# Patient Record
Sex: FEMALE | Race: WHITE | NOT HISPANIC OR LATINO | Employment: FULL TIME | URBAN - METROPOLITAN AREA
[De-identification: names, ages, dates, MRNs, and addresses within clinical notes are randomized per-mention and may not be internally consistent; named-entity substitution may affect disease eponyms.]

---

## 2017-07-11 ENCOUNTER — HOSPITAL ENCOUNTER (EMERGENCY)
Facility: HOSPITAL | Age: 27
Discharge: HOME | End: 2017-07-11
Attending: EMERGENCY MEDICINE | Admitting: EMERGENCY MEDICINE

## 2017-07-11 VITALS
DIASTOLIC BLOOD PRESSURE: 92 MMHG | HEIGHT: 69 IN | SYSTOLIC BLOOD PRESSURE: 128 MMHG | RESPIRATION RATE: 20 BRPM | TEMPERATURE: 99 F | OXYGEN SATURATION: 98 % | WEIGHT: 180 LBS | BODY MASS INDEX: 26.66 KG/M2 | HEART RATE: 73 BPM

## 2017-07-11 DIAGNOSIS — B34.9 ACUTE VIRAL SYNDROME: Primary | ICD-10-CM

## 2017-07-11 LAB
ARTERIAL PATENCY WRIST A: POSITIVE
B-HCG UR QL: NEGATIVE
BACTERIA UR QL AUTO: ABNORMAL /HPF
BASE EXCESS BLDA CALC-SCNC: -3.9 MMOL/L (ref -2.3–2.3)
BDY SITE: ABNORMAL
BILIRUB UR QL STRIP: ABNORMAL
CLARITY UR: CLEAR
COHGB MFR BLD: 1.92 % (ref 0–1.9)
COLOR UR: YELLOW
GLUCOSE UR STRIP-MCNC: NEGATIVE MG/DL
HCO3 BLDA-SCNC: 18.4 MMOL/L (ref 22–26)
HGB BLDA-MCNC: 7.5 G/DL (ref 12–18)
HGB UR QL STRIP.AUTO: ABNORMAL
HYALINE CASTS UR QL AUTO: ABNORMAL /LPF
KETONES UR QL STRIP: ABNORMAL
LEUKOCYTE ESTERASE UR QL STRIP.AUTO: NEGATIVE
METHGB BLD QL: 0.6 % (ref 0–0.4)
MODALITY: ABNORMAL
MUCOUS THREADS URNS QL MICRO: ABNORMAL /HPF
NITRITE UR QL STRIP: NEGATIVE
OXYHGB MFR BLDV: 83.3 % (ref 79–100)
PCO2 BLDA: 23.4 MM HG (ref 35–45)
PH BLDA: 7.41 PH UNITS (ref 7.35–7.45)
PH UR STRIP.AUTO: 6 [PH] (ref 4.5–8)
PO2 BLDA: 83.6 MM HG (ref 80–100)
PROT UR QL STRIP: ABNORMAL
RBC # UR: ABNORMAL /HPF
REF LAB TEST METHOD: ABNORMAL
SAO2 % BLDCOA: 98.2 % (ref 91–100)
SP GR UR STRIP: 1.02 (ref 1–1.03)
SQUAMOUS #/AREA URNS HPF: ABNORMAL /HPF
UROBILINOGEN UR QL STRIP: ABNORMAL
WBC UR QL AUTO: ABNORMAL /HPF

## 2017-07-11 PROCEDURE — 82375 ASSAY CARBOXYHB QUANT: CPT | Performed by: EMERGENCY MEDICINE

## 2017-07-11 PROCEDURE — 87086 URINE CULTURE/COLONY COUNT: CPT | Performed by: EMERGENCY MEDICINE

## 2017-07-11 PROCEDURE — 83050 HGB METHEMOGLOBIN QUAN: CPT | Performed by: EMERGENCY MEDICINE

## 2017-07-11 PROCEDURE — 81001 URINALYSIS AUTO W/SCOPE: CPT | Performed by: EMERGENCY MEDICINE

## 2017-07-11 PROCEDURE — 83060 HGB SULFHEMOGLOBIN QUAN: CPT | Performed by: EMERGENCY MEDICINE

## 2017-07-11 PROCEDURE — 99283 EMERGENCY DEPT VISIT LOW MDM: CPT

## 2017-07-11 PROCEDURE — 82376 ASSAY CARBOXYHB QUAL: CPT | Performed by: EMERGENCY MEDICINE

## 2017-07-11 PROCEDURE — 81025 URINE PREGNANCY TEST: CPT | Performed by: EMERGENCY MEDICINE

## 2017-07-11 PROCEDURE — 36600 WITHDRAWAL OF ARTERIAL BLOOD: CPT | Performed by: EMERGENCY MEDICINE

## 2017-07-11 PROCEDURE — 82805 BLOOD GASES W/O2 SATURATION: CPT | Performed by: EMERGENCY MEDICINE

## 2017-07-11 PROCEDURE — 99284 EMERGENCY DEPT VISIT MOD MDM: CPT | Performed by: EMERGENCY MEDICINE

## 2017-07-11 RX ORDER — ONDANSETRON 4 MG/1
4 TABLET, FILM COATED ORAL EVERY 6 HOURS PRN
Qty: 30 TABLET | Refills: 0 | Status: SHIPPED | OUTPATIENT
Start: 2017-07-11 | End: 2019-03-30

## 2017-07-11 RX ORDER — TOPIRAMATE 100 MG/1
200 CAPSULE, EXTENDED RELEASE ORAL
COMMUNITY
End: 2019-03-30

## 2017-07-11 RX ORDER — ONDANSETRON 4 MG/1
4 TABLET, ORALLY DISINTEGRATING ORAL ONCE
Status: COMPLETED | OUTPATIENT
Start: 2017-07-11 | End: 2017-07-11

## 2017-07-11 RX ORDER — IBUPROFEN 800 MG/1
TABLET ORAL
Qty: 30 TABLET | Refills: 0 | Status: SHIPPED | OUTPATIENT
Start: 2017-07-11 | End: 2019-03-30

## 2017-07-11 RX ORDER — IBUPROFEN 400 MG/1
800 TABLET ORAL ONCE
Status: COMPLETED | OUTPATIENT
Start: 2017-07-11 | End: 2017-07-11

## 2017-07-11 RX ADMIN — ONDANSETRON 4 MG: 4 TABLET, ORALLY DISINTEGRATING ORAL at 21:10

## 2017-07-11 RX ADMIN — IBUPROFEN 800 MG: 400 TABLET ORAL at 21:40

## 2017-07-11 NOTE — ED TRIAGE NOTES
"Body aches, dizzy, extremities going numb, \" spitting up blood\", headache, started last night  "

## 2017-07-12 NOTE — ED PROVIDER NOTES
Subjective   History of Present Illness  History of Present Illness    Chief complaint: Flulike symptoms    Location: Diffuse    Quality/Severity:  Moderate    Timing/Duration: 2-3 days    Modifying Factors: Transiently improved with Phenergan    Associated Symptoms: Nausea without vomiting.  Headache without neck pain.  No abdominal pain, chest pain, shortness of breath or cough.  Chills without reported fever.    Narrative: 27-year-old female presents with her  with similar symptoms.  Both had flulike symptoms for 2-3 days.  No foreign travel.  No known carbon monoxide exposure.    Review of Systems  All other systems reviewed and are otherwise negative.    Past Medical History:   Diagnosis Date   • Asthma    • Headache    • Hypertension    • PCOS (polycystic ovarian syndrome) 07/2013       No Known Allergies    Past Surgical History:   Procedure Laterality Date   • ADENOIDECTOMY     • TONSILLECTOMY  02/2008    adnoids also       Family History   Problem Relation Age of Onset   • Hypertension Mother    • Hypertension Maternal Grandmother    • Diabetes Maternal Grandfather    • Hypertension Paternal Grandmother    • Diabetes Paternal Grandfather        Social History     Social History   • Marital status:      Spouse name: N/A   • Number of children: N/A   • Years of education: N/A     Social History Main Topics   • Smoking status: Current Every Day Smoker     Packs/day: 0.25   • Smokeless tobacco: Never Used   • Alcohol use Yes   • Drug use: No   • Sexual activity: Yes     Partners: Male     Other Topics Concern   • None     Social History Narrative     ED Triage Vitals   Temp Heart Rate Resp BP SpO2   07/11/17 1827 07/11/17 1827 07/11/17 1827 07/11/17 1827 07/11/17 1827   98.4 °F (36.9 °C) 95 15 122/79 98 %      Temp src Heart Rate Source Patient Position BP Location FiO2 (%)   07/11/17 1827 07/11/17 1827 07/11/17 2041 07/11/17 2041 --   Oral Monitor Sitting Right arm            Objective   Physical  Exam   Constitutional: She is oriented to person, place, and time. She appears well-developed. No distress.   Pleasant, appears as though she does not feel well though she is not overtly toxic.   HENT:   Head: Normocephalic.   Mouth/Throat: Oropharynx is clear and moist.   TMs clear bilaterally   Eyes: Conjunctivae are normal. No scleral icterus.   Neck: Neck supple.   Painless movement; no meningismus or rigidity   Cardiovascular: Normal rate and regular rhythm.    Pulmonary/Chest: Effort normal and breath sounds normal. No respiratory distress.   Abdominal: Soft. There is no tenderness.   Musculoskeletal:   MAEE, normal strength   Neurological: She is alert and oriented to person, place, and time.   Skin: Skin is warm and dry.   Psychiatric: She has a normal mood and affect. Thought content normal.   Nursing note and vitals reviewed.      Procedures         ED Course  ED Course   Value Comment By Time   Bacteria, UA: (!) 1+ Likely poor quality clean catch given the number of squamous epithelial cells.  No white cells, negative leukocyte esterase and negative nitrites.  Await carbon monoxide level Julio C Sidhu MD 07/11 2148   Carboxyhemoglobin: (!) 1.92 The reference range of COHb differs among smokers and nonsmokers, as follows:      • Nonsmokers: Up to 3%   • Smokers: Up to 10%-15% Julio C Sidhu MD 07/11 2223    Rest and oral fluids encouraged. Julio C Sidhu MD 07/11 2236      Results for orders placed or performed during the hospital encounter of 07/11/17   Pregnancy, Urine   Result Value Ref Range    HCG, Urine QL Negative Negative   Urinalysis With / Culture If Indicated   Result Value Ref Range    Color, UA Yellow Yellow, Straw    Appearance, UA Clear Clear    pH, UA 6.0 4.5 - 8.0    Specific Gravity, UA 1.020 1.003 - 1.030    Glucose, UA Negative Negative    Ketones, UA 40 mg/dL (2+) (A) Negative, 80 mg/dL (3+), >=160 mg/dL (4+)    Bilirubin, UA Small (1+) (A) Negative    Blood, UA Trace (A) Negative     Protein, UA Trace (A) Negative    Leuk Esterase, UA Negative Negative    Nitrite, UA Negative Negative    Urobilinogen, UA 1.0 E.U./dL 0.2 - 1.0 E.U./dL   Urinalysis, Microscopic Only   Result Value Ref Range    RBC, UA 3-5 (A) None Seen /HPF    WBC, UA None Seen None Seen /HPF    Bacteria, UA 1+ (A) None Seen /HPF    Squamous Epithelial Cells, UA 7-12 (A) None Seen, 0-2 /HPF    Hyaline Casts, UA None Seen None Seen /LPF    Mucus, UA Small/1+ (A) None Seen, Trace /HPF    Methodology Manual Light Microscopy    Blood Gas, Arterial With Co-Ox   Result Value Ref Range    Site Arterial: left radial     Bryan's Test Positive     pH, Arterial 7.413 7.350 - 7.450 pH units    pCO2, Arterial 23.4 (L) 35.0 - 45.0 mm Hg    pO2, Arterial 83.6 80.0 - 100.0 mm Hg    HCO3, Arterial 18.4 (L) 22.0 - 26.0 mmol/L    Base Excess, Arterial -3.9 (L) -2.3 - 2.3 mmol/L    O2 Saturation Calculated 98.2 91.0 - 100.0 %    Hemoglobin, Blood Gas 7.5 (L) 12 - 18 g/dL    Oxyhemoglobin 83.3 79 - 100 %    Methemoglobin 0.6 (H) 0 - 0.4 %    Carboxyhemoglobin 1.92 (H) 0 - 1.9 %    Modality RA                  MDM    Final diagnoses:   Acute viral syndrome              Medication List      New Prescriptions          ibuprofen 800 MG tablet   Commonly known as:  ADVIL,MOTRIN   Take one tablet by mouth every 8 hours as needed for pain       ondansetron 4 MG tablet   Commonly known as:  ZOFRAN   Take 1 tablet by mouth Every 6 (Six) Hours As Needed for Nausea or   Vomiting.         Stop          cephalexin 500 MG capsule   Commonly known as:  KEFLEX           Follow-up Information     Schedule an appointment as soon as possible for a visit with [facility].    Why:  As needed    Contact information:    [facility]  [place] [address] [phone]               Julio C Sidhu MD  07/11/17 8493

## 2017-07-12 NOTE — ED NOTES
Pt sts it started with headache then went to body aches, nausea, blood in spit (not vomit), feel like she could pass out while sitting, hands and feet goes numb.       Concepción Padron RN  07/11/17 2052

## 2017-07-13 LAB — BACTERIA SPEC AEROBE CULT: NORMAL

## 2017-07-17 LAB
COHGB MFR BLD: NORMAL %SATURATION
METHGB MFR BLD: NORMAL %SATURATION
SULFHEMOGLOBIN, B: NORMAL %SATURATION

## 2017-12-06 ENCOUNTER — HOSPITAL ENCOUNTER (EMERGENCY)
Facility: HOSPITAL | Age: 27
Discharge: HOME | End: 2017-12-06
Attending: EMERGENCY MEDICINE | Admitting: EMERGENCY MEDICINE

## 2017-12-06 ENCOUNTER — APPOINTMENT (OUTPATIENT)
Dept: GENERAL RADIOLOGY | Facility: HOSPITAL | Age: 27
End: 2017-12-06

## 2017-12-06 VITALS
HEART RATE: 87 BPM | WEIGHT: 175 LBS | RESPIRATION RATE: 20 BRPM | TEMPERATURE: 99.7 F | SYSTOLIC BLOOD PRESSURE: 134 MMHG | HEIGHT: 68 IN | OXYGEN SATURATION: 98 % | DIASTOLIC BLOOD PRESSURE: 89 MMHG | BODY MASS INDEX: 26.52 KG/M2

## 2017-12-06 DIAGNOSIS — J18.9 PNEUMONIA DUE TO INFECTIOUS ORGANISM, UNSPECIFIED LATERALITY, UNSPECIFIED PART OF LUNG: ICD-10-CM

## 2017-12-06 DIAGNOSIS — J20.9 ACUTE BRONCHITIS WITH BRONCHOSPASM: Primary | ICD-10-CM

## 2017-12-06 LAB — B-HCG UR QL: NEGATIVE

## 2017-12-06 PROCEDURE — 99284 EMERGENCY DEPT VISIT MOD MDM: CPT

## 2017-12-06 PROCEDURE — 71020 HC CHEST PA AND LATERAL: CPT

## 2017-12-06 PROCEDURE — 94640 AIRWAY INHALATION TREATMENT: CPT

## 2017-12-06 PROCEDURE — 81025 URINE PREGNANCY TEST: CPT | Performed by: PHYSICIAN ASSISTANT

## 2017-12-06 PROCEDURE — 99284 EMERGENCY DEPT VISIT MOD MDM: CPT | Performed by: EMERGENCY MEDICINE

## 2017-12-06 RX ORDER — AZITHROMYCIN 250 MG/1
500 TABLET, FILM COATED ORAL ONCE
Status: COMPLETED | OUTPATIENT
Start: 2017-12-06 | End: 2017-12-06

## 2017-12-06 RX ORDER — ALBUTEROL SULFATE 2.5 MG/3ML
2.5 SOLUTION RESPIRATORY (INHALATION) ONCE
Status: COMPLETED | OUTPATIENT
Start: 2017-12-06 | End: 2017-12-06

## 2017-12-06 RX ORDER — AZITHROMYCIN 250 MG/1
TABLET, FILM COATED ORAL
Qty: 4 TABLET | Refills: 0 | Status: SHIPPED | OUTPATIENT
Start: 2017-12-06 | End: 2019-03-30

## 2017-12-06 RX ADMIN — AZITHROMYCIN 500 MG: 250 TABLET, FILM COATED ORAL at 23:08

## 2017-12-06 RX ADMIN — ALBUTEROL SULFATE 2.5 MG: 2.5 SOLUTION RESPIRATORY (INHALATION) at 21:49

## 2017-12-06 RX ADMIN — HYDROCODONE BITARTRATE AND HOMATROPINE METHYLBROMIDE 5 ML: 5; 1.5 SOLUTION ORAL at 21:43

## 2017-12-07 ENCOUNTER — HOSPITAL ENCOUNTER (OUTPATIENT)
Dept: GENERAL RADIOLOGY | Facility: HOSPITAL | Age: 27
Discharge: HOME | End: 2017-12-06

## 2017-12-07 DIAGNOSIS — R05.9 COUGH: ICD-10-CM

## 2017-12-07 NOTE — ED NOTES
Records requested from [facility]     Heladio Wiley  12/06/17 8543

## 2017-12-07 NOTE — DISCHARGE INSTRUCTIONS
Continue your steroids that were recently prescribed to use until you have completed them.  Use the Hycodan as needed for acute bronchospasm.  Follow-up as an outpatient with her primary care provider.  Excellent work thus far and good luck in the future to larger goal of smoking cessation.    Return to the emergency department with worsening symptoms, uncontrolled pain, inability to tolerate oral liquids, fever greater than 105°F not controlled by Tylenol and ibuprofen or as needed with emergent concerns.

## 2017-12-07 NOTE — ED PROVIDER NOTES
Subjective   History of Present Illness     History of Present Illness    Chief complaint: Cough and sensation of inability to catch her breath due to cough    Location: Mild peristernal discomfort    Quality/Severity:  Moderate    Timing/Duration: URI symptoms with cough ×4 days, persistent coughing for 30 minutes    Modifying Factors: Transient relief with albuterol inhaler    Associated Symptoms: No fever or hemoptysis    Narrative: 27-year-old female with history of asthma who stopped smoking 4 days ago but is also had URI symptoms for that period of time and was seen previously at another emergency department and started on steroids presents to this emergency department tonight with an episode of coughing paroxysms that she is having difficulty controlling.    Review of Systems  All other systems reviewed and are otherwise negative.    Past Medical History:   Diagnosis Date   • Asthma    • Headache    • Hypertension    • PCOS (polycystic ovarian syndrome) 07/2013       No Known Allergies    Past Surgical History:   Procedure Laterality Date   • ADENOIDECTOMY     • TONSILLECTOMY  02/2008    adnoids also       Family History   Problem Relation Age of Onset   • Hypertension Mother    • Hypertension Maternal Grandmother    • Diabetes Maternal Grandfather    • Hypertension Paternal Grandmother    • Diabetes Paternal Grandfather        Social History     Social History   • Marital status:      Spouse name: N/A   • Number of children: N/A   • Years of education: N/A     Social History Main Topics   • Smoking status: Current Every Day Smoker     Packs/day: 0.25   • Smokeless tobacco: Never Used   • Alcohol use Yes   • Drug use: No   • Sexual activity: Yes     Partners: Male     Other Topics Concern   • None     Social History Narrative     ED Triage Vitals   Temp Heart Rate Resp BP SpO2   12/06/17 2121 12/06/17 2121 12/06/17 2121 12/06/17 2121 12/06/17 2121   98 °F (36.7 °C) 99 20 166/94 99 %      Temp src Heart  Rate Source Patient Position BP Location FiO2 (%)   12/06/17 2121 12/06/17 2121 12/06/17 2121 12/06/17 2121 --   Oral Monitor Sitting Right arm            Objective   Physical Exam   Constitutional: She is oriented to person, place, and time. She appears well-developed. No distress.   Frequent dry hacking cough   HENT:   Head: Normocephalic.   Mouth/Throat: Oropharynx is clear and moist.   Eyes: Conjunctivae are normal. No scleral icterus.   Neck: Neck supple.   Painless movement   Cardiovascular: Normal rate and regular rhythm.    Pulmonary/Chest: Effort normal and breath sounds normal. No respiratory distress.   Abdominal: Soft. There is no tenderness.   Musculoskeletal: She exhibits no edema or tenderness.   MAEE, normal strength   Neurological: She is alert and oriented to person, place, and time.   Skin: Skin is warm and dry.   Psychiatric: She has a normal mood and affect. Thought content normal.   Nursing note and vitals reviewed.      Procedures         ED Course  ED Course   Comment By Time   Albuterol treatment and Hycodan for bronchospasm. Julio C Sidhu MD 12/06 2145   Continue to await for UPT before Julio C Sidhu MD 12/06 2235      RADIOLOGY        Study: Chest x-ray-2 views    Findings: Hazy area on lateral view concerning for early infiltrate versus mass    Interpreted contemporaneously with treatment by me, independently viewed by me    Results for orders placed or performed during the hospital encounter of 12/06/17   Pregnancy, Urine - Urine, Clean Catch   Result Value Ref Range    HCG, Urine QL Negative Negative             MDM  Number of Diagnoses or Management Options  Acute bronchitis with bronchospasm: new and requires workup  Pneumonia due to infectious organism, unspecified laterality, unspecified part of lung:      Amount and/or Complexity of Data Reviewed  Clinical lab tests: reviewed and ordered  Tests in the radiology section of CPT®: ordered and reviewed  Independent visualization of  images, tracings, or specimens: yes    Gennaro query complete. Treatment plan to include limited course of prescribed controlled substance.  Risks including addiction, tolerance, sedation, benefits and alternatives presented to patient.    Final diagnoses:   Acute bronchitis with bronchospasm   Pneumonia due to infectious organism, unspecified laterality, unspecified part of lung              Medication List      New Prescriptions          azithromycin 250 MG tablet   Commonly known as:  ZITHROMAX   Take 1 tablet by mouth daily for 4 days.       HYDROcodone-homatropine 5-1.5 MG/5ML syrup   Commonly known as:  HYCODAN   Take 510 mL by mouth every 6 hours when necessary cough         Stop          cephalexin 500 MG capsule   Commonly known as:  KEFLEX           Follow-up Information     Follow up with Your primary care provider. Schedule an appointment as soon as possible for a visit in 1 week.    Why:  Reassessment               Julio C Sidhu MD  12/06/17 3279

## 2018-07-29 ENCOUNTER — APPOINTMENT (OUTPATIENT)
Dept: GENERAL RADIOLOGY | Facility: HOSPITAL | Age: 28
End: 2018-07-29

## 2018-07-29 ENCOUNTER — HOSPITAL ENCOUNTER (EMERGENCY)
Facility: HOSPITAL | Age: 28
Discharge: HOME | End: 2018-07-29
Attending: EMERGENCY MEDICINE | Admitting: EMERGENCY MEDICINE

## 2018-07-29 VITALS
WEIGHT: 182 LBS | SYSTOLIC BLOOD PRESSURE: 179 MMHG | DIASTOLIC BLOOD PRESSURE: 116 MMHG | OXYGEN SATURATION: 99 % | HEART RATE: 69 BPM | RESPIRATION RATE: 18 BRPM | TEMPERATURE: 98.5 F | HEIGHT: 69 IN | BODY MASS INDEX: 26.96 KG/M2
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
## 2018-07-29 DIAGNOSIS — S90.211A SUBUNGUAL HEMATOMA OF GREAT TOE OF RIGHT FOOT, INITIAL ENCOUNTER: ICD-10-CM

## 2018-07-29 DIAGNOSIS — R03.0 ELEVATED BLOOD PRESSURE READING: ICD-10-CM

## 2018-07-29 DIAGNOSIS — S92.254A CLOSED NONDISPLACED FRACTURE OF NAVICULAR BONE OF RIGHT FOOT, INITIAL ENCOUNTER: Primary | ICD-10-CM

## 2018-07-29 DIAGNOSIS — T07.XXXA MULTIPLE ABRASIONS: ICD-10-CM

## 2018-07-29 PROCEDURE — 73630 X-RAY EXAM OF FOOT: CPT

## 2018-07-29 PROCEDURE — 99283 EMERGENCY DEPT VISIT LOW MDM: CPT

## 2018-07-29 PROCEDURE — 64450 NJX AA&/STRD OTHER PN/BRANCH: CPT | Performed by: EMERGENCY MEDICINE

## 2018-07-29 PROCEDURE — 99284 EMERGENCY DEPT VISIT MOD MDM: CPT | Performed by: EMERGENCY MEDICINE

## 2018-07-29 RX ORDER — BUPIVACAINE HYDROCHLORIDE 5 MG/ML
INJECTION, SOLUTION EPIDURAL; INTRACAUDAL
Status: COMPLETED
Start: 2018-07-29 | End: 2018-07-29

## 2018-07-29 RX ORDER — LIDOCAINE HYDROCHLORIDE 20 MG/ML
INJECTION, SOLUTION INFILTRATION; PERINEURAL
Status: COMPLETED
Start: 2018-07-29 | End: 2018-07-29

## 2018-07-29 RX ORDER — CEPHALEXIN 500 MG/1
500 CAPSULE ORAL 3 TIMES DAILY
Qty: 15 CAPSULE | Refills: 0 | Status: SHIPPED | OUTPATIENT
Start: 2018-07-29 | End: 2019-03-30

## 2018-07-29 RX ORDER — HYDROCODONE BITARTRATE AND ACETAMINOPHEN 5; 325 MG/1; MG/1
TABLET ORAL
Qty: 20 TABLET | Refills: 0 | Status: SHIPPED | OUTPATIENT
Start: 2018-07-29 | End: 2019-03-30

## 2018-07-29 RX ORDER — CEPHALEXIN 500 MG/1
500 CAPSULE ORAL ONCE
Status: COMPLETED | OUTPATIENT
Start: 2018-07-29 | End: 2018-07-29

## 2018-07-29 RX ADMIN — LIDOCAINE HYDROCHLORIDE: 20 INJECTION, SOLUTION INFILTRATION; PERINEURAL at 22:31

## 2018-07-29 RX ADMIN — BUPIVACAINE HYDROCHLORIDE: 5 INJECTION, SOLUTION EPIDURAL; INTRACAUDAL; PERINEURAL at 22:32

## 2018-07-29 RX ADMIN — CEPHALEXIN 500 MG: 500 CAPSULE ORAL at 22:35

## 2019-03-30 ENCOUNTER — APPOINTMENT (OUTPATIENT)
Dept: ULTRASOUND IMAGING | Facility: HOSPITAL | Age: 29
End: 2019-03-30

## 2019-03-30 ENCOUNTER — HOSPITAL ENCOUNTER (EMERGENCY)
Facility: HOSPITAL | Age: 29
Discharge: HOME | End: 2019-03-30
Attending: EMERGENCY MEDICINE | Admitting: EMERGENCY MEDICINE

## 2019-03-30 VITALS
BODY MASS INDEX: 25.77 KG/M2 | WEIGHT: 174 LBS | TEMPERATURE: 98.2 F | SYSTOLIC BLOOD PRESSURE: 132 MMHG | RESPIRATION RATE: 18 BRPM | HEART RATE: 65 BPM | DIASTOLIC BLOOD PRESSURE: 90 MMHG | OXYGEN SATURATION: 97 % | HEIGHT: 69 IN

## 2019-03-30 DIAGNOSIS — N92.1 MENOMETRORRHAGIA: Primary | ICD-10-CM

## 2019-03-30 LAB
B-HCG UR QL: NEGATIVE
CLUE CELLS SPEC QL WET PREP: NORMAL
HYDATID CYST SPEC WET PREP: NORMAL
T VAGINALIS SPEC QL WET PREP: NORMAL
WBC SPEC QL WET PREP: NORMAL
YEAST GENITAL QL WET PREP: NORMAL

## 2019-03-30 PROCEDURE — 99284 EMERGENCY DEPT VISIT MOD MDM: CPT | Performed by: PHYSICIAN ASSISTANT

## 2019-03-30 PROCEDURE — 87491 CHLMYD TRACH DNA AMP PROBE: CPT | Performed by: PHYSICIAN ASSISTANT

## 2019-03-30 PROCEDURE — 76830 TRANSVAGINAL US NON-OB: CPT

## 2019-03-30 PROCEDURE — 87210 SMEAR WET MOUNT SALINE/INK: CPT | Performed by: PHYSICIAN ASSISTANT

## 2019-03-30 PROCEDURE — 87081 CULTURE SCREEN ONLY: CPT | Performed by: PHYSICIAN ASSISTANT

## 2019-03-30 PROCEDURE — 76856 US EXAM PELVIC COMPLETE: CPT

## 2019-03-30 PROCEDURE — 99284 EMERGENCY DEPT VISIT MOD MDM: CPT

## 2019-03-30 PROCEDURE — 81025 URINE PREGNANCY TEST: CPT | Performed by: PHYSICIAN ASSISTANT

## 2019-03-30 PROCEDURE — 87591 N.GONORRHOEAE DNA AMP PROB: CPT | Performed by: PHYSICIAN ASSISTANT

## 2019-03-30 RX ORDER — IBUPROFEN 800 MG/1
800 TABLET ORAL EVERY 8 HOURS PRN
Qty: 20 TABLET | Refills: 0 | Status: SHIPPED | OUTPATIENT
Start: 2019-03-30 | End: 2019-09-16

## 2019-03-30 RX ORDER — IBUPROFEN 400 MG/1
800 TABLET ORAL ONCE
Status: COMPLETED | OUTPATIENT
Start: 2019-03-30 | End: 2019-03-30

## 2019-03-30 RX ORDER — METOPROLOL TARTRATE 50 MG/1
50 TABLET, FILM COATED ORAL 2 TIMES DAILY
COMMUNITY
End: 2019-09-16

## 2019-03-30 RX ADMIN — IBUPROFEN 800 MG: 400 TABLET ORAL at 15:46

## 2019-04-02 LAB
C TRACH RRNA SPEC DONR QL NAA+PROBE: NEGATIVE
N GONORRHOEA DNA SPEC QL NAA+PROBE: NEGATIVE

## 2019-04-03 LAB — BACTERIA SPEC AEROBE CULT: NORMAL

## 2019-05-17 ENCOUNTER — HOSPITAL ENCOUNTER (EMERGENCY)
Facility: HOSPITAL | Age: 29
Discharge: HOME | End: 2019-05-18
Attending: EMERGENCY MEDICINE | Admitting: EMERGENCY MEDICINE

## 2019-05-17 DIAGNOSIS — R53.83 OTHER FATIGUE: ICD-10-CM

## 2019-05-17 DIAGNOSIS — R55 SYNCOPE AND COLLAPSE: Primary | ICD-10-CM

## 2019-05-17 PROCEDURE — 99284 EMERGENCY DEPT VISIT MOD MDM: CPT | Performed by: EMERGENCY MEDICINE

## 2019-05-17 PROCEDURE — 99284 EMERGENCY DEPT VISIT MOD MDM: CPT

## 2019-05-17 PROCEDURE — 93005 ELECTROCARDIOGRAM TRACING: CPT | Performed by: EMERGENCY MEDICINE

## 2019-05-17 RX ORDER — SODIUM CHLORIDE 0.9 % (FLUSH) 0.9 %
10 SYRINGE (ML) INJECTION AS NEEDED
Status: DISCONTINUED | OUTPATIENT
Start: 2019-05-17 | End: 2019-05-18 | Stop reason: HOSPADM

## 2019-05-18 VITALS
HEIGHT: 69 IN | TEMPERATURE: 98.1 F | HEART RATE: 87 BPM | SYSTOLIC BLOOD PRESSURE: 141 MMHG | BODY MASS INDEX: 27.4 KG/M2 | WEIGHT: 185 LBS | OXYGEN SATURATION: 95 % | RESPIRATION RATE: 16 BRPM | DIASTOLIC BLOOD PRESSURE: 93 MMHG

## 2019-05-18 LAB
ALBUMIN SERPL-MCNC: 4.3 G/DL (ref 3.5–5.2)
ALBUMIN/GLOB SERPL: 1.7 G/DL
ALP SERPL-CCNC: 77 U/L (ref 39–117)
ALT SERPL W P-5'-P-CCNC: 17 U/L (ref 1–33)
AMPHET+METHAMPHET UR QL: NEGATIVE
AMPHETAMINES UR QL: NEGATIVE
ANION GAP SERPL CALCULATED.3IONS-SCNC: 12.2 MMOL/L
APTT PPP: 30.2 SECONDS (ref 24.3–38.1)
AST SERPL-CCNC: 16 U/L (ref 1–32)
BACTERIA UR QL AUTO: ABNORMAL /HPF
BARBITURATES UR QL SCN: NEGATIVE
BASOPHILS # BLD AUTO: 0.06 10*3/MM3 (ref 0–0.2)
BASOPHILS NFR BLD AUTO: 0.6 % (ref 0–1.5)
BENZODIAZ UR QL SCN: NEGATIVE
BILIRUB SERPL-MCNC: <0.2 MG/DL (ref 0.2–1.2)
BILIRUB UR QL STRIP: NEGATIVE
BUN BLD-MCNC: 15 MG/DL (ref 6–20)
BUN/CREAT SERPL: 16.1 (ref 7–25)
BUPRENORPHINE SERPL-MCNC: NEGATIVE NG/ML
CALCIUM SPEC-SCNC: 9.8 MG/DL (ref 8.6–10.5)
CANNABINOIDS SERPL QL: NEGATIVE
CHLORIDE SERPL-SCNC: 103 MMOL/L (ref 98–107)
CLARITY UR: CLEAR
CO2 SERPL-SCNC: 22.8 MMOL/L (ref 22–29)
COCAINE UR QL: NEGATIVE
COLOR UR: YELLOW
CREAT BLD-MCNC: 0.93 MG/DL (ref 0.57–1)
DEPRECATED RDW RBC AUTO: 46.5 FL (ref 37–54)
EOSINOPHIL # BLD AUTO: 0.15 10*3/MM3 (ref 0–0.4)
EOSINOPHIL NFR BLD AUTO: 1.5 % (ref 0.3–6.2)
ERYTHROCYTE [DISTWIDTH] IN BLOOD BY AUTOMATED COUNT: 14.4 % (ref 12.3–15.4)
GFR SERPL CREATININE-BSD FRML MDRD: 71 ML/MIN/1.73
GLOBULIN UR ELPH-MCNC: 2.6 GM/DL
GLUCOSE BLD-MCNC: 112 MG/DL (ref 65–99)
GLUCOSE BLDC GLUCOMTR-MCNC: 109 MG/DL (ref 70–130)
GLUCOSE UR STRIP-MCNC: NEGATIVE MG/DL
HCG SERPL QL: NEGATIVE
HCT VFR BLD AUTO: 40.9 % (ref 34–46.6)
HGB BLD-MCNC: 13.4 G/DL (ref 12–15.9)
HGB UR QL STRIP.AUTO: ABNORMAL
HYALINE CASTS UR QL AUTO: ABNORMAL /LPF
IMM GRANULOCYTES # BLD AUTO: 0.04 10*3/MM3 (ref 0–0.05)
IMM GRANULOCYTES NFR BLD AUTO: 0.4 % (ref 0–0.5)
INR PPP: 1.06 (ref 0.9–1.1)
KETONES UR QL STRIP: NEGATIVE
LEUKOCYTE ESTERASE UR QL STRIP.AUTO: NEGATIVE
LYMPHOCYTES # BLD AUTO: 3.89 10*3/MM3 (ref 0.7–3.1)
LYMPHOCYTES NFR BLD AUTO: 38 % (ref 19.6–45.3)
MCH RBC QN AUTO: 29.1 PG (ref 26.6–33)
MCHC RBC AUTO-ENTMCNC: 32.8 G/DL (ref 31.5–35.7)
MCV RBC AUTO: 88.7 FL (ref 79–97)
METHADONE UR QL SCN: NEGATIVE
MONOCYTES # BLD AUTO: 0.89 10*3/MM3 (ref 0.1–0.9)
MONOCYTES NFR BLD AUTO: 8.7 % (ref 5–12)
NEUTROPHILS # BLD AUTO: 5.21 10*3/MM3 (ref 1.7–7)
NEUTROPHILS NFR BLD AUTO: 50.8 % (ref 42.7–76)
NITRITE UR QL STRIP: NEGATIVE
NRBC BLD AUTO-RTO: 0 /100 WBC (ref 0–0.2)
OPIATES UR QL: NEGATIVE
OXYCODONE UR QL SCN: NEGATIVE
PCP UR QL SCN: NEGATIVE
PH UR STRIP.AUTO: 5.5 [PH] (ref 4.5–8)
PLATELET # BLD AUTO: 348 10*3/MM3 (ref 140–450)
PMV BLD AUTO: 10.4 FL (ref 6–12)
POTASSIUM BLD-SCNC: 3.6 MMOL/L (ref 3.5–5.2)
PROPOXYPH UR QL: NEGATIVE
PROT SERPL-MCNC: 6.9 G/DL (ref 6–8.5)
PROT UR QL STRIP: NEGATIVE
PROTHROMBIN TIME: 13.5 SECONDS (ref 12.1–15)
RBC # BLD AUTO: 4.61 10*6/MM3 (ref 3.77–5.28)
RBC # UR: ABNORMAL /HPF
REF LAB TEST METHOD: ABNORMAL
SODIUM BLD-SCNC: 138 MMOL/L (ref 136–145)
SP GR UR STRIP: 1.02 (ref 1–1.03)
SQUAMOUS #/AREA URNS HPF: ABNORMAL /HPF
TRICYCLICS UR QL SCN: NEGATIVE
TROPONIN T SERPL-MCNC: <0.01 NG/ML (ref 0–0.03)
UROBILINOGEN UR QL STRIP: ABNORMAL
WBC NRBC COR # BLD: 10.24 10*3/MM3 (ref 3.4–10.8)
WBC UR QL AUTO: ABNORMAL /HPF

## 2019-05-18 PROCEDURE — 85730 THROMBOPLASTIN TIME PARTIAL: CPT | Performed by: EMERGENCY MEDICINE

## 2019-05-18 PROCEDURE — 81001 URINALYSIS AUTO W/SCOPE: CPT | Performed by: EMERGENCY MEDICINE

## 2019-05-18 PROCEDURE — 80306 DRUG TEST PRSMV INSTRMNT: CPT | Performed by: EMERGENCY MEDICINE

## 2019-05-18 PROCEDURE — 85610 PROTHROMBIN TIME: CPT | Performed by: EMERGENCY MEDICINE

## 2019-05-18 PROCEDURE — 80053 COMPREHEN METABOLIC PANEL: CPT | Performed by: EMERGENCY MEDICINE

## 2019-05-18 PROCEDURE — 85025 COMPLETE CBC W/AUTO DIFF WBC: CPT | Performed by: EMERGENCY MEDICINE

## 2019-05-18 PROCEDURE — 93010 ELECTROCARDIOGRAM REPORT: CPT | Performed by: INTERNAL MEDICINE

## 2019-05-18 PROCEDURE — 84484 ASSAY OF TROPONIN QUANT: CPT | Performed by: EMERGENCY MEDICINE

## 2019-05-18 PROCEDURE — 82962 GLUCOSE BLOOD TEST: CPT

## 2019-05-18 PROCEDURE — 84703 CHORIONIC GONADOTROPIN ASSAY: CPT | Performed by: EMERGENCY MEDICINE

## 2019-05-18 RX ADMIN — SODIUM CHLORIDE 500 ML: 9 INJECTION, SOLUTION INTRAVENOUS at 00:47

## 2019-09-16 ENCOUNTER — HOSPITAL ENCOUNTER (EMERGENCY)
Facility: HOSPITAL | Age: 29
Discharge: HOME | End: 2019-09-16
Attending: EMERGENCY MEDICINE | Admitting: EMERGENCY MEDICINE

## 2019-09-16 VITALS
TEMPERATURE: 100 F | HEART RATE: 100 BPM | WEIGHT: 185 LBS | HEIGHT: 69 IN | BODY MASS INDEX: 27.4 KG/M2 | DIASTOLIC BLOOD PRESSURE: 100 MMHG | RESPIRATION RATE: 18 BRPM | SYSTOLIC BLOOD PRESSURE: 150 MMHG | OXYGEN SATURATION: 99 %

## 2019-09-16 DIAGNOSIS — L03.90 CELLULITIS, UNSPECIFIED CELLULITIS SITE: Primary | ICD-10-CM

## 2019-09-16 PROCEDURE — 99282 EMERGENCY DEPT VISIT SF MDM: CPT | Performed by: EMERGENCY MEDICINE

## 2019-09-16 PROCEDURE — 99282 EMERGENCY DEPT VISIT SF MDM: CPT

## 2019-09-16 RX ORDER — CLINDAMYCIN HYDROCHLORIDE 150 MG/1
450 CAPSULE ORAL 3 TIMES DAILY
Qty: 63 CAPSULE | Refills: 0 | Status: SHIPPED | OUTPATIENT
Start: 2019-09-16 | End: 2019-09-23

## 2019-09-16 NOTE — ED PROVIDER NOTES
Subjective   29-year-old female who denies past medical history presents with pain, swelling, irritation primarily to left ankle.  Patient states she developed a blister on her posterior heel 4 days ago from shoes rubbing.  She attempted local wound care at home but over the past day or 2 she has developed worsening redness to the area.  Patient has minimal pain to remainder of leg but states she believes it is secondary to swelling.  Pain is primarily to posterior and lateral ankle.  No fevers or chills.  No nausea or vomiting.            Review of Systems   Constitutional: Negative.    Respiratory: Negative.    Cardiovascular: Negative.    Gastrointestinal: Negative.    Musculoskeletal:        Per HPI, otherwise negative   Skin:        Per HPI, otherwise negative   Neurological: Negative.        Past Medical History:   Diagnosis Date   • Asthma    • Headache    • Hypertension    • PCOS (polycystic ovarian syndrome) 07/2013       No Known Allergies    Past Surgical History:   Procedure Laterality Date   • ADENOIDECTOMY     • TONSILLECTOMY  02/2008    adnoids also       Family History   Problem Relation Age of Onset   • Hypertension Mother    • Hypertension Maternal Grandmother    • Diabetes Maternal Grandfather    • Hypertension Paternal Grandmother    • Diabetes Paternal Grandfather        Social History     Socioeconomic History   • Marital status:      Spouse name: Not on file   • Number of children: Not on file   • Years of education: Not on file   • Highest education level: Not on file   Tobacco Use   • Smoking status: Current Every Day Smoker     Packs/day: 0.25   • Smokeless tobacco: Never Used   Substance and Sexual Activity   • Alcohol use: Yes   • Drug use: No   • Sexual activity: Yes     Partners: Male           Objective   Physical Exam   Constitutional: She is oriented to person, place, and time. She appears well-developed and well-nourished. No distress.   HENT:   Head: Normocephalic.    Cardiovascular: Normal rate and regular rhythm.   Pulmonary/Chest: Effort normal and breath sounds normal.   Abdominal: Soft. She exhibits no distension. There is no tenderness.   Musculoskeletal: Normal range of motion. She exhibits no tenderness or deformity.   Neurological: She is alert and oriented to person, place, and time.   Skin: She is not diaphoretic.   Small deroofed blister over posterior foot and ankle overlying Achilles tendon.  Minimal surrounding erythema but also tracks to the lateral aspect of ankle.  Trace lower extremity edema to mid calf.  No calf tenderness.  Negative Homans sign.       Procedures           ED Course                  MDM  Number of Diagnoses or Management Options  Diagnosis management comments: Appearance consistent with cellulitis secondary to blister formation.  Will treat for simple cellulitis.  Patient does have some swelling to ankle, but no other findings suggestive of DVT.  Did discuss DVT risk factors with patient.  Discussed expected course and return precautions.      Final diagnoses:   Cellulitis, unspecified cellulitis site              Kentrell Dia MD  09/16/19 0693

## 2019-11-15 ENCOUNTER — APPOINTMENT (OUTPATIENT)
Dept: GENERAL RADIOLOGY | Facility: HOSPITAL | Age: 29
End: 2019-11-15

## 2019-11-15 ENCOUNTER — HOSPITAL ENCOUNTER (EMERGENCY)
Facility: HOSPITAL | Age: 29
Discharge: HOME | End: 2019-11-15
Attending: EMERGENCY MEDICINE | Admitting: EMERGENCY MEDICINE

## 2019-11-15 VITALS
HEART RATE: 88 BPM | TEMPERATURE: 98.9 F | OXYGEN SATURATION: 97 % | SYSTOLIC BLOOD PRESSURE: 134 MMHG | RESPIRATION RATE: 24 BRPM | WEIGHT: 183 LBS | DIASTOLIC BLOOD PRESSURE: 94 MMHG | BODY MASS INDEX: 27.11 KG/M2 | HEIGHT: 69 IN

## 2019-11-15 DIAGNOSIS — R55 NEAR SYNCOPE: Primary | ICD-10-CM

## 2019-11-15 LAB
ALBUMIN SERPL-MCNC: 4.3 G/DL (ref 3.5–5.2)
ALBUMIN/GLOB SERPL: 1.5 G/DL
ALP SERPL-CCNC: 80 U/L (ref 39–117)
ALT SERPL W P-5'-P-CCNC: 16 U/L (ref 1–33)
AMORPH URATE CRY URNS QL MICRO: ABNORMAL /HPF
ANION GAP SERPL CALCULATED.3IONS-SCNC: 14.3 MMOL/L (ref 5–15)
AST SERPL-CCNC: 17 U/L (ref 1–32)
B-HCG UR QL: NEGATIVE
BACTERIA UR QL AUTO: ABNORMAL /HPF
BASOPHILS # BLD AUTO: 0.1 10*3/MM3 (ref 0–0.2)
BASOPHILS NFR BLD AUTO: 0.8 % (ref 0–1.5)
BILIRUB SERPL-MCNC: <0.2 MG/DL (ref 0.2–1.2)
BILIRUB UR QL STRIP: NEGATIVE
BUN BLD-MCNC: 9 MG/DL (ref 6–20)
BUN/CREAT SERPL: 12.3 (ref 7–25)
CALCIUM SPEC-SCNC: 8.9 MG/DL (ref 8.6–10.5)
CHLORIDE SERPL-SCNC: 101 MMOL/L (ref 98–107)
CLARITY UR: CLEAR
CO2 SERPL-SCNC: 23.7 MMOL/L (ref 22–29)
COLOR UR: YELLOW
CREAT BLD-MCNC: 0.73 MG/DL (ref 0.57–1)
D DIMER PPP FEU-MCNC: <0.27 MCGFEU/ML (ref 0–0.46)
DEPRECATED RDW RBC AUTO: 45.9 FL (ref 37–54)
EOSINOPHIL # BLD AUTO: 0.25 10*3/MM3 (ref 0–0.4)
EOSINOPHIL NFR BLD AUTO: 2 % (ref 0.3–6.2)
ERYTHROCYTE [DISTWIDTH] IN BLOOD BY AUTOMATED COUNT: 14.1 % (ref 12.3–15.4)
GFR SERPL CREATININE-BSD FRML MDRD: 94 ML/MIN/1.73
GLOBULIN UR ELPH-MCNC: 2.8 GM/DL
GLUCOSE BLD-MCNC: 111 MG/DL (ref 65–99)
GLUCOSE UR STRIP-MCNC: NEGATIVE MG/DL
HCT VFR BLD AUTO: 41.5 % (ref 34–46.6)
HGB BLD-MCNC: 13.5 G/DL (ref 12–15.9)
HGB UR QL STRIP.AUTO: ABNORMAL
HYALINE CASTS UR QL AUTO: ABNORMAL /LPF
IMM GRANULOCYTES # BLD AUTO: 0.07 10*3/MM3 (ref 0–0.05)
IMM GRANULOCYTES NFR BLD AUTO: 0.6 % (ref 0–0.5)
KETONES UR QL STRIP: ABNORMAL
LEUKOCYTE ESTERASE UR QL STRIP.AUTO: NEGATIVE
LYMPHOCYTES # BLD AUTO: 4.3 10*3/MM3 (ref 0.7–3.1)
LYMPHOCYTES NFR BLD AUTO: 33.9 % (ref 19.6–45.3)
MCH RBC QN AUTO: 29 PG (ref 26.6–33)
MCHC RBC AUTO-ENTMCNC: 32.5 G/DL (ref 31.5–35.7)
MCV RBC AUTO: 89.1 FL (ref 79–97)
MONOCYTES # BLD AUTO: 1.06 10*3/MM3 (ref 0.1–0.9)
MONOCYTES NFR BLD AUTO: 8.3 % (ref 5–12)
MUCOUS THREADS URNS QL MICRO: ABNORMAL /HPF
NEUTROPHILS # BLD AUTO: 6.92 10*3/MM3 (ref 1.7–7)
NEUTROPHILS NFR BLD AUTO: 54.4 % (ref 42.7–76)
NITRITE UR QL STRIP: NEGATIVE
NRBC BLD AUTO-RTO: 0 /100 WBC (ref 0–0.2)
PH UR STRIP.AUTO: 5.5 [PH] (ref 4.5–8)
PLATELET # BLD AUTO: 383 10*3/MM3 (ref 140–450)
PMV BLD AUTO: 9.8 FL (ref 6–12)
POTASSIUM BLD-SCNC: 3.3 MMOL/L (ref 3.5–5.2)
PROT SERPL-MCNC: 7.1 G/DL (ref 6–8.5)
PROT UR QL STRIP: NEGATIVE
RBC # BLD AUTO: 4.66 10*6/MM3 (ref 3.77–5.28)
RBC # UR: ABNORMAL /HPF
REF LAB TEST METHOD: ABNORMAL
SODIUM BLD-SCNC: 139 MMOL/L (ref 136–145)
SP GR UR STRIP: 1.01 (ref 1–1.03)
SQUAMOUS #/AREA URNS HPF: ABNORMAL /HPF
TSH SERPL DL<=0.05 MIU/L-ACNC: 4.99 UIU/ML (ref 0.27–4.2)
UROBILINOGEN UR QL STRIP: ABNORMAL
WBC NRBC COR # BLD: 12.7 10*3/MM3 (ref 3.4–10.8)
WBC UR QL AUTO: ABNORMAL /HPF

## 2019-11-15 PROCEDURE — 81001 URINALYSIS AUTO W/SCOPE: CPT | Performed by: EMERGENCY MEDICINE

## 2019-11-15 PROCEDURE — 85025 COMPLETE CBC W/AUTO DIFF WBC: CPT | Performed by: EMERGENCY MEDICINE

## 2019-11-15 PROCEDURE — 93010 ELECTROCARDIOGRAM REPORT: CPT | Performed by: INTERNAL MEDICINE

## 2019-11-15 PROCEDURE — 93005 ELECTROCARDIOGRAM TRACING: CPT | Performed by: EMERGENCY MEDICINE

## 2019-11-15 PROCEDURE — 80053 COMPREHEN METABOLIC PANEL: CPT | Performed by: EMERGENCY MEDICINE

## 2019-11-15 PROCEDURE — 71046 X-RAY EXAM CHEST 2 VIEWS: CPT

## 2019-11-15 PROCEDURE — 99283 EMERGENCY DEPT VISIT LOW MDM: CPT

## 2019-11-15 PROCEDURE — 99284 EMERGENCY DEPT VISIT MOD MDM: CPT | Performed by: EMERGENCY MEDICINE

## 2019-11-15 PROCEDURE — 84443 ASSAY THYROID STIM HORMONE: CPT | Performed by: EMERGENCY MEDICINE

## 2019-11-15 PROCEDURE — 81025 URINE PREGNANCY TEST: CPT | Performed by: EMERGENCY MEDICINE

## 2019-11-15 PROCEDURE — 85379 FIBRIN DEGRADATION QUANT: CPT | Performed by: EMERGENCY MEDICINE

## 2019-11-15 NOTE — ED NOTES
Pt presents via POV from home with  with c/o dizziness and general malaise for several days. 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 Upon arrival to ED pt states she feels weak and thinks she will faint if staff transfers her to ED stretcher. Pt assisted from WC to bed by 2 ED RNs, 1 tech and her . Pt slowly lowered herself to the floor on her knees and slumped her head to the side. Pt states she passed out. Pt head noded to side for approx 20 seconds. Pt A&Ox4 immediately upon awakening. Pt assisted fully into ED stretcher and placed on telemetry. Pt has no neurological deficits, speech is clear. Denies any spinning or the room spinning, says she just feels light headed. Pt also reports a similar episode last month where she was told she was dehydrated. Pt comfortably laying on ED stretcher, c/o slight HA. Otherwise pt is in NAD. Family at bedside.      Sandi Branch, RN  11/15/19 0027

## 2019-11-15 NOTE — ED PROVIDER NOTES
Subjective   29-year-old female with history of PCOS and asthma presents after near syncopal episode.  Patient reports she had episode where she became lightheaded, felt fatigued, weak, had palpitations, and rapid breathing.  Patient still complaining of some lightheadedness upon standing but symptoms have otherwise resolved.  Patient denies chest pain at any point.  States she has had some mild cough and congestion over the past several days but is otherwise felt well.  Also has had mild headache.  No weakness, numbness, tingling.  No visual changes.  No actual loss of consciousness.  Patient is tearful when approached for evaluation.  Denies any new or recent life stresses.  Patient states she has been eating and drinking normally.  Last menstrual period finished a few days ago.  No recent leg pains or swelling.  No dyspnea on exertion.  Patient states she was not doing anything in particular when episode started this evening.  Patient is current everyday smoker.  Denies drug use.  Rare alcohol use but none recently.            Review of Systems   All other systems reviewed and are negative.      Past Medical History:   Diagnosis Date   • Asthma    • Headache    • Hypertension    • PCOS (polycystic ovarian syndrome) 07/2013       No Known Allergies    Past Surgical History:   Procedure Laterality Date   • ADENOIDECTOMY     • TONSILLECTOMY  02/2008    adnoids also       Family History   Problem Relation Age of Onset   • Hypertension Mother    • Hypertension Maternal Grandmother    • Diabetes Maternal Grandfather    • Hypertension Paternal Grandmother    • Diabetes Paternal Grandfather        Social History     Socioeconomic History   • Marital status:      Spouse name: Not on file   • Number of children: Not on file   • Years of education: Not on file   • Highest education level: Not on file   Tobacco Use   • Smoking status: Current Every Day Smoker     Packs/day: 0.25   • Smokeless tobacco: Never Used    Substance and Sexual Activity   • Alcohol use: Yes   • Drug use: No   • Sexual activity: Yes     Partners: Male           Objective   Physical Exam   Constitutional: She is oriented to person, place, and time. She appears well-developed and well-nourished. No distress.   HENT:   Head: Normocephalic and atraumatic.   Mouth/Throat: Oropharynx is clear and moist. No oropharyngeal exudate.   Eyes: EOM are normal. Pupils are equal, round, and reactive to light.   Mildly injected conjunctiva, tearful   Neck: Normal range of motion. Neck supple.   Cardiovascular: Normal rate, regular rhythm and normal heart sounds.   Pulmonary/Chest: Effort normal and breath sounds normal. No respiratory distress.   Abdominal: Soft. She exhibits no distension. There is no tenderness. There is no guarding.   Musculoskeletal: Normal range of motion. She exhibits no edema, tenderness or deformity.   Lymphadenopathy:     She has no cervical adenopathy.   Neurological: She is alert and oriented to person, place, and time. No cranial nerve deficit or sensory deficit. Coordination normal.   Skin: Skin is warm and dry. Capillary refill takes less than 2 seconds. She is not diaphoretic.   Psychiatric: She has a normal mood and affect. Her behavior is normal. Judgment and thought content normal.       ECG 12 Lead    Date/Time: 11/15/2019 12:12 AM  Performed by: Kentrell Dia MD  Authorized by: Kentrell Dia MD   Interpreted by physician  Rhythm: sinus rhythm  Rate: normal  QRS axis: normal  ST Segments: ST segments normal  T Waves: T waves normal  Clinical impression: normal ECG                 ED Course                  MDM  Number of Diagnoses or Management Options  Near syncope:   Diagnosis management comments: Patient overall well-appearing.  Ambulated here without difficulty.  Patient has been tearful and appears to be having some disagreement with her significant other who is at bedside, but otherwise uneventful stay here in the  emergency department.  TSH was slightly elevated which was discussed with patient.  Advised recheck by PCP.  Return precautions discussed.  Advised PCP follow-up.      Final diagnoses:   Near syncope              Kentrell Dia MD  11/15/19 0206

## 2020-02-19 ENCOUNTER — HOSPITAL ENCOUNTER (EMERGENCY)
Facility: HOSPITAL | Age: 30
Discharge: HOME | End: 2020-02-20
Attending: EMERGENCY MEDICINE | Admitting: EMERGENCY MEDICINE

## 2020-02-19 VITALS
BODY MASS INDEX: 30.21 KG/M2 | HEART RATE: 79 BPM | HEIGHT: 69 IN | OXYGEN SATURATION: 99 % | DIASTOLIC BLOOD PRESSURE: 98 MMHG | RESPIRATION RATE: 16 BRPM | TEMPERATURE: 98.3 F | WEIGHT: 204 LBS | SYSTOLIC BLOOD PRESSURE: 150 MMHG

## 2020-02-19 DIAGNOSIS — R10.2 PELVIC PAIN AFFECTING PREGNANCY IN FIRST TRIMESTER, ANTEPARTUM: Primary | ICD-10-CM

## 2020-02-19 DIAGNOSIS — O26.891 PELVIC PAIN AFFECTING PREGNANCY IN FIRST TRIMESTER, ANTEPARTUM: Primary | ICD-10-CM

## 2020-02-19 LAB
ABO GROUP BLD: NORMAL
BASOPHILS # BLD AUTO: 0.07 10*3/MM3 (ref 0–0.2)
BASOPHILS NFR BLD AUTO: 0.6 % (ref 0–1.5)
BILIRUB UR QL STRIP: NEGATIVE
CLARITY UR: CLEAR
COLOR UR: YELLOW
DEPRECATED RDW RBC AUTO: 48.6 FL (ref 37–54)
EOSINOPHIL # BLD AUTO: 0.23 10*3/MM3 (ref 0–0.4)
EOSINOPHIL NFR BLD AUTO: 1.9 % (ref 0.3–6.2)
ERYTHROCYTE [DISTWIDTH] IN BLOOD BY AUTOMATED COUNT: 15 % (ref 12.3–15.4)
GLUCOSE UR STRIP-MCNC: NEGATIVE MG/DL
HCT VFR BLD AUTO: 38.2 % (ref 34–46.6)
HGB BLD-MCNC: 12.5 G/DL (ref 12–15.9)
HGB UR QL STRIP.AUTO: ABNORMAL
IMM GRANULOCYTES # BLD AUTO: 0.04 10*3/MM3 (ref 0–0.05)
IMM GRANULOCYTES NFR BLD AUTO: 0.3 % (ref 0–0.5)
KETONES UR QL STRIP: NEGATIVE
LEUKOCYTE ESTERASE UR QL STRIP.AUTO: NEGATIVE
LYMPHOCYTES # BLD AUTO: 4.99 10*3/MM3 (ref 0.7–3.1)
LYMPHOCYTES NFR BLD AUTO: 41.4 % (ref 19.6–45.3)
MCH RBC QN AUTO: 29.1 PG (ref 26.6–33)
MCHC RBC AUTO-ENTMCNC: 32.7 G/DL (ref 31.5–35.7)
MCV RBC AUTO: 89 FL (ref 79–97)
MONOCYTES # BLD AUTO: 1.27 10*3/MM3 (ref 0.1–0.9)
MONOCYTES NFR BLD AUTO: 10.5 % (ref 5–12)
NEUTROPHILS # BLD AUTO: 5.44 10*3/MM3 (ref 1.7–7)
NEUTROPHILS NFR BLD AUTO: 45.3 % (ref 42.7–76)
NITRITE UR QL STRIP: NEGATIVE
NRBC BLD AUTO-RTO: 0 /100 WBC (ref 0–0.2)
PH UR STRIP.AUTO: 5.5 [PH] (ref 4.5–8)
PLATELET # BLD AUTO: 339 10*3/MM3 (ref 140–450)
PMV BLD AUTO: 9.7 FL (ref 6–12)
PROT UR QL STRIP: NEGATIVE
RBC # BLD AUTO: 4.29 10*6/MM3 (ref 3.77–5.28)
RH BLD: POSITIVE
SP GR UR STRIP: <=1.005 (ref 1–1.03)
UROBILINOGEN UR QL STRIP: ABNORMAL
WBC NRBC COR # BLD: 12.04 10*3/MM3 (ref 3.4–10.8)

## 2020-02-19 PROCEDURE — 83690 ASSAY OF LIPASE: CPT | Performed by: EMERGENCY MEDICINE

## 2020-02-19 PROCEDURE — 85025 COMPLETE CBC W/AUTO DIFF WBC: CPT | Performed by: EMERGENCY MEDICINE

## 2020-02-19 PROCEDURE — 86900 BLOOD TYPING SEROLOGIC ABO: CPT | Performed by: EMERGENCY MEDICINE

## 2020-02-19 PROCEDURE — 99284 EMERGENCY DEPT VISIT MOD MDM: CPT | Performed by: EMERGENCY MEDICINE

## 2020-02-19 PROCEDURE — 99283 EMERGENCY DEPT VISIT LOW MDM: CPT

## 2020-02-19 PROCEDURE — 86901 BLOOD TYPING SEROLOGIC RH(D): CPT | Performed by: EMERGENCY MEDICINE

## 2020-02-19 PROCEDURE — 80053 COMPREHEN METABOLIC PANEL: CPT | Performed by: EMERGENCY MEDICINE

## 2020-02-19 PROCEDURE — 81001 URINALYSIS AUTO W/SCOPE: CPT | Performed by: EMERGENCY MEDICINE

## 2020-02-19 PROCEDURE — 84702 CHORIONIC GONADOTROPIN TEST: CPT | Performed by: EMERGENCY MEDICINE

## 2020-02-19 RX ORDER — SODIUM CHLORIDE 0.9 % (FLUSH) 0.9 %
10 SYRINGE (ML) INJECTION AS NEEDED
Status: DISCONTINUED | OUTPATIENT
Start: 2020-02-19 | End: 2020-02-20 | Stop reason: HOSPADM

## 2020-02-19 RX ORDER — PRENATAL VIT NO.126/IRON/FOLIC 28MG-0.8MG
TABLET ORAL DAILY
Status: ON HOLD | COMMUNITY
End: 2020-11-03

## 2020-02-19 RX ORDER — METOPROLOL TARTRATE 100 MG/1
100 TABLET ORAL DAILY
COMMUNITY
End: 2020-04-29

## 2020-02-20 ENCOUNTER — ANESTHESIA EVENT (OUTPATIENT)
Dept: PERIOP | Facility: HOSPITAL | Age: 30
End: 2020-02-20

## 2020-02-20 ENCOUNTER — PROCEDURE VISIT (OUTPATIENT)
Dept: OBSTETRICS AND GYNECOLOGY | Facility: CLINIC | Age: 30
End: 2020-02-20

## 2020-02-20 ENCOUNTER — OFFICE VISIT (OUTPATIENT)
Dept: OBSTETRICS AND GYNECOLOGY | Facility: CLINIC | Age: 30
End: 2020-02-20

## 2020-02-20 ENCOUNTER — ANESTHESIA (OUTPATIENT)
Dept: PERIOP | Facility: HOSPITAL | Age: 30
End: 2020-02-20

## 2020-02-20 ENCOUNTER — HOSPITAL ENCOUNTER (OUTPATIENT)
Facility: HOSPITAL | Age: 30
Setting detail: HOSPITAL OUTPATIENT SURGERY
Discharge: HOME | End: 2020-02-20
Attending: OBSTETRICS & GYNECOLOGY | Admitting: OBSTETRICS & GYNECOLOGY

## 2020-02-20 ENCOUNTER — APPOINTMENT (OUTPATIENT)
Dept: ULTRASOUND IMAGING | Facility: HOSPITAL | Age: 30
End: 2020-02-20

## 2020-02-20 VITALS
TEMPERATURE: 97.8 F | SYSTOLIC BLOOD PRESSURE: 98 MMHG | RESPIRATION RATE: 17 BRPM | HEART RATE: 61 BPM | DIASTOLIC BLOOD PRESSURE: 55 MMHG | OXYGEN SATURATION: 97 %

## 2020-02-20 VITALS
OXYGEN SATURATION: 99 % | TEMPERATURE: 97.3 F | WEIGHT: 211 LBS | SYSTOLIC BLOOD PRESSURE: 115 MMHG | RESPIRATION RATE: 16 BRPM | DIASTOLIC BLOOD PRESSURE: 96 MMHG | HEART RATE: 63 BPM | HEIGHT: 69 IN | BODY MASS INDEX: 31.25 KG/M2

## 2020-02-20 DIAGNOSIS — N93.9 VAGINAL BLEEDING: Primary | ICD-10-CM

## 2020-02-20 DIAGNOSIS — Z98.890 S/P LAPAROSCOPY: Primary | ICD-10-CM

## 2020-02-20 DIAGNOSIS — O00.90 ECTOPIC PREGNANCY, UNSPECIFIED LOCATION, UNSPECIFIED WHETHER INTRAUTERINE PREGNANCY PRESENT: ICD-10-CM

## 2020-02-20 DIAGNOSIS — O00.90 ECTOPIC PREGNANCY, UNSPECIFIED LOCATION, UNSPECIFIED WHETHER INTRAUTERINE PREGNANCY PRESENT: Primary | ICD-10-CM

## 2020-02-20 DIAGNOSIS — R10.2 PELVIC PAIN: Primary | ICD-10-CM

## 2020-02-20 DIAGNOSIS — O20.9 BLEEDING IN EARLY PREGNANCY: ICD-10-CM

## 2020-02-20 DIAGNOSIS — O02.81: ICD-10-CM

## 2020-02-20 LAB
ALBUMIN SERPL-MCNC: 4.1 G/DL (ref 3.5–5.2)
ALBUMIN/GLOB SERPL: 1.5 G/DL
ALP SERPL-CCNC: 52 U/L (ref 39–117)
ALT SERPL W P-5'-P-CCNC: 12 U/L (ref 1–33)
ANION GAP SERPL CALCULATED.3IONS-SCNC: 9.5 MMOL/L (ref 5–15)
AST SERPL-CCNC: 14 U/L (ref 1–32)
BACTERIA UR QL AUTO: ABNORMAL /HPF
BILIRUB SERPL-MCNC: <0.2 MG/DL (ref 0.2–1.2)
BUN BLD-MCNC: 8 MG/DL (ref 6–20)
BUN/CREAT SERPL: 11.3 (ref 7–25)
CALCIUM SPEC-SCNC: 9.3 MG/DL (ref 8.6–10.5)
CHLORIDE SERPL-SCNC: 102 MMOL/L (ref 98–107)
CO2 SERPL-SCNC: 26.5 MMOL/L (ref 22–29)
CREAT BLD-MCNC: 0.71 MG/DL (ref 0.57–1)
GFR SERPL CREATININE-BSD FRML MDRD: 97 ML/MIN/1.73
GLOBULIN UR ELPH-MCNC: 2.8 GM/DL
GLUCOSE BLD-MCNC: 88 MG/DL (ref 65–99)
HCG INTACT+B SERPL-ACNC: 2510 MIU/ML
HYALINE CASTS UR QL AUTO: ABNORMAL /LPF
LIPASE SERPL-CCNC: 22 U/L (ref 13–60)
POTASSIUM BLD-SCNC: 3.8 MMOL/L (ref 3.5–5.2)
PROT SERPL-MCNC: 6.9 G/DL (ref 6–8.5)
RBC # UR: ABNORMAL /HPF
REF LAB TEST METHOD: ABNORMAL
SODIUM BLD-SCNC: 138 MMOL/L (ref 136–145)
SQUAMOUS #/AREA URNS HPF: ABNORMAL /HPF
WBC UR QL AUTO: ABNORMAL /HPF

## 2020-02-20 PROCEDURE — 49320 DIAG LAPARO SEPARATE PROC: CPT | Performed by: OBSTETRICS & GYNECOLOGY

## 2020-02-20 PROCEDURE — 25010000002 NEOSTIGMINE 10 MG/10ML SOLUTION 10 ML VIAL: Performed by: ANESTHESIOLOGY

## 2020-02-20 PROCEDURE — 25010000002 HYDROMORPHONE PER 4 MG: Performed by: ANESTHESIOLOGY

## 2020-02-20 PROCEDURE — 25010000002 KETOROLAC TROMETHAMINE PER 15 MG: Performed by: ANESTHESIOLOGY

## 2020-02-20 PROCEDURE — 99282 EMERGENCY DEPT VISIT SF MDM: CPT

## 2020-02-20 PROCEDURE — S0260 H&P FOR SURGERY: HCPCS | Performed by: OBSTETRICS & GYNECOLOGY

## 2020-02-20 PROCEDURE — 76815 OB US LIMITED FETUS(S): CPT

## 2020-02-20 PROCEDURE — 25010000002 ONDANSETRON PER 1 MG: Performed by: NURSE ANESTHETIST, CERTIFIED REGISTERED

## 2020-02-20 PROCEDURE — 25010000002 SUCCINYLCHOLINE PER 20 MG: Performed by: ANESTHESIOLOGY

## 2020-02-20 PROCEDURE — 93976 VASCULAR STUDY: CPT

## 2020-02-20 PROCEDURE — 25010000002 PROPOFOL 10 MG/ML EMULSION: Performed by: ANESTHESIOLOGY

## 2020-02-20 PROCEDURE — 25010000002 DIPHENHYDRAMINE PER 50 MG: Performed by: NURSE ANESTHETIST, CERTIFIED REGISTERED

## 2020-02-20 PROCEDURE — 76830 TRANSVAGINAL US NON-OB: CPT | Performed by: OBSTETRICS & GYNECOLOGY

## 2020-02-20 PROCEDURE — 76817 TRANSVAGINAL US OBSTETRIC: CPT

## 2020-02-20 PROCEDURE — 25010000003 CEFAZOLIN SODIUM-DEXTROSE 2-3 GM-%(50ML) RECONSTITUTED SOLUTION: Performed by: OBSTETRICS & GYNECOLOGY

## 2020-02-20 PROCEDURE — 25010000002 MIDAZOLAM PER 1MG: Performed by: NURSE ANESTHETIST, CERTIFIED REGISTERED

## 2020-02-20 PROCEDURE — 25010000002 DEXAMETHASONE PER 1 MG: Performed by: NURSE ANESTHETIST, CERTIFIED REGISTERED

## 2020-02-20 PROCEDURE — 99024 POSTOP FOLLOW-UP VISIT: CPT | Performed by: OBSTETRICS & GYNECOLOGY

## 2020-02-20 PROCEDURE — 25010000002 FENTANYL CITRATE (PF) 100 MCG/2ML SOLUTION: Performed by: ANESTHESIOLOGY

## 2020-02-20 RX ORDER — IBUPROFEN 600 MG/1
600 TABLET ORAL EVERY 6 HOURS PRN
Qty: 30 TABLET | Refills: 0 | Status: SHIPPED | OUTPATIENT
Start: 2020-02-20 | End: 2020-04-29 | Stop reason: ALTCHOICE

## 2020-02-20 RX ORDER — PROPOFOL 10 MG/ML
VIAL (ML) INTRAVENOUS AS NEEDED
Status: DISCONTINUED | OUTPATIENT
Start: 2020-02-20 | End: 2020-02-20 | Stop reason: SURG

## 2020-02-20 RX ORDER — NEOSTIGMINE METHYLSULFATE 1 MG/ML
INJECTION, SOLUTION INTRAVENOUS AS NEEDED
Status: DISCONTINUED | OUTPATIENT
Start: 2020-02-20 | End: 2020-02-20 | Stop reason: SURG

## 2020-02-20 RX ORDER — FENTANYL CITRATE 50 UG/ML
INJECTION, SOLUTION INTRAMUSCULAR; INTRAVENOUS AS NEEDED
Status: DISCONTINUED | OUTPATIENT
Start: 2020-02-20 | End: 2020-02-20 | Stop reason: SURG

## 2020-02-20 RX ORDER — ONDANSETRON 2 MG/ML
4 INJECTION INTRAMUSCULAR; INTRAVENOUS ONCE AS NEEDED
Status: DISCONTINUED | OUTPATIENT
Start: 2020-02-20 | End: 2020-02-20 | Stop reason: HOSPADM

## 2020-02-20 RX ORDER — SODIUM CHLORIDE, SODIUM LACTATE, POTASSIUM CHLORIDE, CALCIUM CHLORIDE 600; 310; 30; 20 MG/100ML; MG/100ML; MG/100ML; MG/100ML
9 INJECTION, SOLUTION INTRAVENOUS CONTINUOUS
Status: DISCONTINUED | OUTPATIENT
Start: 2020-02-20 | End: 2020-02-20 | Stop reason: HOSPADM

## 2020-02-20 RX ORDER — GLYCOPYRROLATE 0.2 MG/ML
INJECTION INTRAMUSCULAR; INTRAVENOUS AS NEEDED
Status: DISCONTINUED | OUTPATIENT
Start: 2020-02-20 | End: 2020-02-20 | Stop reason: SURG

## 2020-02-20 RX ORDER — FAMOTIDINE 10 MG/ML
20 INJECTION, SOLUTION INTRAVENOUS
Status: COMPLETED | OUTPATIENT
Start: 2020-02-20 | End: 2020-02-20

## 2020-02-20 RX ORDER — LIDOCAINE HYDROCHLORIDE 10 MG/ML
0.5 INJECTION, SOLUTION EPIDURAL; INFILTRATION; INTRACAUDAL; PERINEURAL ONCE AS NEEDED
Status: COMPLETED | OUTPATIENT
Start: 2020-02-20 | End: 2020-02-20

## 2020-02-20 RX ORDER — ONDANSETRON 2 MG/ML
4 INJECTION INTRAMUSCULAR; INTRAVENOUS ONCE AS NEEDED
Status: COMPLETED | OUTPATIENT
Start: 2020-02-20 | End: 2020-02-20

## 2020-02-20 RX ORDER — ROCURONIUM BROMIDE 10 MG/ML
INJECTION, SOLUTION INTRAVENOUS AS NEEDED
Status: DISCONTINUED | OUTPATIENT
Start: 2020-02-20 | End: 2020-02-20 | Stop reason: SURG

## 2020-02-20 RX ORDER — KETAMINE HYDROCHLORIDE 10 MG/ML
INJECTION INTRAMUSCULAR; INTRAVENOUS AS NEEDED
Status: DISCONTINUED | OUTPATIENT
Start: 2020-02-20 | End: 2020-02-20 | Stop reason: SURG

## 2020-02-20 RX ORDER — SODIUM CHLORIDE 9 MG/ML
40 INJECTION, SOLUTION INTRAVENOUS AS NEEDED
Status: DISCONTINUED | OUTPATIENT
Start: 2020-02-20 | End: 2020-02-20 | Stop reason: HOSPADM

## 2020-02-20 RX ORDER — KETOROLAC TROMETHAMINE 30 MG/ML
INJECTION, SOLUTION INTRAMUSCULAR; INTRAVENOUS AS NEEDED
Status: DISCONTINUED | OUTPATIENT
Start: 2020-02-20 | End: 2020-02-20 | Stop reason: SURG

## 2020-02-20 RX ORDER — MAGNESIUM HYDROXIDE 1200 MG/15ML
LIQUID ORAL AS NEEDED
Status: DISCONTINUED | OUTPATIENT
Start: 2020-02-20 | End: 2020-02-20 | Stop reason: HOSPADM

## 2020-02-20 RX ORDER — SODIUM CHLORIDE 0.9 % (FLUSH) 0.9 %
10 SYRINGE (ML) INJECTION AS NEEDED
Status: DISCONTINUED | OUTPATIENT
Start: 2020-02-20 | End: 2020-02-20 | Stop reason: HOSPADM

## 2020-02-20 RX ORDER — MIDAZOLAM HYDROCHLORIDE 2 MG/2ML
1 INJECTION, SOLUTION INTRAMUSCULAR; INTRAVENOUS
Status: DISCONTINUED | OUTPATIENT
Start: 2020-02-20 | End: 2020-02-20 | Stop reason: HOSPADM

## 2020-02-20 RX ORDER — SUCCINYLCHOLINE CHLORIDE 20 MG/ML
INJECTION INTRAMUSCULAR; INTRAVENOUS AS NEEDED
Status: DISCONTINUED | OUTPATIENT
Start: 2020-02-20 | End: 2020-02-20 | Stop reason: SURG

## 2020-02-20 RX ORDER — DEXAMETHASONE SODIUM PHOSPHATE 4 MG/ML
8 INJECTION, SOLUTION INTRA-ARTICULAR; INTRALESIONAL; INTRAMUSCULAR; INTRAVENOUS; SOFT TISSUE ONCE
Status: COMPLETED | OUTPATIENT
Start: 2020-02-20 | End: 2020-02-20

## 2020-02-20 RX ORDER — SODIUM CHLORIDE 0.9 % (FLUSH) 0.9 %
10 SYRINGE (ML) INJECTION EVERY 12 HOURS SCHEDULED
Status: DISCONTINUED | OUTPATIENT
Start: 2020-02-20 | End: 2020-02-20 | Stop reason: HOSPADM

## 2020-02-20 RX ORDER — ACETAMINOPHEN 500 MG
1000 TABLET ORAL ONCE
Status: COMPLETED | OUTPATIENT
Start: 2020-02-20 | End: 2020-02-20

## 2020-02-20 RX ORDER — SODIUM CHLORIDE, SODIUM LACTATE, POTASSIUM CHLORIDE, CALCIUM CHLORIDE 600; 310; 30; 20 MG/100ML; MG/100ML; MG/100ML; MG/100ML
100 INJECTION, SOLUTION INTRAVENOUS CONTINUOUS
Status: DISCONTINUED | OUTPATIENT
Start: 2020-02-20 | End: 2020-02-20 | Stop reason: HOSPADM

## 2020-02-20 RX ORDER — OXYCODONE HYDROCHLORIDE AND ACETAMINOPHEN 5; 325 MG/1; MG/1
1 TABLET ORAL EVERY 4 HOURS PRN
Qty: 30 TABLET | Refills: 0 | Status: SHIPPED | OUTPATIENT
Start: 2020-02-20 | End: 2020-02-24

## 2020-02-20 RX ORDER — SCOLOPAMINE TRANSDERMAL SYSTEM 1 MG/1
1 PATCH, EXTENDED RELEASE TRANSDERMAL CONTINUOUS
Status: DISCONTINUED | OUTPATIENT
Start: 2020-02-20 | End: 2020-02-20 | Stop reason: HOSPADM

## 2020-02-20 RX ORDER — MIDAZOLAM HYDROCHLORIDE 2 MG/2ML
2 INJECTION, SOLUTION INTRAMUSCULAR; INTRAVENOUS
Status: DISCONTINUED | OUTPATIENT
Start: 2020-02-20 | End: 2020-02-20 | Stop reason: HOSPADM

## 2020-02-20 RX ORDER — HYDROMORPHONE HCL 110MG/55ML
PATIENT CONTROLLED ANALGESIA SYRINGE INTRAVENOUS AS NEEDED
Status: DISCONTINUED | OUTPATIENT
Start: 2020-02-20 | End: 2020-02-20 | Stop reason: SURG

## 2020-02-20 RX ORDER — CEFAZOLIN SODIUM 2 G/50ML
2 SOLUTION INTRAVENOUS ONCE
Status: COMPLETED | OUTPATIENT
Start: 2020-02-20 | End: 2020-02-20

## 2020-02-20 RX ORDER — OXYCODONE HYDROCHLORIDE AND ACETAMINOPHEN 5; 325 MG/1; MG/1
1 TABLET ORAL ONCE AS NEEDED
Status: COMPLETED | OUTPATIENT
Start: 2020-02-20 | End: 2020-02-20

## 2020-02-20 RX ORDER — DIPHENHYDRAMINE HYDROCHLORIDE 50 MG/ML
6.25 INJECTION INTRAMUSCULAR; INTRAVENOUS ONCE
Status: COMPLETED | OUTPATIENT
Start: 2020-02-20 | End: 2020-02-20

## 2020-02-20 RX ADMIN — OXYCODONE HYDROCHLORIDE AND ACETAMINOPHEN 1 TABLET: 5; 325 TABLET ORAL at 13:29

## 2020-02-20 RX ADMIN — FENTANYL CITRATE 25 MCG: 50 INJECTION, SOLUTION INTRAMUSCULAR; INTRAVENOUS at 11:48

## 2020-02-20 RX ADMIN — MIDAZOLAM HYDROCHLORIDE 2 MG: 1 INJECTION, SOLUTION INTRAMUSCULAR; INTRAVENOUS at 11:15

## 2020-02-20 RX ADMIN — SUCCINYLCHOLINE CHLORIDE 120 MG: 20 INJECTION, SOLUTION INTRAMUSCULAR; INTRAVENOUS at 11:50

## 2020-02-20 RX ADMIN — GLYCOPYRROLATE 0.4 MG: 0.2 INJECTION INTRAMUSCULAR; INTRAVENOUS at 12:21

## 2020-02-20 RX ADMIN — KETOROLAC TROMETHAMINE 30 MG: 30 INJECTION INTRAMUSCULAR; INTRAVENOUS at 12:32

## 2020-02-20 RX ADMIN — DIPHENHYDRAMINE HYDROCHLORIDE 6.25 MG: 50 INJECTION, SOLUTION INTRAMUSCULAR; INTRAVENOUS at 11:14

## 2020-02-20 RX ADMIN — ACETAMINOPHEN 1000 MG: 500 TABLET, FILM COATED ORAL at 11:15

## 2020-02-20 RX ADMIN — FENTANYL CITRATE 50 MCG: 50 INJECTION, SOLUTION INTRAMUSCULAR; INTRAVENOUS at 12:32

## 2020-02-20 RX ADMIN — ROCURONIUM BROMIDE 10 MG: 10 INJECTION, SOLUTION INTRAVENOUS at 11:59

## 2020-02-20 RX ADMIN — LIDOCAINE HYDROCHLORIDE 0.1 ML: 10 INJECTION, SOLUTION EPIDURAL; INFILTRATION; INTRACAUDAL; PERINEURAL at 11:07

## 2020-02-20 RX ADMIN — SODIUM CHLORIDE, POTASSIUM CHLORIDE, SODIUM LACTATE AND CALCIUM CHLORIDE 9 ML/HR: 600; 310; 30; 20 INJECTION, SOLUTION INTRAVENOUS at 11:07

## 2020-02-20 RX ADMIN — NEOSTIGMINE METHYLSULFATE 3 MG: 1 INJECTION INTRAVENOUS at 12:21

## 2020-02-20 RX ADMIN — CEFAZOLIN SODIUM 2 G: 2 SOLUTION INTRAVENOUS at 11:46

## 2020-02-20 RX ADMIN — GLYCOPYRROLATE 0.2 MG: 0.2 INJECTION INTRAMUSCULAR; INTRAVENOUS at 11:45

## 2020-02-20 RX ADMIN — PROPOFOL 150 MG: 10 INJECTION, EMULSION INTRAVENOUS at 11:50

## 2020-02-20 RX ADMIN — SCOPALAMINE 1 PATCH: 1 PATCH, EXTENDED RELEASE TRANSDERMAL at 11:15

## 2020-02-20 RX ADMIN — FAMOTIDINE 20 MG: 10 INJECTION INTRAVENOUS at 11:15

## 2020-02-20 RX ADMIN — ONDANSETRON 4 MG: 2 INJECTION, SOLUTION INTRAMUSCULAR; INTRAVENOUS at 11:14

## 2020-02-20 RX ADMIN — DEXAMETHASONE SODIUM PHOSPHATE 8 MG: 4 INJECTION, SOLUTION INTRAMUSCULAR; INTRAVENOUS at 11:14

## 2020-02-20 RX ADMIN — FENTANYL CITRATE 25 MCG: 50 INJECTION, SOLUTION INTRAMUSCULAR; INTRAVENOUS at 12:05

## 2020-02-20 RX ADMIN — KETAMINE HYDROCHLORIDE 30 MG: 10 INJECTION, SOLUTION INTRAMUSCULAR; INTRAVENOUS at 12:05

## 2020-02-20 RX ADMIN — HYDROMORPHONE HYDROCHLORIDE 1 MG: 2 INJECTION, SOLUTION INTRAMUSCULAR; INTRAVENOUS; SUBCUTANEOUS at 12:44

## 2020-02-20 NOTE — PROGRESS NOTES
New GYN Exam    CC- Here for possible ectopic pregnancy    Es Branch is a 29 y.o. female new patient who presents for possible ectopic pregnancy.  She is a new patient to me and all of her problems are new.  She was seen in the ER last night complaining of right lower quadrant pain.  Her LMP was 2019.  Her cycles are regular.  She has been trying to get pregnant for 8 years.  Her quantitative hCG was 2510.  She had an ultrasound which did not show an IUP.  She is Rh+.  Her ultrasound here shows an anteverted uterus with an endometrial lining 1.6 cm.  There is no gestational sac or intrauterine pregnancy.  She does have a left ovarian cyst that measures 0.9 x 0.9 cm.  This ultrasound was compared her scan in the ER from last night.  We did discuss that her exam and history are concerning for ectopic pregnancy and we will proceed with diagnostic laparoscopy.       OB History        1    Para        Term                AB        Living           SAB        TAB        Ectopic        Molar        Multiple        Live Births                    Menarche: 13  Current contraception: none  History of abnormal Pap smear: no  History of abnormal mammogram: no  Family history of uterine, colon or ovarian cancer: MGM ovarian and cervical  Family history of breast cancer: no  H/o STDs: none  Last pap:?  Gardasil:missed  ASAEL: m aunt DVT    Health Maintenance   Topic Date Due   • Annual Gynecologic Pelvic and Breast Exam  1990   • ANNUAL PHYSICAL  1993   • PAP SMEAR  2017   • INFLUENZA VACCINE  2019   • TDAP/TD VACCINES (3 - Td) 10/30/2026   • PNEUMOCOCCAL VACCINE (19-64 MEDIUM RISK)  Completed       Past Medical History:   Diagnosis Date   • Asthma    • Headache    • Hypertension    • PCOS (polycystic ovarian syndrome) 2013       Past Surgical History:   Procedure Laterality Date   • ADENOIDECTOMY     • TONSILLECTOMY  2008    adnoids also       No current facility-administered  medications for this visit.   No current outpatient medications on file.    No Known Allergies    Social History     Tobacco Use   • Smoking status: Current Every Day Smoker     Packs/day: 0.25     Types: Cigarettes   • Smokeless tobacco: Never Used   • Tobacco comment: states is in the process of quitting   Substance Use Topics   • Alcohol use: Not Currently   • Drug use: No       Family History   Problem Relation Age of Onset   • Hypertension Mother    • Ovarian cancer Mother    • Hypertension Maternal Grandmother    • Diabetes Maternal Grandfather    • Hypertension Paternal Grandmother    • Diabetes Paternal Grandfather    • Deep vein thrombosis Maternal Aunt    • Breast cancer Neg Hx    • Colon cancer Neg Hx    • Pulmonary embolism Neg Hx    • Anesthesia problems Neg Hx        Review of Systems   Constitutional: Negative for appetite change, fatigue, fever and unexpected weight change.   Eyes: Negative for photophobia and visual disturbance.   Respiratory: Negative for cough and shortness of breath.    Cardiovascular: Negative for chest pain and palpitations.   Gastrointestinal: Positive for abdominal pain. Negative for abdominal distention, constipation, diarrhea and nausea.   Endocrine: Negative for cold intolerance and heat intolerance.   Genitourinary: Positive for pelvic pain and vaginal bleeding. Negative for dyspareunia, dysuria, menstrual problem and vaginal discharge.   Musculoskeletal: Negative for back pain.   Skin: Negative for color change and rash.   Neurological: Negative for headaches.   Hematological: Negative for adenopathy. Does not bruise/bleed easily.   Psychiatric/Behavioral: Positive for dysphoric mood. The patient is nervous/anxious.        LMP 11/07/2019     Physical Exam   Constitutional: She is oriented to person, place, and time. She appears well-developed and well-nourished.   HENT:   Head: Normocephalic and atraumatic.   Eyes: Conjunctivae are normal. No scleral icterus.   Neck:  Neck supple. No thyromegaly present.   Cardiovascular: Normal rate and regular rhythm.   Pulmonary/Chest: Effort normal and breath sounds normal.   Abdominal: Soft. Bowel sounds are normal. She exhibits no distension and no mass. There is tenderness. There is no rebound and no guarding. No hernia.   Neurological: She is alert and oriented to person, place, and time.   Skin: Skin is warm and dry.   Psychiatric: She has a normal mood and affect. Her behavior is normal. Judgment and thought content normal.   Nursing note and vitals reviewed.            Assessment/Plan  1) Probable ectopic pregnancy-plan diagnostic laparoscopy, possible salpingectomy, possible salpingostomy.  We discussed that these might best surgical judgment at the time of her procedure to determine whether or not her tube is salvageable.  If she does have a salpingostomy, she will need follow-up for serial quantitative hCGs.Risks, benefits and alternatives of the procedure were discussed, including , but not limited to: infection, bleeding, transfusion, injury to adjacent structures, laparotomy, possible non diagnostic findings, possible findings of unexpected malignancy, reoperation, recurrent symptoms, thromboembolic events, aneasthesic complications and death. Pre/intra/postop course was reviewed and all questions answered. Patient was encouraged to call for any additional questions she might have in the future.   2) Infertility- enc pt to f/u postop for workup.     Diagnoses and all orders for this visit:    Ectopic pregnancy, unspecified location, unspecified whether intrauterine pregnancy present  -     Case Request; Standing  -     Case Request    Other orders  -     Follow Anesthesia Guidelines / Protocol; Future  -     Chlorhexidine Skin Prep; Future          Imelda Rayo MD  02/20/2020  10:41 AM

## 2020-02-20 NOTE — ED TRIAGE NOTES
Pt to ED for c/o pelvic pain starting around 8pm yesterday and reports being 7 weeks pregnant.  Pt reports being seen at [facility] but was unable to have an ultrasound and wants peace of mind.  Pt reports bleeding after pelvic exam.

## 2020-02-20 NOTE — ED PROVIDER NOTES
MD ATTESTATION NOTE    Pt presents to the ED complaining of right lower pelvic pain that began last night. Patient reports she is 7 weeks gestation. Patient was seen at [facility] earlier this evening for the same symptoms.     Physical Exam:  Awake, alert, and oriented. No distress. Minimal lower abdominal tenderness. No masses or rebound.     Plan: Review labs    The MICHELLE and I have discussed this patient's history, physical exam, and treatment plan.  I have reviewed the documentation and personally had a face to face interaction with the patient. I affirm the documentation and agree with the treatment and plan.  The attached note describes my personal findings.    Documentation assistance provided by cynthia York for Dr. Juan Alberto MD.  Information recorded by the scribe was done at my direction and has been verified and validated by me.     Kylie York  02/20/20 0251       Duglas Avendano MD  02/20/20 0456

## 2020-02-20 NOTE — H&P
Imelda Rayo MD   Physician   Obstetrics and Gynecology   Progress Notes   Signed   Encounter Date:  2020          Related encounter: Office Visit from 2020 in [facility] with Imelda Rayo MD         Signed        Expand All Collapse All      Show:Clear all  [x]Manual[x]Template[]Copied    Added by:  [x]Imelda Rayo MD    []Hover for details  New GYN Exam     CC- Here for possible ectopic pregnancy     Es Branch is a 29 y.o. female new patient who presents for possible ectopic pregnancy.  She is a new patient to me and all of her problems are new.  She was seen in the ER last night complaining of right lower quadrant pain.  Her LMP was 2019.  Her cycles are regular.  She has been trying to get pregnant for 8 years.  Her quantitative hCG was 2510.  She had an ultrasound which did not show an IUP.  She is Rh+.  Her ultrasound here shows an anteverted uterus with an endometrial lining 1.6 cm.  There is no gestational sac or intrauterine pregnancy.  She does have a left ovarian cyst that measures 0.9 x 0.9 cm.  This ultrasound was compared her scan in the ER from last night.  We did discuss that her exam and history are concerning for ectopic pregnancy and we will proceed with diagnostic laparoscopy.                 OB History         1    Para        Term                AB        Living            SAB        TAB        Ectopic        Molar        Multiple        Live Births                       Menarche: 13  Current contraception: none  History of abnormal Pap smear: no  History of abnormal mammogram: no  Family history of uterine, colon or ovarian cancer: MGM ovarian and cervical  Family history of breast cancer: no  H/o STDs: none  Last pap:?  Gardasil:missed  ASAEL: m aunt DVT          Health Maintenance   Topic Date Due   • Annual Gynecologic Pelvic and Breast Exam  1990   • ANNUAL PHYSICAL  1993    • PAP SMEAR  07/11/2017   • INFLUENZA VACCINE  08/01/2019   • TDAP/TD VACCINES (3 - Td) 10/30/2026   • PNEUMOCOCCAL VACCINE (19-64 MEDIUM RISK)  Completed         Medical History        Past Medical History:   Diagnosis Date   • Asthma     • Headache     • Hypertension     • PCOS (polycystic ovarian syndrome) 07/2013            Surgical History         Past Surgical History:   Procedure Laterality Date   • ADENOIDECTOMY       • TONSILLECTOMY   02/2008     adnoids also            No current facility-administered medications for this visit.   No current outpatient medications on file.     No Known Allergies     Social History            Tobacco Use   • Smoking status: Current Every Day Smoker       Packs/day: 0.25       Types: Cigarettes   • Smokeless tobacco: Never Used   • Tobacco comment: states is in the process of quitting   Substance Use Topics   • Alcohol use: Not Currently   • Drug use: No               Family History   Problem Relation Age of Onset   • Hypertension Mother     • Ovarian cancer Mother     • Hypertension Maternal Grandmother     • Diabetes Maternal Grandfather     • Hypertension Paternal Grandmother     • Diabetes Paternal Grandfather     • Deep vein thrombosis Maternal Aunt     • Breast cancer Neg Hx     • Colon cancer Neg Hx     • Pulmonary embolism Neg Hx     • Anesthesia problems Neg Hx           Review of Systems   Constitutional: Negative for appetite change, fatigue, fever and unexpected weight change.   Eyes: Negative for photophobia and visual disturbance.   Respiratory: Negative for cough and shortness of breath.    Cardiovascular: Negative for chest pain and palpitations.   Gastrointestinal: Positive for abdominal pain. Negative for abdominal distention, constipation, diarrhea and nausea.   Endocrine: Negative for cold intolerance and heat intolerance.   Genitourinary: Positive for pelvic pain and vaginal bleeding. Negative for dyspareunia, dysuria, menstrual problem and vaginal  discharge.   Musculoskeletal: Negative for back pain.   Skin: Negative for color change and rash.   Neurological: Negative for headaches.   Hematological: Negative for adenopathy. Does not bruise/bleed easily.   Psychiatric/Behavioral: Positive for dysphoric mood. The patient is nervous/anxious.          LMP 11/07/2019      Physical Exam   Constitutional: She is oriented to person, place, and time. She appears well-developed and well-nourished.   HENT:   Head: Normocephalic and atraumatic.   Eyes: Conjunctivae are normal. No scleral icterus.   Neck: Neck supple. No thyromegaly present.   Cardiovascular: Normal rate and regular rhythm.   Pulmonary/Chest: Effort normal and breath sounds normal.   Abdominal: Soft. Bowel sounds are normal. She exhibits no distension and no mass. There is tenderness. There is no rebound and no guarding. No hernia.   Neurological: She is alert and oriented to person, place, and time.   Skin: Skin is warm and dry.   Psychiatric: She has a normal mood and affect. Her behavior is normal. Judgment and thought content normal.   Nursing note and vitals reviewed.              Assessment/Plan  1) Probable ectopic pregnancy-plan diagnostic laparoscopy, possible salpingectomy, possible salpingostomy.  We discussed that these might best surgical judgment at the time of her procedure to determine whether or not her tube is salvageable.  If she does have a salpingostomy, she will need follow-up for serial quantitative hCGs.Risks, benefits and alternatives of the procedure were discussed, including , but not limited to: infection, bleeding, transfusion, injury to adjacent structures, laparotomy, possible non diagnostic findings, possible findings of unexpected malignancy, reoperation, recurrent symptoms, thromboembolic events, aneasthesic complications and death. Pre/intra/postop course was reviewed and all questions answered. Patient was encouraged to call for any additional questions she might have  in the future.   2) Infertility- enc pt to f/u postop for workup.      Diagnoses and all orders for this visit:     Ectopic pregnancy, unspecified location, unspecified whether intrauterine pregnancy present  -     Case Request; Standing  -     Case Request     Other orders  -     Follow Anesthesia Guidelines / Protocol; Future  -     Chlorhexidine Skin Prep; Future              Imelda Rayo MD  02/20/2020  10:41 AM

## 2020-02-20 NOTE — ANESTHESIA PROCEDURE NOTES
Airway  Urgency: elective    Date/Time: 2/20/2020 11:54 AM  Airway not difficult    General Information and Staff    Patient location during procedure: OR  Anesthesiologist: Delfina Cunha MD    Indications and Patient Condition  Indications for airway management: airway protection    Preoxygenated: yes  MILS not maintained throughout  Mask difficulty assessment: 1 - vent by mask    Final Airway Details  Final airway type: endotracheal airway      Successful airway: ETT  Cuffed: no   Successful intubation technique: direct laryngoscopy  Facilitating devices/methods: intubating stylet and anterior pressure/BURP  Endotracheal tube insertion site: oral  Blade: Michaud  Blade size: 2  ETT size (mm): 7.5  Cormack-Lehane Classification: grade I - full view of glottis  Placement verified by: chest auscultation and capnometry   Measured from: lips  ETT/EBT  to lips (cm): 21  Number of attempts at approach: 2  Assessment: lips, teeth, and gum same as pre-op and atraumatic intubation

## 2020-02-20 NOTE — ED PROVIDER NOTES
Subjective     History provided by:  Patient    History of Present Illness    · Chief complaint: Abdominal pain    · Location: Suprapubic and right lower quadrant    · Quality/Severity: Crampy and sharp and moderately severe    · Timing/Onset: Pain started with micrograms 3 weeks ago, but got significantly worse over the last 2 hours.    · Modifying Factors: Movement exacerbates pain    · Associated symptoms: Nausea without vomiting.  Denies any hematuria or dysuria.  Denies any vaginal bleeding.    · Narrative: The patient is a 29-year-old white female who 3 weeks ago developed some suprapubic and right lower quadrant light cramping sensation that would go and come.  Over the last 2 hours of cramping sensation got worse and she has had sharp pains.  She states the pain is made her nauseated.  She is a  1 para 0 and is currently 7 weeks 4 days pregnant.  She is not had any vaginal bleeding.  He denies any hematuria or dysuria, but does report urinary frequency.  Past medical history significant for polycystic ovary syndrome and hypertension.  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  She smokes 1/4 pack/day.  She has her first obstetric appointment with [facility] on 3/3/2020.    Review of Systems   Constitutional: Negative for activity change, appetite change, chills, diaphoresis, fatigue and fever.   HENT: Negative for congestion, dental problem, ear pain, hearing loss, mouth sores, postnasal drip, rhinorrhea, sinus pressure, sore throat and voice change.    Eyes: Negative for photophobia, pain, discharge, redness and visual disturbance.   Respiratory: Negative for cough, chest tightness, shortness of breath, wheezing and stridor.    Cardiovascular: Negative for chest pain, palpitations and leg swelling.   Gastrointestinal: Positive for abdominal pain and nausea. Negative for diarrhea and vomiting.   Genitourinary: Positive for pelvic pain. Negative for difficulty urinating, dysuria, flank pain,  "frequency, hematuria, urgency and vaginal bleeding.   Musculoskeletal: Negative for arthralgias, back pain, gait problem, joint swelling, myalgias, neck pain and neck stiffness.   Skin: Negative for color change and rash.   Neurological: Negative for dizziness, tremors, seizures, syncope, facial asymmetry, speech difficulty, weakness, light-headedness, numbness and headaches.   Hematological: Negative for adenopathy.   Psychiatric/Behavioral: Negative.  Negative for confusion and decreased concentration. The patient is not nervous/anxious.      Past Medical History:   Diagnosis Date   • Asthma    • Headache    • Hypertension    • PCOS (polycystic ovarian syndrome) 07/2013     /98   Pulse 79   Temp 98.3 °F (36.8 °C) (Oral)   Resp 16   Ht 175.3 cm (69\")   Wt 92.5 kg (204 lb)   LMP 11/07/2019   SpO2 99%   BMI 30.13 kg/m²     Past Medical History:   Diagnosis Date   • Asthma    • Headache    • Hypertension    • PCOS (polycystic ovarian syndrome) 07/2013       No Known Allergies    Past Surgical History:   Procedure Laterality Date   • ADENOIDECTOMY     • TONSILLECTOMY  02/2008    adnoids also       Family History   Problem Relation Age of Onset   • Hypertension Mother    • Hypertension Maternal Grandmother    • Diabetes Maternal Grandfather    • Hypertension Paternal Grandmother    • Diabetes Paternal Grandfather        Social History     Socioeconomic History   • Marital status:      Spouse name: Not on file   • Number of children: Not on file   • Years of education: Not on file   • Highest education level: Not on file   Tobacco Use   • Smoking status: Current Every Day Smoker     Packs/day: 0.25   • Smokeless tobacco: Never Used   Substance and Sexual Activity   • Alcohol use: Yes   • Drug use: No   • Sexual activity: Yes     Partners: Male           Objective   Physical Exam   Constitutional: She is oriented to person, place, and time. She appears well-developed and well-nourished. No distress. "   The patient appears healthy in no acute distress.  Review of her vital signs: She is afebrile with a temperature 98.3, blood pressure mildly elevated 150/98, heart rate normal 79, respirations are normal 16 with a normal oxygen saturation of 99% on room air.   HENT:   Head: Normocephalic and atraumatic.   Nose: Nose normal.   Mouth/Throat: Oropharynx is clear and moist. No oropharyngeal exudate.   Eyes: Pupils are equal, round, and reactive to light. EOM are normal. Right eye exhibits no discharge. Left eye exhibits no discharge. No scleral icterus.   Neck: Normal range of motion. Neck supple. No JVD present. No thyromegaly present.   Cardiovascular: Normal rate, regular rhythm and normal heart sounds.   No murmur heard.  Pulmonary/Chest: Effort normal and breath sounds normal. She has no wheezes. She has no rales. She exhibits no tenderness.   Abdominal: Soft. Bowel sounds are normal. She exhibits no distension. There is no hepatosplenomegaly. There is tenderness in the right lower quadrant and suprapubic area. There is tenderness at McBurney's point. There is no rigidity, no rebound, no guarding, no CVA tenderness and negative Lancaster's sign.   Genitourinary: Cervix exhibits no motion tenderness. Right adnexum displays tenderness. Vaginal discharge ( Brownish consistent with possible blood-tinged) found.   Genitourinary Comments: The patient is too obese to palpate uterine size or adnexal masses.  Subjectively has some left adnexal uterine tenderness.  She did not have a chandelier sign.   Musculoskeletal: Normal range of motion. She exhibits no edema, tenderness or deformity.   Lymphadenopathy:     She has no cervical adenopathy.   Neurological: She is alert and oriented to person, place, and time. No cranial nerve deficit. Coordination normal.   No focal motor sensory deficit   Skin: Skin is warm and dry. Capillary refill takes less than 2 seconds. No rash noted. She is not diaphoretic.   Psychiatric: She has a  normal mood and affect. Her behavior is normal. Judgment and thought content normal.   Nursing note and vitals reviewed.      Procedures           ED Course  ED Course as of Feb 20 0057   Thu Feb 20, 2020 0050 Review the patient's laboratory studies: Her CBC had a slightly elevated white count of 12 with a normal differential.  Hemoglobin, hematocrit were normal 12.5/38.2.  CMP was normal.  Urinalysis was negative for nitrites and leukocyte esterases.  Microscopic exam of the urine revealed 3-5 RBCs and 3-5 WBCs with trace bacteria.  This is not significant enough to diagnose a UTI.  Blood type is O+.  Quantitative hCG is 2510.    [TP]   0051 The patient has subjective right adnexal tenderness.  She is too obese to appreciate uterine size or adnexal mass.  Ultrasound is unavailable at this time.    [TP]   0056 00:20 the patient was discussed with Dr. Rayo, OB on-call, who requested the patient call her office at 08:30 to schedule a pelvic ultrasound to be performed tomorrow morning.    [TP]      ED Course User Index  [TP] Kentrell De Jesus MD                                           MDM  Number of Diagnoses or Management Options  Pelvic pain affecting pregnancy in first trimester, antepartum: new and requires workup     Amount and/or Complexity of Data Reviewed  Clinical lab tests: ordered and reviewed  Discuss the patient with other providers: yes    Risk of Complications, Morbidity, and/or Mortality  Presenting problems: high  Diagnostic procedures: high  Management options: high  General comments: My differential diagnosis for abdominal pain includes but is not limited to:  Gastritis, gastroenteritis, peptic ulcer disease, GERD, esophageal perforation, acute appendicitis, mesenteric adenitis, Meckel’s diverticulum, epiploic appendagitis, diverticulitis, colon cancer, ulcerative colitis, Crohn’s disease, intussusception, small bowel obstruction, adhesions, ischemic bowel, perforated viscus, ileus,  obstipation, biliary colic, cholecystitis, cholelithiasis, Karel-Isra Wilfrido, hepatitis, pancreatitis, common bile duct obstruction, cholangitis, bile leak, splenic trauma, splenic rupture, splenic infarction, splenic abscess, abdominal abscess, ascites, spontaneous bacterial peritonitis, hernia, UTI, cystitis, prostatitis, ureterolithiasis, urinary obstruction, ovarian cyst, torsion, pregnancy, ectopic pregnancy, PID, pelvic abscess, mittelschmerz, endometriosis, AAA, myocardial infarction, pneumonia, cancer, porphyria, DKA, medications, sickle cell, viral syndrome, zoster    Patient Progress  Patient progress: stable      Final diagnoses:   Pelvic pain affecting pregnancy in first trimester, antepartum           Labs Reviewed   COMPREHENSIVE METABOLIC PANEL - Abnormal; Notable for the following components:       Result Value    Total Bilirubin <0.2 (*)     All other components within normal limits    Narrative:     GFR Normal >60  Chronic Kidney Disease <60  Kidney Failure <15     URINALYSIS W/ MICROSCOPIC IF INDICATED (NO CULTURE) - Abnormal; Notable for the following components:    Blood, UA Small (1+) (*)     All other components within normal limits   CBC WITH AUTO DIFFERENTIAL - Abnormal; Notable for the following components:    WBC 12.04 (*)     Lymphocytes, Absolute 4.99 (*)     Monocytes, Absolute 1.27 (*)     All other components within normal limits   URINALYSIS, MICROSCOPIC ONLY - Abnormal; Notable for the following components:    RBC, UA 3-5 (*)     WBC, UA 3-5 (*)     Bacteria, UA Trace (*)     All other components within normal limits   LIPASE - Normal   HCG, QUANTITATIVE, PREGNANCY    Narrative:     HCG Ranges by Gestational Age    Females - non-pregnant premenopausal   </= 1mIU/mL HCG  Females - postmenopausal               </= 7mIU/mL HCG    3 Weeks         5.4 -      72 mIU/mL  4 Weeks        10.2 -     708 mIU/mL  5 Weeks       217   -   8,245 mIU/mL  6 Weeks       152   -  32,177 mIU/mL  7 Weeks      4,059   - 153,767 mIU/mL  8 Weeks    31,366   - 149,094 mIU/mL  9 Weeks    59,109   - 135,901 mIU/mL  10 Weeks   44,186   - 170,409 mIU/mL  12 Weeks   27,107   - 201,615 mIU/mL  14 Weeks   24,302   -  93,646 mIU/mL  15 Weeks   12,540   -  69,747 mIU/mL  16 Weeks    8,904   -  55,332 mIU/mL  17 Weeks    8,240   -  51,793 mIU/mL  18 Weeks    9,649   -  55,271 mIU/mL    Results may be falsely decreased if patient taking Biotin.     ABO/RH   CBC AND DIFFERENTIAL    Narrative:     The following orders were created for panel order CBC & Differential.  Procedure                               Abnormality         Status                     ---------                               -----------         ------                     CBC Auto Differential[[id number]]        Abnormal            Final result                 Please view results for these tests on the individual orders.     No orders to display          Medication List      No changes were made to your prescriptions during this visit.            Kentrell De Jesus MD  02/20/20 0057

## 2020-02-20 NOTE — ANESTHESIA PREPROCEDURE EVALUATION
Anesthesia Evaluation     Patient summary reviewed and Nursing notes reviewed   no history of anesthetic complications:  NPO Solid Status: > 8 hours  NPO Liquid Status: > 2 hours           Airway   Mallampati: II  TM distance: >3 FB  Neck ROM: full  No difficulty expected  Dental      Pulmonary     breath sounds clear to auscultation  (+) a smoker Current Smoked day of surgery, asthma (no inhalers, allergy related ),    ROS comment: Study Result     CHEST X-RAY, 11/15/2019       HISTORY:    29-year-old female in the ED with syncopal episode tonight. Three day history of dizziness, fatigue.       TECHNIQUE:  PA lateral upright chest series.       FINDINGS:  Heart size and pulmonary vascularity are normal. The lungs are expanded and clear. No visible pulmonary infiltrate or pleural effusion.     IMPRESSION:  Negative chest.     Signer Name: Piyush Barnes MD   Signed: 11/15/2019 1:56 AM   Workstation Name: [email or web address]    [facility]      Cardiovascular   Exercise tolerance: good (4-7 METS)    ECG reviewed  Patient on routine beta blocker and Beta blocker given within 24 hours of surgery  Rhythm: regular  Rate: normal    (+) hypertension well controlled less than 2 medications,     ROS comment: ED Interpretation     Kentrell Dia MD     11/15/2019  2:04 AM  ECG 12 Lead    Date/Time: 11/15/2019 12:12 AM  Performed by: Kentrell Dia MD  Authorized by: Kentrell Dia MD   Interpreted by physician  Rhythm: sinus rhythm  Rate: normal  QRS axis: normal  ST Segments: ST segments normal  T Waves: T waves normal  Clinical impression: normal ECG  Interpreted by Kentrell Dia MD on 11/15/2019  2:04 AM        Neuro/Psych  (+) headaches, syncope,     GI/Hepatic/Renal/Endo    (+) obesity,       Musculoskeletal     Abdominal   (+) obese,    Substance History   (+) alcohol use (rare ),      OB/GYN          Other                        Anesthesia Plan    ASA 2     general      intravenous induction     Anesthetic plan, all risks, benefits, and alternatives have been provided, discussed and informed consent has been obtained with: patient.  Use of blood products discussed with patient  Consented to blood products.

## 2020-02-20 NOTE — ED PROVIDER NOTES
" EMERGENCY DEPARTMENT ENCOUNTER    CHIEF COMPLAINT  Chief Complaint: Suprapubic abd pain  History given by: patient  History limited by: nothing  Room Number: 23/23  PMD: Rosetta Adams MD  OB: Dr. Elliot Parker    HPI:  Pt is a 29 y.o. female who presents 7 weeks gestation complaining of R sided suprapubic abd pain that worsned last night at 1900. Pt also c/o nausea. Pt denies vomiting. The pt developed intermittent \"light cramping\" abd pain 3 weeks ago. This evening, the pain became more focal and constant around 1900, to the R sided suprapubic abd pain with nausea. She was seen at [facility] last night for the pain, where she had a pelvic exam however unable to receive an US at that time. She states that after the pelvic exam, she experienced moderate vaginal bleeding as it had saturated her clothes. After being d/c, the pt decided to come to [facility] to receive an US.     Duration:  1900 last night  Onset: gradual  Timing: constant  Location: R sided suprapubic  Radiation: none stated  Quality: pain  Intensity/Severity: moderate  Progression: worsening   Associated Symptoms: nausea  Aggravating Factors: none stated  Alleviating Factors: none stated  Previous Episodes: none stated  Treatment before arrival: Pt was seen in the [facility] where she had a pelvic exam.     PAST MEDICAL HISTORY  Active Ambulatory Problems     Diagnosis Date Noted   • No Active Ambulatory Problems     Resolved Ambulatory Problems     Diagnosis Date Noted   • No Resolved Ambulatory Problems     Past Medical History:   Diagnosis Date   • Asthma    • Headache    • Hypertension    • PCOS (polycystic ovarian syndrome) 07/2013       PAST SURGICAL HISTORY  Past Surgical History:   Procedure Laterality Date   • ADENOIDECTOMY     • TONSILLECTOMY  02/2008    adnoids also       FAMILY HISTORY  Family History   Problem Relation Age of Onset   • Hypertension Mother    • Hypertension Maternal Grandmother    • Diabetes Maternal Grandfather    • " Hypertension Paternal Grandmother    • Diabetes Paternal Grandfather        SOCIAL HISTORY  Social History     Socioeconomic History   • Marital status:      Spouse name: Not on file   • Number of children: Not on file   • Years of education: Not on file   • Highest education level: Not on file   Tobacco Use   • Smoking status: Current Every Day Smoker     Packs/day: 0.25     Types: Cigarettes   • Smokeless tobacco: Never Used   • Tobacco comment: states is in the process of quitting   Substance and Sexual Activity   • Alcohol use: Not Currently   • Drug use: No   • Sexual activity: Yes     Partners: Male       ALLERGIES  Patient has no known allergies.    REVIEW OF SYSTEMS  Review of Systems   Constitutional: Negative for fever.   HENT: Negative for sore throat.    Respiratory: Negative for cough and shortness of breath.    Cardiovascular: Negative for chest pain.   Gastrointestinal: Positive for abdominal pain (suprapubic) and nausea. Negative for diarrhea and vomiting.   Genitourinary: Negative for dysuria.   Musculoskeletal: Negative for neck pain.   Skin: Negative for rash.   Neurological: Negative for weakness, numbness and headaches.   All other systems reviewed and are negative.      PHYSICAL EXAM  ED Triage Vitals [02/20/20 0140]   Temp Heart Rate Resp BP SpO2   97.3 °F (36.3 °C) 87 18 -- 100 %      Temp src Heart Rate Source Patient Position BP Location FiO2 (%)   -- -- -- -- --       Physical Exam   Constitutional: She is oriented to person, place, and time. No distress.   HENT:   Head: Normocephalic and atraumatic.   Eyes: Pupils are equal, round, and reactive to light. EOM are normal.   Neck: Normal range of motion. Neck supple.   Cardiovascular: Normal rate, regular rhythm and normal heart sounds.   Pulmonary/Chest: Effort normal and breath sounds normal. No respiratory distress.   Abdominal: Soft. Bowel sounds are normal. There is no tenderness. There is no rebound, no guarding and no  tenderness at McBurney's point.   Musculoskeletal: Normal range of motion. She exhibits no edema.   Neurological: She is alert and oriented to person, place, and time. She has normal sensation and normal strength.   Skin: Skin is warm and dry. No rash noted.   Psychiatric: Mood and affect normal.   Nursing note and vitals reviewed.      LAB RESULTS  Lab Results (last 24 hours)     Procedure Component Value Units Date/Time    CBC & Differential [[id number]] Collected:  02/19/20 2329    Specimen:  Blood Updated:  02/19/20 2342    Narrative:       The following orders were created for panel order CBC & Differential.  Procedure                               Abnormality         Status                     ---------                               -----------         ------                     CBC Auto Differential[[id number]]        Abnormal            Final result                 Please view results for these tests on the individual orders.    Comprehensive Metabolic Panel [[id number]]  (Abnormal) Collected:  02/19/20 2329    Specimen:  Blood Updated:  02/20/20 0008     Glucose 88 mg/dL      BUN 8 mg/dL      Creatinine 0.71 mg/dL      Sodium 138 mmol/L      Potassium 3.8 mmol/L      Chloride 102 mmol/L      CO2 26.5 mmol/L      Calcium 9.3 mg/dL      Total Protein 6.9 g/dL      Albumin 4.10 g/dL      ALT (SGPT) 12 U/L      AST (SGOT) 14 U/L      Alkaline Phosphatase 52 U/L      Total Bilirubin <0.2 mg/dL      eGFR Non African Amer 97 mL/min/1.73      Globulin 2.8 gm/dL      A/G Ratio 1.5 g/dL      BUN/Creatinine Ratio 11.3     Anion Gap 9.5 mmol/L     Narrative:       GFR Normal >60  Chronic Kidney Disease <60  Kidney Failure <15      Lipase [[id number]]  (Normal) Collected:  02/19/20 2329    Specimen:  Blood Updated:  02/20/20 0008     Lipase 22 U/L     hCG, Quantitative, Pregnancy [[id number]] Collected:  02/19/20 2329    Specimen:  Blood Updated:  02/20/20 0007     HCG Quantitative 2,510.00 mIU/mL     Narrative:       HCG  Ranges by Gestational Age    Females - non-pregnant premenopausal   </= 1mIU/mL HCG  Females - postmenopausal               </= 7mIU/mL HCG    3 Weeks         5.4 -      72 mIU/mL  4 Weeks        10.2 -     708 mIU/mL  5 Weeks       217   -   8,245 mIU/mL  6 Weeks       152   -  32,177 mIU/mL  7 Weeks     4,059   - 153,767 mIU/mL  8 Weeks    31,366   - 149,094 mIU/mL  9 Weeks    59,109   - 135,901 mIU/mL  10 Weeks   44,186   - 170,409 mIU/mL  12 Weeks   27,107   - 201,615 mIU/mL  14 Weeks   24,302   -  93,646 mIU/mL  15 Weeks   12,540   -  69,747 mIU/mL  16 Weeks    8,904   -  55,332 mIU/mL  17 Weeks    8,240   -  51,793 mIU/mL  18 Weeks    9,649   -  55,271 mIU/mL    Results may be falsely decreased if patient taking Biotin.      CBC Auto Differential [[id number]]  (Abnormal) Collected:  02/19/20 2329    Specimen:  Blood Updated:  02/19/20 2342     WBC 12.04 10*3/mm3      RBC 4.29 10*6/mm3      Hemoglobin 12.5 g/dL      Hematocrit 38.2 %      MCV 89.0 fL      MCH 29.1 pg      MCHC 32.7 g/dL      RDW 15.0 %      RDW-SD 48.6 fl      MPV 9.7 fL      Platelets 339 10*3/mm3      Neutrophil % 45.3 %      Lymphocyte % 41.4 %      Monocyte % 10.5 %      Eosinophil % 1.9 %      Basophil % 0.6 %      Immature Grans % 0.3 %      Neutrophils, Absolute 5.44 10*3/mm3      Lymphocytes, Absolute 4.99 10*3/mm3      Monocytes, Absolute 1.27 10*3/mm3      Eosinophils, Absolute 0.23 10*3/mm3      Basophils, Absolute 0.07 10*3/mm3      Immature Grans, Absolute 0.04 10*3/mm3      nRBC 0.0 /100 WBC     Urinalysis With Microscopic If Indicated (No Culture) - Urine, Clean Catch [[id number]]  (Abnormal) Collected:  02/19/20 2330    Specimen:  Urine, Clean Catch Updated:  02/19/20 2342     Color, UA Yellow     Appearance, UA Clear     pH, UA 5.5     Specific Gravity, UA <=1.005     Glucose, UA Negative     Ketones, UA Negative     Bilirubin, UA Negative     Blood, UA Small (1+)     Protein, UA Negative     Leuk Esterase, UA Negative      Nitrite, UA Negative     Urobilinogen, UA 0.2 E.U./dL    Urinalysis, Microscopic Only - Urine, Clean Catch [[id number]]  (Abnormal) Collected:  02/19/20 2330    Specimen:  Urine, Clean Catch Updated:  02/20/20 0008     RBC, UA 3-5 /HPF      WBC, UA 3-5 /HPF      Bacteria, UA Trace /HPF      Squamous Epithelial Cells, UA 0-2 /HPF      Hyaline Casts, UA None Seen /LPF      Methodology Manual Light Microscopy          I ordered the above labs and reviewed the results    RADIOLOGY  US Ob Limited 1 + Fetuses   Final Result   1. Nonspecific endometrial thickening, no IUP demonstrated. Obstetrical   follow-up recommended.   2. Benign-appearing cystic lesion adjacent to the left adnexa       This report was finalized on 2/20/2020 5:16 AM by Elliot Silveira M.D.          US Ob Transvaginal    (Results Pending)   US Testicular or Ovarian Vascular Limited    (Results Pending)        I ordered the above noted radiological studies. Interpreted by radiologist. Reviewed by me in PACS.     PROGRESS AND CONSULTS  ED Course as of Feb 20 0548   Thu Feb 20, 2020   0258 02/19/2020 (drawn 4 hours ago in [place])  HCG Quantitative mIU/mL 2,510.00   ABO Type O   RH type Positive               [RC]      ED Course User Index  [RC] Clay Coyle III, PA     0152 US OB Transvaginal ordered. US Limited Fetuses ordered. US Ovarian ordered.     0309 Reviewed pt's history and workup with Dr. Avendano.  At bedside evaluation, they agree with the plan of care.    0318 Discussed pt w/Dr. Rayo (OB) who recommends keeping the pt NPO and having her follow up in office later today.    0325 Patient is resting comfortably and in NAD. Patient is stable. BP- 128/77 HR- 62 Temp- 97.3 °F (36.3 °C) O2 sat- 97%. Informed the patient results of US which shows no gestational sack and labs with HCG of 2510. Discussed the plan for d/c with f/u to OB today. Pt understands and agrees with the plan, all questions answered.      MEDICAL DECISION  MAKING  Results were reviewed/discussed with the patient and they were also made aware of online access. Pt also made aware that some labs, such as cultures, will not be resulted during ER visit and follow up with PMD is necessary.       DIAGNOSIS  Final diagnoses:   Vaginal bleeding   Inappropriate level of quantitative human chorionicgonadotropin (hCG) for gestational age in early pregnancy       DISPOSITION  DISCHARGE    Patient discharged in stable condition.    Reviewed implications of results, diagnosis, meds, responsibility to follow up, warning signs and symptoms of possible worsening, potential complications and reasons to return to ER.    Patient/Family voiced understanding of above instructions.    Discussed plan for discharge, as there is no emergent indication for admission. Patient referred to primary care provider for BP management due to today's BP. Pt/family is agreeable and understands need for follow up and repeat testing.  Pt is aware that discharge does not mean that nothing is wrong but it indicates no emergency is present that requires admission and they must continue care with follow-up as given below or physician of their choice.     FOLLOW-UP  Imelda Rayo MD  [address] [address] [phone]    Schedule an appointment as soon as possible for a visit today  For further evaluation and treatment (Call at 7 am to set up morning appointment with Dr Imelda Rayo)         Medication List      No changes were made to your prescriptions during this visit.         Latest Documented Vital Signs:  As of 5:48 AM  BP- 115/96 HR- 63 Temp- 97.3 °F (36.3 °C) (Oral) O2 sat- 99%    --  Documentation assistance provided by cynthia Lancaster for Clay Coyle.  Information recorded by the cynthia was done at my direction and has been verified and validated by me.       Tonny Sr  02/20/20 0331       Clay Coyle III, PA  02/20/20 3351

## 2020-02-20 NOTE — ED NOTES
"Pt c/o RLQ ABD/groin pain since 1900 yesterday. States pain has improved since it's onset and \"it's not that bad right now.\"  had some nausea but no vomiting.  is approximately 7 wks and 4 days pregnant with her first pregnancy \"after trying for 8 wks.\"  was seen at [facility] this evening and had a pelvic exam with some bleeding afterwards but denies other vaginal bleeding.  came here because [facility] was unable to do an ultrasound. NAD, respirations even and unlabored.     Katerin Glover, RN  02/20/20 8406    "

## 2020-02-20 NOTE — DISCHARGE INSTRUCTIONS
Call Dr. Rayo's office at 8:30 in the morning to schedule an pelvic ultrasound tomorrow morning.  Return to the ER should you develop wade vaginal bleeding or increased pain.

## 2020-02-20 NOTE — ANESTHESIA POSTPROCEDURE EVALUATION
Patient: Es Branch    Procedure Summary     Date:  02/20/20 Room / Location:   [facility] OR 2 /  [facility]    Anesthesia Start:  1144 Anesthesia Stop:  1234    Procedure:  DIAGNOSTIC LAPAROSCOPY (N/A Abdomen) Diagnosis:       Ectopic pregnancy, unspecified location, unspecified whether intrauterine pregnancy present      (Ectopic pregnancy, unspecified location, unspecified whether intrauterine pregnancy present [O00.90])    Surgeon:  Imelda Rayo MD Provider:  Delfina Cunha MD    Anesthesia Type:  general ASA Status:  2          Anesthesia Type: general    Vitals  Vitals Value Taken Time   /58 2/20/2020  1:05 PM   Temp 97.4 °F (36.3 °C) 2/20/2020 12:34 PM   Pulse 60 2/20/2020  1:09 PM   Resp 20 2/20/2020  1:05 PM   SpO2 100 % 2/20/2020  1:10 PM   Vitals shown include unvalidated device data.        Post Anesthesia Care and Evaluation    Patient location during evaluation: PHASE II  Patient participation: complete - patient participated  Level of consciousness: awake and alert  Pain score: 2  Pain management: adequate  Airway patency: patent  Anesthetic complications: No anesthetic complications  PONV Status: none  Cardiovascular status: acceptable  Respiratory status: acceptable  Hydration status: acceptable

## 2020-02-20 NOTE — OP NOTE
Subjective     Date of Service:  02/20/20  Time of Service:  12:26 PM    Surgical Staff: Surgeon(s) and Role:     * Imelda Rayo MD - Primary   Additional Staff: Levy Brady CFA   Pre-operative diagnosis(es): Pre-Op Diagnosis Codes:     * Ectopic pregnancy, unspecified location, unspecified whether intrauterine pregnancy present [O00.90]     Post-operative diagnosis(es): Post-Op Diagnosis Codes:     Normal pelvis   Procedure(s): Procedure(s):  DIAGNOSTIC LAPAROSCOPY     Antibiotics: cefazolin (Ancef) ordered on call to OR     Anesthesia: Type: Choice  ASA:  II     Objective      Operative findings: Normal pelvis, no ectopic, no blood in pelvis   Specimens removed: None   Fluid Intake: 350mL   Output: Documented Output  Est. Blood Loss 1mL  Urine Output 200mL      No intake/output data recorded.     Blood products used: No   Drains: [REMOVED] NG/OG Tube Orogastric 18 Fr Center mouth (Removed)      Implant Information: Nothing was implanted during the procedure   Complications: None apparent   Intraoperative consult(s):    Condition: stable   Disposition: to PACU and then admit to  home         Assessment/Plan   Ms. Branch is a 29-year-old G1, P0 with an LMP consistent with a gestation of 7 weeks and 4 days.  She presented to the ER complaining of right lower quadrant pain.  Her quant was 2500 with no IUP.  She has been having some spotting but no heavy bleeding.  Her pain was so significant she went to two emergency rooms last night.  Her exam was concerning for an ectopic pregnancy.  After informed consent was obtained , she was taken to the operating room where general endotracheal anesthesia was administered without difficulty.  She was placed in yellowfin stirrups and prepped and draped in normal sterile fashion with a sponge stick in the vagina.  An infra umbilical skin incision was made and the Veress needle was inserted at a 45 degree angle.  The water drop test was positive.  Opening pressures  were low and insufflation was begun until there was tympany in all 4 quadrants.  A 5 mm clear view trocar was then placed under direct visualization direct entry into the abdomen was confirmed with the laparoscope.  A 5 mm left lower quadrant port were then placed under direct visualization.  The patient was then placed in Trendelenburg.  The liver edge and all peritoneal surfaces were normal.  All peritoneal surfaces were normal.  Survey of the pelvis revealed no evidence of ectopic pregnancy there was no free fluid or blood.  Both tubes appear to be normal.  The uterus did appear to be globular.  There was no evidence of abdominal ectopic pregnancy.    The procedure was deemed complete.  All instruments were removed under direct visualization and gas was allowed to escape the abdomen.  The incisions were closed with 4-0 Monocryl.  Sponge, lap, needle and instrument counts were all correct.  The patient was extubated without difficulty and taken to recovery room in good condition.  She will be discharged home when she meets recovery room criteria.  Postop instructions and restrictions were reviewed with the patient in detail and all of her questions were answered.  She is to follow-up in the office on Monday for repeat quantitative hCG.  We did discuss with the family that since she is having vaginal bleeding and has no ectopic, she may be in the process of having a miscarriage.  Since this is a desired pregnancy and we only have 1 quantitative hCG value, she will need continued follow-up in the office.  SAB warnings were given.      Imelda Rayo MD

## 2020-02-20 NOTE — ED NOTES
Pt presents with c/o intense lower abd cramping tonight. Pt is 7 weeks pregnant, has had mild intermittent cramping for 3 weeks but was told that was normal by her OBGYN. Tonight she started having intense low cramping that made her double over and she started to cry. She says the pain has never felt like this before. Denies any bleeding or spotting. Denies any n/v or urinary complaints.  at bedside. Pt is A&Ox4, ambulates with a steady gait.      Sandi Branch, RN  02/19/20 1192

## 2020-02-24 ENCOUNTER — OFFICE VISIT (OUTPATIENT)
Dept: OBSTETRICS AND GYNECOLOGY | Facility: CLINIC | Age: 30
End: 2020-02-24

## 2020-02-24 ENCOUNTER — APPOINTMENT (OUTPATIENT)
Dept: ULTRASOUND IMAGING | Facility: HOSPITAL | Age: 30
End: 2020-02-24

## 2020-02-24 ENCOUNTER — HOSPITAL ENCOUNTER (EMERGENCY)
Facility: HOSPITAL | Age: 30
Discharge: HOME | End: 2020-02-24
Attending: EMERGENCY MEDICINE | Admitting: EMERGENCY MEDICINE

## 2020-02-24 VITALS
TEMPERATURE: 98.6 F | SYSTOLIC BLOOD PRESSURE: 144 MMHG | HEART RATE: 89 BPM | BODY MASS INDEX: 31.25 KG/M2 | OXYGEN SATURATION: 98 % | WEIGHT: 211 LBS | RESPIRATION RATE: 16 BRPM | HEIGHT: 69 IN | DIASTOLIC BLOOD PRESSURE: 100 MMHG

## 2020-02-24 VITALS
WEIGHT: 211.7 LBS | HEIGHT: 69 IN | DIASTOLIC BLOOD PRESSURE: 72 MMHG | BODY MASS INDEX: 31.36 KG/M2 | SYSTOLIC BLOOD PRESSURE: 140 MMHG

## 2020-02-24 DIAGNOSIS — O03.9 SAB (SPONTANEOUS ABORTION): Primary | ICD-10-CM

## 2020-02-24 DIAGNOSIS — Z31.69 ENCOUNTER FOR PRECONCEPTION CONSULTATION: ICD-10-CM

## 2020-02-24 DIAGNOSIS — Z31.9 INFERTILITY MANAGEMENT: ICD-10-CM

## 2020-02-24 DIAGNOSIS — O03.9 SPONTANEOUS ABORTION IN FIRST TRIMESTER: Primary | ICD-10-CM

## 2020-02-24 DIAGNOSIS — Z98.890 S/P LAPAROSCOPY: ICD-10-CM

## 2020-02-24 PROBLEM — O00.90 ECTOPIC PREGNANCY: Status: RESOLVED | Noted: 2020-02-20 | Resolved: 2020-02-24

## 2020-02-24 LAB — HCG INTACT+B SERPL-ACNC: 107.1 MIU/ML

## 2020-02-24 PROCEDURE — 84702 CHORIONIC GONADOTROPIN TEST: CPT | Performed by: EMERGENCY MEDICINE

## 2020-02-24 PROCEDURE — 99284 EMERGENCY DEPT VISIT MOD MDM: CPT | Performed by: EMERGENCY MEDICINE

## 2020-02-24 PROCEDURE — 99024 POSTOP FOLLOW-UP VISIT: CPT | Performed by: OBSTETRICS & GYNECOLOGY

## 2020-02-24 PROCEDURE — 99283 EMERGENCY DEPT VISIT LOW MDM: CPT

## 2020-02-24 PROCEDURE — 76817 TRANSVAGINAL US OBSTETRIC: CPT

## 2020-02-24 RX ORDER — METOPROLOL SUCCINATE 100 MG/1
100 TABLET, EXTENDED RELEASE ORAL DAILY
COMMUNITY
Start: 2020-02-05 | End: 2020-04-29

## 2020-02-24 RX ORDER — PRENATAL 168/IRON/FOLIC/OMEGA3 27-800-235
1 CAPSULE ORAL DAILY
COMMUNITY
Start: 2020-02-05 | End: 2021-01-07

## 2020-02-24 NOTE — ED PROVIDER NOTES
Subjective   Es Branch is a 30 yo WF who presents secondary to left-sided pelvic pain and vaginal bleeding.  Patient is approximately 8 weeks pregnant.  She has had issues with pain and bleeding for the past 4 weeks.  She is undergone ultrasound which did not show any evidence of IUP or ectopic pregnancy.  She underwent laparoscopic exploratory on 2/20/2020 which did not find any evidence of ectopic pregnancy.  Patient was seen by Dr. Imelda Rayo earlier today.  Her office visit was unremarkable.  After patient returned home she had onset of left-sided pelvic pain with associated vaginal bleeding.  She called the office and was told to come to the emergency room.  Patient states she is unsure if she is pregnant or if there is an ectopic pregnancy.  She presents for evaluation.      History provided by:  Patient  Pelvic Pain   Location:  L  Severity:  Moderate  Duration:  2 hours  Chronicity:  New  Context:  As described above.  Associated vaginal bleeding.  Relieved by:  Nothing tried  Worsened by:  Nothing  Ineffective treatments:  Nothing tried  Associated symptoms: no abdominal pain, no chest pain, no cough, no fever and no rhinorrhea        Review of Systems   Constitutional: Negative.  Negative for fever.   HENT: Negative.  Negative for rhinorrhea.    Eyes: Negative.  Negative for redness.   Respiratory: Negative for cough.    Cardiovascular: Negative for chest pain.   Gastrointestinal: Negative for abdominal pain.   Endocrine: Negative.    Genitourinary: Positive for pelvic pain and vaginal bleeding. Negative for difficulty urinating.   Musculoskeletal: Negative.  Negative for back pain.   Skin: Negative.  Negative for color change.   Neurological: Negative.  Negative for syncope.   Hematological: Negative.    Psychiatric/Behavioral: Negative.    All other systems reviewed and are negative.      Past Medical History:   Diagnosis Date   • Asthma    • Headache    • Hypertension    • PCOS (polycystic  ovarian syndrome) 07/2013       No Known Allergies    Past Surgical History:   Procedure Laterality Date   • ADENOIDECTOMY     • DIAGNOSTIC LAPAROSCOPY N/A 2/20/2020    Procedure: DIAGNOSTIC LAPAROSCOPY;  Surgeon: Imelda Rayo MD;  Location: [facility];  Service: Obstetrics/Gynecology;  Laterality: N/A;   • TONSILLECTOMY  02/2008    adnoids also       Family History   Problem Relation Age of Onset   • Hypertension Mother    • Ovarian cancer Mother    • Hypertension Maternal Grandmother    • Diabetes Maternal Grandfather    • Hypertension Paternal Grandmother    • Diabetes Paternal Grandfather    • Deep vein thrombosis Maternal Aunt    • Breast cancer Neg Hx    • Colon cancer Neg Hx    • Pulmonary embolism Neg Hx    • Anesthesia problems Neg Hx        Social History     Socioeconomic History   • Marital status:      Spouse name: Not on file   • Number of children: Not on file   • Years of education: Not on file   • Highest education level: Not on file   Tobacco Use   • Smoking status: Current Every Day Smoker     Packs/day: 0.25     Types: Cigarettes   • Smokeless tobacco: Never Used   • Tobacco comment: states is in the process of quitting   Substance and Sexual Activity   • Alcohol use: Not Currently   • Drug use: No   • Sexual activity: Yes     Partners: Male     Birth control/protection: None           Objective   Physical Exam   Constitutional: She is oriented to person, place, and time. She appears well-developed and well-nourished. No distress.   29-year-old white female laying in bed.  Patient appears in good overall health.  Vital signs notable for BP of 144/100.  Otherwise unremarkable.   HENT:   Head: Normocephalic and atraumatic.   Right Ear: External ear normal.   Left Ear: External ear normal.   Nose: Nose normal.   Mouth/Throat: Oropharynx is clear and moist.   Eyes: Pupils are equal, round, and reactive to light. Conjunctivae and EOM are normal.   Neck: Normal range of motion. Neck  supple.   Cardiovascular: Normal rate, regular rhythm, normal heart sounds and intact distal pulses. Exam reveals no gallop and no friction rub.   No murmur heard.  Pulmonary/Chest: Effort normal and breath sounds normal.   Abdominal: Soft. Bowel sounds are normal. She exhibits no distension. There is no tenderness.   Musculoskeletal: Normal range of motion.   Neurological: She is alert and oriented to person, place, and time. She has normal reflexes.   Skin: Skin is warm and dry. She is not diaphoretic.   Psychiatric: She has a normal mood and affect. Her behavior is normal.   Nursing note and vitals reviewed.      Procedures           ED Course  ED Course as of 9   Mon 2020   1847 Patient had laparoscope being on 2020.  No evidence of ectopic pregnancy at that time.  Patient seen in OB/GYN's office earlier today.  However 4 PM she had onset of left-sided pelvic pain and bleeding.  She called the OB/GYN office and was told to come to the ER for evaluation.    [SS]    Ultrasound is unremarkable.  Quantitative beta-hCG is 107.  Prior beta-hCG was 2500.  This points toward a spontaneous .  Patient declined pelvic exam after we discussed ultrasound results and beta-hCG.  Discussed at length with patient and her spouse results, diagnoses and follow-up.  They acknowledge understanding.  Will DC home.    [SS]      ED Course User Index  [SS] Sam Hutton MD      Labs Reviewed   HCG, QUANTITATIVE, PREGNANCY    Narrative:     HCG Ranges by Gestational Age    Females - non-pregnant premenopausal   </= 1mIU/mL HCG  Females - postmenopausal               </= 7mIU/mL HCG    3 Weeks         5.4 -      72 mIU/mL  4 Weeks        10.2 -     708 mIU/mL  5 Weeks       217   -   8,245 mIU/mL  6 Weeks       152   -  32,177 mIU/mL  7 Weeks     4,059   - 153,767 mIU/mL  8 Weeks    31,366   - 149,094 mIU/mL  9 Weeks    59,109   - 135,901 mIU/mL  10 Weeks   44,186   - 170,409 mIU/mL  12 Weeks    27,107   - 201,615 mIU/mL  14 Weeks   24,302   -  93,646 mIU/mL  15 Weeks   12,540   -  69,747 mIU/mL  16 Weeks    8,904   -  55,332 mIU/mL  17 Weeks    8,240   -  51,793 mIU/mL  18 Weeks    9,649   -  55,271 mIU/mL    Results may be falsely decreased if patient taking Biotin.       Us Ob Limited 1 + Fetuses    Result Date: 2/20/2020  Narrative: PELVIC ULTRASOUND  CLINICAL HISTORY: Pregnant patient with bleeding.  COMPARISON: none.  FINDINGS: Longitudinal and transverse images of the pelvis were obtained in both transabdominal and transvaginal fashions. Uterus measures 9.5 x 5.8 x 5.7 cm in greatest dimensions. Endometrial thickening noted at 16 mm with mild heterogeneity but no evidence of an intrauterine gestational sac identified. Remainder of the uterus is unremarkable. Ovaries normal in size and appearance with normal color flow. Small 9 mm cystic structure is present adjacent to the left adnexa. It appears to reflect a cyst. It does not exhibit internal color flow nor is the adjacent parenchyma hypervascular.  Obstetrical follow-up is recommended. Ectopic pregnancy cannot be excluded.        Impression: 1. Nonspecific endometrial thickening, no IUP demonstrated. Obstetrical follow-up recommended. 2. Benign-appearing cystic lesion adjacent to the left adnexa  This report was finalized on 2/20/2020 5:16 AM by Elilot Silveira M.D.      Us Ob Transvaginal    Result Date: 2/24/2020  Narrative: US OB Transvaginal INDICATION:  Positive pregnancy test with bleeding for 6 days. TECHNIQUE: Transabdominal and endovaginal scanning of the pelvis COMPARISON:  3/30/2019 pelvic ultrasound FINDINGS: Normal-appearing nongravid uterus measuring 9.1 x 4.7 x 5.7 cm. No gestational sac identified. Thin echogenic endometrium. Double thickness measurement 3 mm. Both ovaries are visualized are unremarkable. Small simple left ovarian cyst measuring 1.1 cm. No free fluid or adnexal masses.     Impression: Pregnancy of undetermined  location. In the setting of a markedly reduced quantitative hCG this most likely represents a failed pregnancy and complete AB. Ectopic or early IUP unlikely. Signer Name: SADAF Sidhu MD  Signed: 2/24/2020 7:45 PM  Workstation Name: [id number]  [facility] Ob Transvaginal    Result Date: 2/20/2020  Narrative: PELVIC ULTRASOUND  CLINICAL HISTORY: Pregnant patient with bleeding.  COMPARISON: none.  FINDINGS: Longitudinal and transverse images of the pelvis were obtained in both transabdominal and transvaginal fashions. Uterus measures 9.5 x 5.8 x 5.7 cm in greatest dimensions. Endometrial thickening noted at 16 mm with mild heterogeneity but no evidence of an intrauterine gestational sac identified. Remainder of the uterus is unremarkable. Ovaries normal in size and appearance with normal color flow. Small 9 mm cystic structure is present adjacent to the left adnexa. It appears to reflect a cyst. It does not exhibit internal color flow nor is the adjacent parenchyma hypervascular.  Obstetrical follow-up is recommended. Ectopic pregnancy cannot be excluded.        Impression: 1. Nonspecific endometrial thickening, no IUP demonstrated. Obstetrical follow-up recommended. 2. Benign-appearing cystic lesion adjacent to the left adnexa  This report was finalized on 2/20/2020 5:16 AM by Elliot Silveira M.D.      Us Testicular Or Ovarian Vascular Limited    Result Date: 2/20/2020  Narrative: PELVIC ULTRASOUND  CLINICAL HISTORY: Pregnant patient with bleeding.  COMPARISON: none.  FINDINGS: Longitudinal and transverse images of the pelvis were obtained in both transabdominal and transvaginal fashions. Uterus measures 9.5 x 5.8 x 5.7 cm in greatest dimensions. Endometrial thickening noted at 16 mm with mild heterogeneity but no evidence of an intrauterine gestational sac identified. Remainder of the uterus is unremarkable. Ovaries normal in size and appearance with normal color flow. Small 9  mm cystic structure is present adjacent to the left adnexa. It appears to reflect a cyst. It does not exhibit internal color flow nor is the adjacent parenchyma hypervascular.  Obstetrical follow-up is recommended. Ectopic pregnancy cannot be excluded.        Impression: 1. Nonspecific endometrial thickening, no IUP demonstrated. Obstetrical follow-up recommended. 2. Benign-appearing cystic lesion adjacent to the left adnexa  This report was finalized on 2020 5:16 AM by Elliot Silveira M.D.      My differential diagnosis for abdominal pain includes but is not limited to:  Gastritis, gastroenteritis, peptic ulcer disease, GERD, esophageal perforation, acute appendicitis, mesenteric adenitis, Meckel’s diverticulum, epiploic appendagitis, diverticulitis, colon cancer, ulcerative colitis, Crohn’s disease, intussusception, small bowel obstruction, adhesions, ischemic bowel, perforated viscus, ileus, obstipation, biliary colic, cholecystitis, cholelithiasis, Karel-Isra Wilfrido, hepatitis, pancreatitis, common bile duct obstruction, cholangitis, bile leak, splenic trauma, splenic rupture, splenic infarction, splenic abscess, abdominal abscess, ascites, spontaneous bacterial peritonitis, hernia, UTI, cystitis, prostatitis, ureterolithiasis, urinary obstruction, ovarian cyst, torsion, pregnancy, ectopic pregnancy, PID, pelvic abscess, mittelschmerz, endometriosis, AAA, myocardial infarction, pneumonia, cancer, porphyria, DKA, medications, sickle cell, viral syndrome, zoster    My differential diagnosis for vaginal bleeding includes but is not limited to normal menstruation, menorrhagia, menorrhagia, metromenorrhagia, threatened miscarriage, incomplete miscarriage, imminent miscarriage, dysfunctional uterine bleeding, bleeding disorders, vaginal trauma, STDs and PID.                    MDM    Final diagnoses:   Spontaneous  in first trimester            Sam Hutton MD  20 [phone]

## 2020-02-24 NOTE — PROGRESS NOTES
Es Branch is a 29 y.o. patient who presents for follow up of   Chief Complaint   Patient presents with   • Follow-up     Ectopic Pregnacy       29-year-old established patient here on postop day# 3 from diagnostic laparoscopy for ectopic pregnancy.  Her diagnostic scope was normal she did not have an ectopic pregnancy.  She is continued to have heavy vaginal bleeding over the weekend.  This is slowed down today and she is now just spotting.  We discussed that she had a normal pelvis at laparoscopy.  We will continue to follow her pregnancy hormone level until it goes to 0, or until [facility].  We also had a long discussion regarding her longstanding infertility.  They are interested in pursuing some evaluation.  She also needs to be caught up on her routine health maintenance.  She did well postoperatively and is not having any significant pain.      The following portions of the patient's history were reviewed and updated as appropriate: allergies, current medications and problem list.    Review of Systems   Constitutional: Negative for appetite change, fatigue, fever and unexpected weight change.   Eyes: Negative for photophobia and visual disturbance.   Respiratory: Negative for cough and shortness of breath.    Cardiovascular: Negative for chest pain and palpitations.   Gastrointestinal: Negative for abdominal distention, abdominal pain, constipation, diarrhea and nausea.   Endocrine: Negative for cold intolerance and heat intolerance.   Genitourinary: Positive for vaginal bleeding. Negative for dyspareunia, dysuria, menstrual problem, pelvic pain and vaginal discharge.   Musculoskeletal: Negative for back pain.   Skin: Negative for color change and rash.   Neurological: Negative for headaches.   Hematological: Negative for adenopathy. Does not bruise/bleed easily.   Psychiatric/Behavioral: Positive for dysphoric mood.   All other systems reviewed and are  "negative.      /72   Ht 175.3 cm (69.02\")   Wt 96 kg (211 lb 11.2 oz)   LMP 2019 (Exact Date)   Breastfeeding No   BMI 31.25 kg/m²     Physical Exam   Constitutional: She is oriented to person, place, and time. She appears well-developed and well-nourished.   HENT:   Head: Normocephalic and atraumatic.   Eyes: Conjunctivae are normal. No scleral icterus.   Neck: Neck supple. No thyromegaly present.   Abdominal: Soft. Bowel sounds are normal. She exhibits no distension and no mass. There is no tenderness. There is no rebound and no guarding. No hernia.   Inc C/D/I   Neurological: She is alert and oriented to person, place, and time.   Skin: Skin is warm and dry.   Psychiatric: She has a normal mood and affect. Her behavior is normal. Judgment and thought content normal.   Nursing note and vitals reviewed.      A/P:  1. S/P dx lsc- no E/O ectopic pregnancy on dx lsc- doing well postop.   2. Probable SAB- check Quant hCG and follow to zero.   3. Infertility x 8 years- enc pt and partner to consider SA, orders given.  We discussed that male factor infertility is the first step in the work-up as it identifies 40% of issues within a couple, and we do not treat male factor in this office.  If the semen analysis is abnormal, we will need further help from [facility]/ urology..  We also discussed that her pelvis looks normal at laparoscopy.  They declined [facility] referral at this time.  4. PCOS  5. Preconceptual counseling- enc pt to stop smoking and enc PNV. Check Rubella and varicella IgG. O+  R[facility]- RTO 2 weeks annual and quant HcG    Assessment/Plan   Es was seen today for follow-up.    Diagnoses and all orders for this visit:    SAB (spontaneous )  -     Rubella Antibody, IgG  -     Varicella Zoster Antibody, IgG  -     HCG, B-subunit, Quantitative    S/P laparoscopy    Infertility management                 No follow-ups on file.      Imelda Rayo MD    2020  2:06 PM  "

## 2020-02-25 LAB
HCG INTACT+B SERPL-ACNC: 119.4 MIU/ML
RUBV IGG SERPL IA-ACNC: <0.9 INDEX
VZV IGG SER IA-ACNC: 370 INDEX

## 2020-02-25 NOTE — DISCHARGE INSTRUCTIONS
Continue current medications.  Follow-up with OB/GYN as above.  Return to ED for severe pain, heavy bleeding, development of fever, other signs of infection, medical emergencies.

## 2020-02-26 NOTE — PROGRESS NOTES
"PIP= her pregnancy hormone level has fallen to 119, indicating a miscarriage. We will test her hormone level again at her annual exam in a few weeks. She is not immune to rubella, which is the \"R\" part of the MMR vaccine. She needs to get the MMR vaccine and then wait at least one month before she tried to get pregnant."

## 2020-04-21 ENCOUNTER — TELEPHONE (OUTPATIENT)
Dept: OBSTETRICS AND GYNECOLOGY | Facility: CLINIC | Age: 30
End: 2020-04-21

## 2020-04-21 NOTE — TELEPHONE ENCOUNTER
Pt called to make confirmation appt, states she miscarried in March.  Apparently you had mentioned about progesterone possibly for future.  I have her scheduled June 8, which puts her at 8 weeks, but didn't know if you wanted her in sooner due to the fact.  Thanks.

## 2020-04-29 ENCOUNTER — INITIAL PRENATAL (OUTPATIENT)
Dept: OBSTETRICS AND GYNECOLOGY | Facility: CLINIC | Age: 30
End: 2020-04-29

## 2020-04-29 VITALS — DIASTOLIC BLOOD PRESSURE: 80 MMHG | BODY MASS INDEX: 30.99 KG/M2 | SYSTOLIC BLOOD PRESSURE: 126 MMHG | WEIGHT: 210 LBS

## 2020-04-29 DIAGNOSIS — N92.6 MISSED MENSES: Primary | ICD-10-CM

## 2020-04-29 DIAGNOSIS — K21.9 GASTROESOPHAGEAL REFLUX DISEASE WITHOUT ESOPHAGITIS: ICD-10-CM

## 2020-04-29 DIAGNOSIS — Z36.9 ENCOUNTER FOR ANTENATAL SCREENING, UNSPECIFIED: ICD-10-CM

## 2020-04-29 DIAGNOSIS — Z13.9 SCREENING FOR CONDITION: ICD-10-CM

## 2020-04-29 LAB
B-HCG UR QL: POSITIVE
BILIRUB BLD-MCNC: NEGATIVE MG/DL
CLARITY, POC: CLEAR
COLOR UR: YELLOW
GLUCOSE UR STRIP-MCNC: NEGATIVE MG/DL
HCG INTACT+B SERPL-ACNC: NORMAL MIU/ML
INTERNAL NEGATIVE CONTROL: NEGATIVE
INTERNAL POSITIVE CONTROL: POSITIVE
KETONES UR QL: NEGATIVE
LEUKOCYTE EST, POC: NEGATIVE
Lab: 55
NITRITE UR-MCNC: NEGATIVE MG/ML
PH UR: 5 [PH] (ref 5–8)
PROT UR STRIP-MCNC: NEGATIVE MG/DL
RBC # UR STRIP: NEGATIVE /UL
SP GR UR: 1 (ref 1–1.03)
UROBILINOGEN UR QL: NORMAL

## 2020-04-29 PROCEDURE — 81002 URINALYSIS NONAUTO W/O SCOPE: CPT | Performed by: OBSTETRICS & GYNECOLOGY

## 2020-04-29 PROCEDURE — 99214 OFFICE O/P EST MOD 30 MIN: CPT | Performed by: OBSTETRICS & GYNECOLOGY

## 2020-04-29 PROCEDURE — 81025 URINE PREGNANCY TEST: CPT | Performed by: OBSTETRICS & GYNECOLOGY

## 2020-04-29 RX ORDER — PANTOPRAZOLE SODIUM 40 MG/1
40 TABLET, DELAYED RELEASE ORAL DAILY
Qty: 30 TABLET | Refills: 6 | Status: SHIPPED | OUTPATIENT
Start: 2020-04-29 | End: 2021-01-07

## 2020-04-29 NOTE — PROGRESS NOTES
Patient Care Team:  Rosetta Adams MD as PCP - General (Family Medicine)    Chief complaint missed menses       HPI: This is a 30-year-old  2 para 0-0-1-0.  She recently had an SAB in 2020.  She had bleeding episodes but her quantitative hCG were never followed down to 0.  Patient is here because she thinks she is newly pregnant.  She has GERD that is severe daily.  It makes her gag and throw up.  It occurs throughout the day.  She is failed Tums and Pepcid at 20 mg over-the-counter.  This is a new patient to me with a new problem to me.    ROS:   Constitutional: Negative for fever and unexpected weight change.   Respiratory: Negative for cough and shortness of breath.    Cardiovascular: Negative for chest pain and palpitations.   Gastrointestinal: Negative for abdominal distention, abdominal pain, constipation, diarrhea, + nausea and vomiting.   Endocrine: Negative.    Genitourinary: Negative for dyspareunia, pelvic pain and vaginal discharge.   Skin: Negative.    Neurological: Negative for dizziness and headaches.   Hematological: Negative.    Psychiatric/Behavioral: Negative for dysphoric mood and sleep disturbance. The patient is not nervous/anxious.        PMH:   Past Medical History:   Diagnosis Date   • Asthma    • Headache    • Hypertension    • PCOS (polycystic ovarian syndrome) 2013         PSH:   Past Surgical History:   Procedure Laterality Date   • ADENOIDECTOMY     • DIAGNOSTIC LAPAROSCOPY N/A 2020    Procedure: DIAGNOSTIC LAPAROSCOPY;  Surgeon: Imelda Rayo MD;  Location: [facility];  Service: Obstetrics/Gynecology;  Laterality: N/A;   • TONSILLECTOMY  2008    adnoids also       SoHx:   Social History     Socioeconomic History   • Marital status:      Spouse name: Not on file   • Number of children: Not on file   • Years of education: Not on file   • Highest education level: Not on file   Tobacco Use   • Smoking status: Current Every Day Smoker      Packs/day: 0.25     Types: Cigarettes   • Smokeless tobacco: Never Used   • Tobacco comment: states is in the process of quitting   Substance and Sexual Activity   • Alcohol use: Not Currently   • Drug use: No   • Sexual activity: Yes     Partners: Male     Birth control/protection: None       FHx:   Family History   Problem Relation Age of Onset   • Hypertension Mother    • Ovarian cancer Mother    • Hypertension Maternal Grandmother    • Diabetes Maternal Grandfather    • Hypertension Paternal Grandmother    • Diabetes Paternal Grandfather    • Deep vein thrombosis Maternal Aunt    • Breast cancer Neg Hx    • Colon cancer Neg Hx    • Pulmonary embolism Neg Hx    • Anesthesia problems Neg Hx        PGyn Hx: Deferred    POBHx: SAB in February 2020.  Had expectant management.  Last quantitative hCG was 107.  She did not follow-up with us to get down to 0.    Allergies: Patient has no known allergies.    Medications:   Current Outpatient Medications on File Prior to Visit   Medication Sig Dispense Refill   • Prenat-Fe Carbonyl-FA-Omega 3 (ONE-A-DAY WOMENS PRENATAL 1) 28-0.8-235 MG capsule Take 1 capsule by mouth Daily.     • Prenatal Vit-Fe Fumarate-FA (PRENATAL, CLASSIC, VITAMIN) 28-0.8 MG tablet tablet Take  by mouth Daily.     • [DISCONTINUED] ibuprofen (ADVIL,MOTRIN) 600 MG tablet Take 1 tablet by mouth Every 6 (Six) Hours As Needed for Mild Pain . 30 tablet 0   • [DISCONTINUED] metoprolol succinate XL (TOPROL-XL) 100 MG 24 hr tablet Take 100 mg by mouth Daily.     • [DISCONTINUED] metoprolol tartrate (LOPRESSOR) 100 MG tablet Take 100 mg by mouth Daily.       No current facility-administered medications on file prior to visit.              Vital Signs  BP: (126)/(80) 126/80    Physical Exam:  Constitutional: She is oriented to person, place, and time. She appears well-developed and well-nourished.   HENT:   Head: Normocephalic and atraumatic.   Cardiovascular: Normal rate and regular rhythm.    Pulmonary/Chest:  Effort normal. No respiratory distress.   Abdominal: Soft. Bowel sounds are normal. She exhibits no distension and no mass. There is no tenderness. There is no rebound and no guarding.   Musculoskeletal: Normal range of motion.   Neurological: She is alert and oriented to person, place, and time.   Skin: Skin is warm and dry.   Psychiatric: She has a normal mood and affect. Her behavior is normal. Judgment and thought content normal.   Nursing note and vitals reviewed.  Debilities/Disabilities Identified: None  Emotional Behavior: Appropriate    Labs: Quantitative hCG drawn, urine pregnancy test positive      Assessment/Plan: Missed menses with positive urine pregnancy test, recent SAB    This is either a new pregnancy or possible retained products from her last SAB in February 2020.  Once the lab values are back ultrasound will be ordered.  We can follow quantitative hCGs if this is a new pregnancy.  She is O+ blood type.  Start Protonix daily.  She can continue her oral Pepcid and Tums.  This is a new problem to me requiring further work-up.    I discussed the patients findings and my recommendations with patient.     Murphy Wood MD  04/29/20  09:32

## 2020-05-04 ENCOUNTER — PROCEDURE VISIT (OUTPATIENT)
Dept: OBSTETRICS AND GYNECOLOGY | Facility: CLINIC | Age: 30
End: 2020-05-04

## 2020-05-04 ENCOUNTER — INITIAL PRENATAL (OUTPATIENT)
Dept: OBSTETRICS AND GYNECOLOGY | Facility: CLINIC | Age: 30
End: 2020-05-04

## 2020-05-04 VITALS — DIASTOLIC BLOOD PRESSURE: 80 MMHG | WEIGHT: 214.4 LBS | SYSTOLIC BLOOD PRESSURE: 120 MMHG | BODY MASS INDEX: 31.64 KG/M2

## 2020-05-04 DIAGNOSIS — Z36.9 ENCOUNTER FOR ANTENATAL SCREENING, UNSPECIFIED: ICD-10-CM

## 2020-05-04 DIAGNOSIS — Z36.89 ESTABLISH GESTATIONAL AGE, ULTRASOUND: Primary | ICD-10-CM

## 2020-05-04 DIAGNOSIS — N92.6 MISSED MENSES: Primary | ICD-10-CM

## 2020-05-04 LAB
GLUCOSE UR STRIP-MCNC: NEGATIVE MG/DL
PROT UR STRIP-MCNC: NEGATIVE MG/DL

## 2020-05-04 PROCEDURE — 0501F PRENATAL FLOW SHEET: CPT | Performed by: OBSTETRICS & GYNECOLOGY

## 2020-05-04 PROCEDURE — 76817 TRANSVAGINAL US OBSTETRIC: CPT | Performed by: OBSTETRICS & GYNECOLOGY

## 2020-05-04 RX ORDER — METOPROLOL SUCCINATE 100 MG/1
TABLET, EXTENDED RELEASE ORAL
COMMUNITY
Start: 2020-05-04 | End: 2020-05-04

## 2020-05-04 NOTE — PROGRESS NOTES
Patient Care Team:  Rosetta Adams MD as PCP - General (Family Medicine)    Chief complaint missed menses       HPI: This is a 30-year-old  2 para 0-0-1-0 that recently had an SAB.  The last quantitative hCG we saw was 107 and she did not follow-up for us to track her levels down to 0.  She did not have a cycle and is now back with a positive pregnancy test.  Quantitative hCG last week was 2299.  She is O+ blood type.  She presents today for ultrasound.  She has had no bleeding.  She has no nausea or vomiting.    ROS:   Constitutional: Negative for appetite change, fever and unexpected weight change.   Respiratory: Negative for cough and shortness of breath.    Cardiovascular: Negative for chest pain and palpitations.   Gastrointestinal: Negative for abdominal distention, abdominal pain, constipation, diarrhea, nausea and vomiting.   Endocrine: Negative.    Genitourinary: Negative for dyspareunia, menstrual problem, pelvic pain and vaginal discharge.   Skin: Negative.    Neurological: Negative for dizziness and headaches.   Hematological: Negative.    Psychiatric/Behavioral: Negative for dysphoric mood and sleep disturbance. The patient is not nervous/anxious.        PMH:   Past Medical History:   Diagnosis Date   • Asthma    • Headache    • Hypertension    • PCOS (polycystic ovarian syndrome) 2013         PSH:   Past Surgical History:   Procedure Laterality Date   • ADENOIDECTOMY     • DIAGNOSTIC LAPAROSCOPY N/A 2020    Procedure: DIAGNOSTIC LAPAROSCOPY;  Surgeon: Imelda Rayo MD;  Location: [facility];  Service: Obstetrics/Gynecology;  Laterality: N/A;   • TONSILLECTOMY  2008    adnoids also       SoHx:   Social History     Socioeconomic History   • Marital status:      Spouse name: Not on file   • Number of children: Not on file   • Years of education: Not on file   • Highest education level: Not on file   Tobacco Use   • Smoking status: Current Every Day Smoker      Packs/day: 0.25     Types: Cigarettes   • Smokeless tobacco: Never Used   • Tobacco comment: states is in the process of quitting   Substance and Sexual Activity   • Alcohol use: Not Currently   • Drug use: No   • Sexual activity: Yes     Partners: Male     Birth control/protection: None       FHx:   Family History   Problem Relation Age of Onset   • Hypertension Mother    • Ovarian cancer Mother    • Hypertension Maternal Grandmother    • Diabetes Maternal Grandfather    • Hypertension Paternal Grandmother    • Diabetes Paternal Grandfather    • Deep vein thrombosis Maternal Aunt    • Breast cancer Neg Hx    • Colon cancer Neg Hx    • Pulmonary embolism Neg Hx    • Anesthesia problems Neg Hx        PGyn Hx: deferred    POBHx: as above    Allergies: Patient has no known allergies.    Medications:   Current Outpatient Medications on File Prior to Visit   Medication Sig Dispense Refill   • pantoprazole (PROTONIX) 40 MG EC tablet Take 1 tablet by mouth Daily. 30 tablet 6   • Prenat-Fe Carbonyl-FA-Omega 3 (ONE-A-DAY WOMENS PRENATAL 1) 28-0.8-235 MG capsule Take 1 capsule by mouth Daily.     • Prenatal Vit-Fe Fumarate-FA (PRENATAL, CLASSIC, VITAMIN) 28-0.8 MG tablet tablet Take  by mouth Daily.     • [DISCONTINUED] metoprolol succinate XL (TOPROL-XL) 100 MG 24 hr tablet        No current facility-administered medications on file prior to visit.              Vital Signs  BP: (120)/(80) 120/80    Physical Exam:  Constitutional: She is oriented to person, place, and time. She appears well-developed and well-nourished.   HENT:   Head: Normocephalic and atraumatic.   Cardiovascular: Normal rate and regular rhythm.    Pulmonary/Chest: Effort normal. No respiratory distress.   Abdominal: Soft. Bowel sounds are normal. She exhibits no distension and no mass. There is no tenderness. There is no rebound and no guarding.   Musculoskeletal: Normal range of motion.   Neurological: She is alert and oriented to person, place, and time.    Skin: Skin is warm and dry.   Psychiatric: She has a normal mood and affect. Her behavior is normal. Judgment and thought content normal.   Nursing note and vitals reviewed.  Debilities/Disabilities Identified: None  Emotional Behavior: Appropriate    Labs: Quant was 2299, ultrasound shows a gestational sac with a yolk sac.  No fetal pole is seen.  Right ovary has a simple cyst measuring 1.8 x 1.6 cm.      Assessment/Plan: Possible early IUP versus retained products of conception.  Repeat quantitative hCG today and rescan in 1 week.  Bleeding warnings given.    I discussed the patients findings and my recommendations with patient.     Murphy Wood MD  05/04/20  14:56

## 2020-05-05 LAB
ABO GROUP BLD: ABNORMAL
BACTERIA UR CULT: NORMAL
BACTERIA UR CULT: NORMAL
BASOPHILS # BLD AUTO: 0 X10E3/UL (ref 0–0.2)
BASOPHILS NFR BLD AUTO: 0 %
BLD GP AB SCN SERPL QL: NEGATIVE
EOSINOPHIL # BLD AUTO: 0.2 X10E3/UL (ref 0–0.4)
EOSINOPHIL NFR BLD AUTO: 2 %
ERYTHROCYTE [DISTWIDTH] IN BLOOD BY AUTOMATED COUNT: 13.9 % (ref 11.7–15.4)
HBA1C MFR BLD: 5.77 % (ref 4.8–5.6)
HBV SURFACE AG SERPL QL IA: NEGATIVE
HCT VFR BLD AUTO: 39 % (ref 34–46.6)
HCV AB S/CO SERPL IA: <0.1 S/CO RATIO (ref 0–0.9)
HGB BLD-MCNC: 13 G/DL (ref 11.1–15.9)
HIV 1+2 AB+HIV1 P24 AG SERPL QL IA: NON REACTIVE
IMM GRANULOCYTES # BLD AUTO: 0.1 X10E3/UL (ref 0–0.1)
IMM GRANULOCYTES NFR BLD AUTO: 1 %
LYMPHOCYTES # BLD AUTO: 3.6 X10E3/UL (ref 0.7–3.1)
LYMPHOCYTES NFR BLD AUTO: 29 %
MCH RBC QN AUTO: 29.6 PG (ref 26.6–33)
MCHC RBC AUTO-ENTMCNC: 33.3 G/DL (ref 31.5–35.7)
MCV RBC AUTO: 89 FL (ref 79–97)
MONOCYTES # BLD AUTO: 1.1 X10E3/UL (ref 0.1–0.9)
MONOCYTES NFR BLD AUTO: 8 %
NEUTROPHILS # BLD AUTO: 7.7 X10E3/UL (ref 1.4–7)
NEUTROPHILS NFR BLD AUTO: 60 %
PLATELET # BLD AUTO: 375 X10E3/UL (ref 150–450)
RBC # BLD AUTO: 4.39 X10E6/UL (ref 3.77–5.28)
RH BLD: POSITIVE
RPR SER QL: NON REACTIVE
RUBV IGG SERPL IA-ACNC: <0.9 INDEX
WBC # BLD AUTO: 12.7 X10E3/UL (ref 3.4–10.8)

## 2020-05-07 LAB
HCG INTACT+B SERPL-ACNC: NORMAL MIU/ML
WRITTEN AUTHORIZATION: NORMAL

## 2020-05-12 ENCOUNTER — INITIAL PRENATAL (OUTPATIENT)
Dept: OBSTETRICS AND GYNECOLOGY | Facility: CLINIC | Age: 30
End: 2020-05-12

## 2020-05-12 ENCOUNTER — PROCEDURE VISIT (OUTPATIENT)
Dept: OBSTETRICS AND GYNECOLOGY | Facility: CLINIC | Age: 30
End: 2020-05-12

## 2020-05-12 VITALS — BODY MASS INDEX: 31.58 KG/M2 | WEIGHT: 214 LBS | SYSTOLIC BLOOD PRESSURE: 124 MMHG | DIASTOLIC BLOOD PRESSURE: 80 MMHG

## 2020-05-12 DIAGNOSIS — O36.80X0 ENCOUNTER TO DETERMINE FETAL VIABILITY OF PREGNANCY, SINGLE OR UNSPECIFIED FETUS: Primary | ICD-10-CM

## 2020-05-12 DIAGNOSIS — Z34.91 NORMAL PREGNANCY IN FIRST TRIMESTER: Primary | ICD-10-CM

## 2020-05-12 DIAGNOSIS — Z01.419 PAP SMEAR, LOW-RISK: ICD-10-CM

## 2020-05-12 DIAGNOSIS — Z13.9 SCREENING FOR CONDITION: ICD-10-CM

## 2020-05-12 DIAGNOSIS — Z36.9 ENCOUNTER FOR ANTENATAL SCREENING, UNSPECIFIED: ICD-10-CM

## 2020-05-12 DIAGNOSIS — Z86.39 HISTORY OF HYPOTHYROIDISM: ICD-10-CM

## 2020-05-12 LAB
GLUCOSE UR STRIP-MCNC: NEGATIVE MG/DL
PROT UR STRIP-MCNC: NEGATIVE MG/DL

## 2020-05-12 PROCEDURE — 0502F SUBSEQUENT PRENATAL CARE: CPT | Performed by: OBSTETRICS & GYNECOLOGY

## 2020-05-12 PROCEDURE — 76817 TRANSVAGINAL US OBSTETRIC: CPT | Performed by: OBSTETRICS & GYNECOLOGY

## 2020-05-12 NOTE — PROGRESS NOTES
OB follow up     Es Branch is a 30 y.o.  8w0d being seen today for her obstetrical visit.  Patient reports no bleeding, no contractions and no leaking. Fetal movement: absent.  Patient has been being seen weekly due to the fact that she had a recent miscarriage and she did not present for follow-up to see if her quantitative hCG dropped to 0.  She presented recently thinking she had a new pregnancy but we could not be so sure.  hCG levels have been rising and she presents today for follow-up ultrasound.  Last ultrasound last week showed a gestational sac with no fetal pole and no cardiac activity.  Patient has had no vaginal bleeding.    Review of Systems  No bleeding, No cramping/contractions     /80   Wt 97.1 kg (214 lb)   LMP 2020 (Exact Date)   BMI 31.58 kg/m²     FHT: present BPM   Uterine Size: 8 cm   Prenatal labs drawn last visit reviewed with the patient and are essentially normal.  She is rubella nonimmune and will require RhoGam postpartum.    Ultrasound was reviewed with the patient and compared to the ultrasound dated 2020.  This ultrasound shows a gestational sac with a fetal pole and a yolk sac with good cardiac activity.  Heart rate is 126 beats a minute.  Both ovaries appear normal.  There is no free fluid.  There is a small corpus luteum cyst on the right ovary.    Assessment/Plan:    1) 30 y.o.  -pregnancy at 8w0d    2)   Encounter Diagnoses   Name Primary?   • Normal pregnancy in first trimester Yes   • Encounter for  screening, unspecified    • Screening for condition    • Pap smear, low-risk    • History of hypothyroidism    Recently I noticed patient had an elevated TSH in 2019.  I would like to repeat that today and offer supplemental thyroid hormone if indicated.  Pap done today.  Previous H&P has already been performed.    3) Reviewed this stage of pregnancy  4) Problem list updated     Return in about 4 weeks (around 2020) for OB  Angle.      Murphy Wood MD    5/12/2020  10:14

## 2020-05-13 LAB
C TRACH RRNA CVX QL NAA+PROBE: NEGATIVE
CONV .: NORMAL
CYTOLOGIST CVX/VAG CYTO: NORMAL
CYTOLOGY CVX/VAG DOC CYTO: NORMAL
CYTOLOGY CVX/VAG DOC THIN PREP: NORMAL
DX ICD CODE: NORMAL
HIV 1 & 2 AB SER-IMP: NORMAL
N GONORRHOEA RRNA CVX QL NAA+PROBE: NEGATIVE
OTHER STN SPEC: NORMAL
STAT OF ADQ CVX/VAG CYTO-IMP: NORMAL
T VAGINALIS RRNA SPEC QL NAA+PROBE: NEGATIVE
TSH SERPL DL<=0.005 MIU/L-ACNC: 2.75 UIU/ML (ref 0.27–4.2)

## 2020-05-21 ENCOUNTER — HOSPITAL ENCOUNTER (EMERGENCY)
Facility: HOSPITAL | Age: 30
Discharge: HOME | End: 2020-05-21
Attending: EMERGENCY MEDICINE | Admitting: EMERGENCY MEDICINE

## 2020-05-21 VITALS
DIASTOLIC BLOOD PRESSURE: 83 MMHG | TEMPERATURE: 97.1 F | HEART RATE: 80 BPM | WEIGHT: 214 LBS | OXYGEN SATURATION: 99 % | HEIGHT: 69 IN | BODY MASS INDEX: 31.7 KG/M2 | RESPIRATION RATE: 16 BRPM | SYSTOLIC BLOOD PRESSURE: 129 MMHG

## 2020-05-21 DIAGNOSIS — Z3A.08 8 WEEKS GESTATION OF PREGNANCY: ICD-10-CM

## 2020-05-21 DIAGNOSIS — R10.32 LEFT LOWER QUADRANT ABDOMINAL PAIN: Primary | ICD-10-CM

## 2020-05-21 LAB
BASOPHILS # BLD AUTO: 0.06 10*3/MM3 (ref 0–0.2)
BASOPHILS NFR BLD AUTO: 0.4 % (ref 0–1.5)
BILIRUB UR QL STRIP: NEGATIVE
CLARITY UR: CLEAR
COLOR UR: YELLOW
DEPRECATED RDW RBC AUTO: 47.2 FL (ref 37–54)
EOSINOPHIL # BLD AUTO: 0.23 10*3/MM3 (ref 0–0.4)
EOSINOPHIL NFR BLD AUTO: 1.6 % (ref 0.3–6.2)
ERYTHROCYTE [DISTWIDTH] IN BLOOD BY AUTOMATED COUNT: 14.4 % (ref 12.3–15.4)
GLUCOSE UR STRIP-MCNC: NEGATIVE MG/DL
HCG INTACT+B SERPL-ACNC: NORMAL MIU/ML
HCT VFR BLD AUTO: 34.7 % (ref 34–46.6)
HGB BLD-MCNC: 11.3 G/DL (ref 12–15.9)
HGB UR QL STRIP.AUTO: NEGATIVE
IMM GRANULOCYTES # BLD AUTO: 0.07 10*3/MM3 (ref 0–0.05)
IMM GRANULOCYTES NFR BLD AUTO: 0.5 % (ref 0–0.5)
KETONES UR QL STRIP: NEGATIVE
KOH PREP NAIL: NORMAL
LEUKOCYTE ESTERASE UR QL STRIP.AUTO: NEGATIVE
LYMPHOCYTES # BLD AUTO: 4.43 10*3/MM3 (ref 0.7–3.1)
LYMPHOCYTES NFR BLD AUTO: 30.7 % (ref 19.6–45.3)
MCH RBC QN AUTO: 29.4 PG (ref 26.6–33)
MCHC RBC AUTO-ENTMCNC: 32.6 G/DL (ref 31.5–35.7)
MCV RBC AUTO: 90.4 FL (ref 79–97)
MONOCYTES # BLD AUTO: 1.24 10*3/MM3 (ref 0.1–0.9)
MONOCYTES NFR BLD AUTO: 8.6 % (ref 5–12)
NEUTROPHILS # BLD AUTO: 8.41 10*3/MM3 (ref 1.7–7)
NEUTROPHILS NFR BLD AUTO: 58.2 % (ref 42.7–76)
NITRITE UR QL STRIP: NEGATIVE
NRBC BLD AUTO-RTO: 0 /100 WBC (ref 0–0.2)
PH UR STRIP.AUTO: 5.5 [PH] (ref 4.5–8)
PLATELET # BLD AUTO: 334 10*3/MM3 (ref 140–450)
PMV BLD AUTO: 9.6 FL (ref 6–12)
PROT UR QL STRIP: NEGATIVE
RBC # BLD AUTO: 3.84 10*6/MM3 (ref 3.77–5.28)
SP GR UR STRIP: 1.02 (ref 1–1.03)
UROBILINOGEN UR QL STRIP: NORMAL
WBC NRBC COR # BLD: 14.44 10*3/MM3 (ref 3.4–10.8)

## 2020-05-21 PROCEDURE — 85025 COMPLETE CBC W/AUTO DIFF WBC: CPT | Performed by: EMERGENCY MEDICINE

## 2020-05-21 PROCEDURE — 84702 CHORIONIC GONADOTROPIN TEST: CPT | Performed by: EMERGENCY MEDICINE

## 2020-05-21 PROCEDURE — 81003 URINALYSIS AUTO W/O SCOPE: CPT | Performed by: EMERGENCY MEDICINE

## 2020-05-21 PROCEDURE — 87491 CHLMYD TRACH DNA AMP PROBE: CPT | Performed by: EMERGENCY MEDICINE

## 2020-05-21 PROCEDURE — 99283 EMERGENCY DEPT VISIT LOW MDM: CPT

## 2020-05-21 PROCEDURE — 87220 TISSUE EXAM FOR FUNGI: CPT | Performed by: EMERGENCY MEDICINE

## 2020-05-21 PROCEDURE — 87591 N.GONORRHOEAE DNA AMP PROB: CPT | Performed by: EMERGENCY MEDICINE

## 2020-05-21 PROCEDURE — 99282 EMERGENCY DEPT VISIT SF MDM: CPT | Performed by: EMERGENCY MEDICINE

## 2020-05-22 ENCOUNTER — ROUTINE PRENATAL (OUTPATIENT)
Dept: OBSTETRICS AND GYNECOLOGY | Facility: CLINIC | Age: 30
End: 2020-05-22

## 2020-05-22 ENCOUNTER — PROCEDURE VISIT (OUTPATIENT)
Dept: OBSTETRICS AND GYNECOLOGY | Facility: CLINIC | Age: 30
End: 2020-05-22

## 2020-05-22 VITALS — WEIGHT: 214 LBS | BODY MASS INDEX: 31.6 KG/M2 | DIASTOLIC BLOOD PRESSURE: 80 MMHG | SYSTOLIC BLOOD PRESSURE: 120 MMHG

## 2020-05-22 DIAGNOSIS — Z3A.08 8 WEEKS GESTATION OF PREGNANCY: Primary | ICD-10-CM

## 2020-05-22 DIAGNOSIS — O26.899 PELVIC PAIN IN PREGNANCY: Primary | ICD-10-CM

## 2020-05-22 DIAGNOSIS — R10.2 PELVIC PAIN IN PREGNANCY: Primary | ICD-10-CM

## 2020-05-22 DIAGNOSIS — R10.2 PELVIC PAIN: ICD-10-CM

## 2020-05-22 PROBLEM — Z34.90 PREGNANCY: Status: ACTIVE | Noted: 2020-05-22

## 2020-05-22 PROBLEM — Z67.90 BLOOD TYPE, RH POSITIVE: Status: ACTIVE | Noted: 2020-05-22

## 2020-05-22 LAB
GLUCOSE UR STRIP-MCNC: NEGATIVE MG/DL
PROT UR STRIP-MCNC: NEGATIVE MG/DL

## 2020-05-22 PROCEDURE — 76801 OB US < 14 WKS SINGLE FETUS: CPT | Performed by: OBSTETRICS & GYNECOLOGY

## 2020-05-22 PROCEDURE — 0502F SUBSEQUENT PRENATAL CARE: CPT | Performed by: OBSTETRICS & GYNECOLOGY

## 2020-05-22 NOTE — PROGRESS NOTES
Here for f/u ER last night for severe pelvic pain.  U/s today wnl:8W1D VIUP  NL BILATERAL ADNEXA  NO FREE FLUID  Pt reassured.  Instructions and precautions given.

## 2020-05-22 NOTE — ED PROVIDER NOTES
Subjective   History of Present Illness  History of Present Illness    Chief complaint: Abdominal pain    Location: Left lower quadrant, radiating a little bit to the left flank    Quality/Severity: Moderate, sharp    Timing/Onset/Duration: Acute onset approximately 30 minutes prior to arrival    Modifying Factors: Nothing seems to make it better or worse    Associated Symptoms: No headache.  No fever chills or cough.  No sore throat earache or nasal congestion.  No chest pain or shortness of breath.  No diarrhea or burning when she urinates.  Patient has nausea, this is no worse than usual than since she has been pregnant.  She has had an ultrasound with this pregnancy that was reportedly normal.  No vaginal bleeding or discharge.    Narrative: This 30-year-old white female presents with left lower quadrant abdominal pain.  The patient is seen by [facility].  The patient had a miscarriage in February.    PCP: Bryan    OB/GYN: [facility].      Review of Systems   Constitutional: Negative for chills and fever.   HENT: Negative for ear pain and sore throat.    Eyes: Negative for discharge and redness.   Respiratory: Negative for cough, chest tightness and shortness of breath.    Cardiovascular: Negative for chest pain, palpitations and leg swelling.   Gastrointestinal: Positive for abdominal pain and nausea. Negative for blood in stool, constipation, diarrhea and vomiting.   Genitourinary: Positive for flank pain. Negative for decreased urine volume, difficulty urinating, dysuria, frequency, hematuria and urgency.   Musculoskeletal: Negative for back pain.   Skin: Negative for rash.   Neurological: Negative for headaches.   Psychiatric/Behavioral: Negative.  Negative for confusion.        Medication List      ASK your doctor about these medications    One-A-Day Womens Prenatal 1 28-0.8-235 MG capsule     pantoprazole 40 MG EC tablet  Commonly known as:  PROTONIX  Take 1 tablet by mouth Daily.      prenatal (CLASSIC) vitamin 28-0.8 MG tablet tablet          Past Medical History:   Diagnosis Date   • Asthma    • Headache    • Hypertension    • PCOS (polycystic ovarian syndrome) 07/2013       No Known Allergies    Past Surgical History:   Procedure Laterality Date   • ADENOIDECTOMY     • DIAGNOSTIC LAPAROSCOPY N/A 2/20/2020    Procedure: DIAGNOSTIC LAPAROSCOPY;  Surgeon: Imelda Rayo MD;  Location: [facility];  Service: Obstetrics/Gynecology;  Laterality: N/A;   • TONSILLECTOMY  02/2008    adnoids also       Family History   Problem Relation Age of Onset   • Hypertension Mother    • Ovarian cancer Mother    • Hypertension Maternal Grandmother    • Diabetes Maternal Grandfather    • Hypertension Paternal Grandmother    • Diabetes Paternal Grandfather    • Deep vein thrombosis Maternal Aunt    • Breast cancer Neg Hx    • Colon cancer Neg Hx    • Pulmonary embolism Neg Hx    • Anesthesia problems Neg Hx        Social History     Socioeconomic History   • Marital status:      Spouse name: Not on file   • Number of children: Not on file   • Years of education: Not on file   • Highest education level: Not on file   Tobacco Use   • Smoking status: Current Every Day Smoker     Packs/day: 0.25     Types: Cigarettes   • Smokeless tobacco: Never Used   • Tobacco comment: states is in the process of quitting   Substance and Sexual Activity   • Alcohol use: Not Currently   • Drug use: No   • Sexual activity: Yes     Partners: Male     Birth control/protection: None           Objective   Physical Exam   Constitutional: She is oriented to person, place, and time. She appears well-developed and well-nourished. No distress.   ED Triage Vitals (05/21/20 2150)  Temp: 97.1 °F (36.2 °C)  Heart Rate: 80  Resp: 16  BP: 129/83  SpO2: 99 %  Temp src: Oral  Heart Rate Source: n/a  Patient Position: n/a  BP Location: n/a  FiO2 (%): n/a    The patient's vitals were reviewed by me.  Unless otherwise noted they are  within normal limits.     HENT:   Head: Normocephalic and atraumatic.   Right Ear: External ear normal.   Left Ear: External ear normal.   Nose: Nose normal.   Mouth/Throat: Oropharynx is clear and moist.   Eyes: Pupils are equal, round, and reactive to light. Conjunctivae and EOM are normal. Right eye exhibits no discharge. Left eye exhibits no discharge.   Neck: Normal range of motion. Neck supple. No JVD present. No tracheal deviation present. No thyromegaly present.   Cardiovascular: Normal rate, regular rhythm, normal heart sounds and intact distal pulses. Exam reveals no gallop and no friction rub.   No murmur heard.  Pelvic exam: No external lesions noted.  There is minimal whitish discharge.  The osseous closed.  There is no bleeding.  There is no cervical motion tenderness.  There is mild left adnexal tenderness there is no masses.   Pulmonary/Chest: Effort normal and breath sounds normal. No stridor. No respiratory distress. She has no wheezes. She has no rales. She exhibits no tenderness.   Abdominal: Soft. Bowel sounds are normal. She exhibits no distension and no mass. There is tenderness. There is no rebound and no guarding. No hernia.   Mild left lower quadrant abdominal pain   Musculoskeletal: Normal range of motion. She exhibits no edema or deformity.   Lymphadenopathy:     She has no cervical adenopathy.   Neurological: She is alert and oriented to person, place, and time.   Skin: Skin is warm and dry. No rash noted. She is not diaphoretic. No erythema. No pallor.   Psychiatric: Her behavior is normal.   Nursing note and vitals reviewed.      Procedures           ED Course  ED Course as of May 21 2225   Thu May 21, 2020   2223 The laboratory values were reviewed by me.  The white blood cell count is 14,000.  The hemoglobin is 11.3.  The absolute neutrophil count is 8.4.  The urinalysis is normal.  The PCR Chlamydia GC is pending.  The KOH prep is pending.  The laboratory values are otherwise  unremarkable.    [RC]      ED Course User Index  [RC] Chidi Spangler MD      22:29, 05/21/20:  I spoke with Dr. Marcos, on-call for OB/GYN.  They will see the patient in the office tomorrow.  The patient should call at 8:15 AM to be seen tomorrow morning without fail.     22:30, 05/21/20:  The patient was reassessed.  Her vital signs reviewed and are stable.  Abdominal exam: Soft, mild left lower quadrant tenderness, no rebound, no guarding, no masses, positive bowel sounds.    22:30, 05/21/20:  The patient's diagnosis of left lower quadrant abdominal pain was discussed with her.  The patient should call Dr. Marcos's office at 8:15 in the morning to be seen tomorrow morning without fail.  Patient should return to the emergency department if there is increased pain, bleeding, worse in any way at all.  All the patient's questions were answered she will be discharged in good condition.                                           MDM  No orders to display     Labs Reviewed   HCG, QUANTITATIVE, PREGNANCY   CBC WITH AUTO DIFFERENTIAL   URINALYSIS W/ MICROSCOPIC IF INDICATED (NO CULTURE)   CBC AND DIFFERENTIAL    Narrative:     The following orders were created for panel order CBC & Differential.  Procedure                               Abnormality         Status                     ---------                               -----------         ------                     CBC Auto Differential[[id number]]                                                         Please view results for these tests on the individual orders.     No results found.    Final diagnoses:   Left lower quadrant abdominal pain   8 weeks gestation of pregnancy         ED Medications:  Medications - No data to display    New Medications:     Medication List      ASK your doctor about these medications    One-A-Day Womens Prenatal 1 28-0.8-235 MG capsule     pantoprazole 40 MG EC tablet  Commonly known as:  PROTONIX  Take 1 tablet by mouth Daily.      prenatal (CLASSIC) vitamin 28-0.8 MG tablet tablet          Stopped Medications:     Medication List      ASK your doctor about these medications    One-A-Day Womens Prenatal 1 28-0.8-235 MG capsule     pantoprazole 40 MG EC tablet  Commonly known as:  PROTONIX  Take 1 tablet by mouth Daily.     prenatal (CLASSIC) vitamin 28-0.8 MG tablet tablet            Final diagnoses:   Left lower quadrant abdominal pain   8 weeks gestation of pregnancy            Chidi Spangler MD  05/21/20 1961

## 2020-05-22 NOTE — DISCHARGE INSTRUCTIONS
Call Dr. Marcos's office at 8:15 in the morning to be seen in the morning without fail.  Return to the emergency department there is increased pain, bleeding, worse in any way at all.  Take Tylenol as needed as directed for pain.

## 2020-05-30 LAB
C TRACH RRNA SPEC DONR QL NAA+PROBE: NORMAL
N GONORRHOEA DNA SPEC QL NAA+PROBE: NORMAL

## 2020-06-02 ENCOUNTER — ROUTINE PRENATAL (OUTPATIENT)
Dept: OBSTETRICS AND GYNECOLOGY | Facility: CLINIC | Age: 30
End: 2020-06-02

## 2020-06-02 VITALS — SYSTOLIC BLOOD PRESSURE: 144 MMHG | BODY MASS INDEX: 31.66 KG/M2 | DIASTOLIC BLOOD PRESSURE: 80 MMHG | WEIGHT: 214.4 LBS

## 2020-06-02 DIAGNOSIS — O10.011 PRE-EXISTING ESSENTIAL HYPERTENSION DURING PREGNANCY IN FIRST TRIMESTER: Primary | ICD-10-CM

## 2020-06-02 LAB
GLUCOSE UR STRIP-MCNC: NEGATIVE MG/DL
PROT UR STRIP-MCNC: NEGATIVE MG/DL

## 2020-06-02 PROCEDURE — 0502F SUBSEQUENT PRENATAL CARE: CPT | Performed by: OBSTETRICS & GYNECOLOGY

## 2020-06-02 NOTE — PROGRESS NOTES
OB follow up     Es Branch is a 30 y.o.  11w0d being seen today for her obstetrical visit.  Patient reports no bleeding, no contractions and no leaking. Fetal movement: absent.  Patient called last night while was on call complaining of hypertension.  She had a blood pressure of 150/90.  She is concerned about preeclampsia.  She is only 11 weeks by first trimester ultrasound.  She denies any headache or visual changes.  She is not on any antihypertensive medications at this time.    Review of Systems  No bleeding, No cramping/contractions     /80   Wt 97.3 kg (214 lb 6.4 oz)   LMP 2020 (Exact Date)   BMI 31.66 kg/m²     FHT: present BPM   Uterine Size: 11 cm       Assessment/Plan:    1) 30 y.o.  -pregnancy at 11w0d    2)   Encounter Diagnosis   Name Primary?   • Pre-existing essential hypertension during pregnancy in first trimester Yes   Check CBC, CMP and 24-hour urine.  Call for blood pressure greater than 160/100.    3) Reviewed this stage of pregnancy  4) Problem list updated     Return for Next scheduled follow up.      Murphy Wood MD    2020  13:52

## 2020-06-03 ENCOUNTER — HOSPITAL ENCOUNTER (OUTPATIENT)
Facility: HOSPITAL | Age: 30
Discharge: HOME | End: 2020-06-03
Attending: OBSTETRICS & GYNECOLOGY | Admitting: OBSTETRICS & GYNECOLOGY

## 2020-06-03 ENCOUNTER — HOSPITAL ENCOUNTER (EMERGENCY)
Facility: HOSPITAL | Age: 30
Discharge: HOME | End: 2020-06-03
Attending: EMERGENCY MEDICINE | Admitting: EMERGENCY MEDICINE

## 2020-06-03 VITALS
SYSTOLIC BLOOD PRESSURE: 109 MMHG | RESPIRATION RATE: 15 BRPM | WEIGHT: 214 LBS | OXYGEN SATURATION: 99 % | BODY MASS INDEX: 31.7 KG/M2 | HEART RATE: 69 BPM | HEIGHT: 69 IN | DIASTOLIC BLOOD PRESSURE: 66 MMHG | TEMPERATURE: 98 F

## 2020-06-03 VITALS
HEART RATE: 68 BPM | DIASTOLIC BLOOD PRESSURE: 76 MMHG | RESPIRATION RATE: 20 BRPM | BODY MASS INDEX: 31.76 KG/M2 | SYSTOLIC BLOOD PRESSURE: 118 MMHG | TEMPERATURE: 98.3 F | WEIGHT: 214.4 LBS | HEIGHT: 69 IN

## 2020-06-03 DIAGNOSIS — R03.0 TRANSIENT ELEVATED BLOOD PRESSURE: Primary | ICD-10-CM

## 2020-06-03 DIAGNOSIS — Z34.91 FIRST TRIMESTER PREGNANCY: ICD-10-CM

## 2020-06-03 LAB
ALBUMIN SERPL-MCNC: 4.2 G/DL (ref 3.5–5.2)
ALBUMIN/GLOB SERPL: 1.8 G/DL
ALP SERPL-CCNC: 53 U/L (ref 39–117)
ALT SERPL-CCNC: 18 U/L (ref 1–33)
AMPHET+METHAMPHET UR QL: NEGATIVE
AMPHETAMINES UR QL: NEGATIVE
AST SERPL-CCNC: 11 U/L (ref 1–32)
BARBITURATES UR QL SCN: NEGATIVE
BASOPHILS # BLD AUTO: 0.04 10*3/MM3 (ref 0–0.2)
BASOPHILS NFR BLD AUTO: 0.4 % (ref 0–1.5)
BENZODIAZ UR QL SCN: NEGATIVE
BILIRUB SERPL-MCNC: 0.2 MG/DL (ref 0.2–1.2)
BILIRUB UR QL STRIP: NEGATIVE
BUN SERPL-MCNC: 7 MG/DL (ref 6–20)
BUN/CREAT SERPL: 10 (ref 7–25)
BUPRENORPHINE SERPL-MCNC: NEGATIVE NG/ML
CALCIUM SERPL-MCNC: 9.9 MG/DL (ref 8.6–10.5)
CANNABINOIDS SERPL QL: NEGATIVE
CHLORIDE SERPL-SCNC: 105 MMOL/L (ref 98–107)
CLARITY UR: CLEAR
CO2 SERPL-SCNC: 21.7 MMOL/L (ref 22–29)
COCAINE UR QL: NEGATIVE
COLOR UR: YELLOW
CREAT SERPL-MCNC: 0.7 MG/DL (ref 0.57–1)
EOSINOPHIL # BLD AUTO: 0.17 10*3/MM3 (ref 0–0.4)
EOSINOPHIL NFR BLD AUTO: 1.7 % (ref 0.3–6.2)
ERYTHROCYTE [DISTWIDTH] IN BLOOD BY AUTOMATED COUNT: 13.5 % (ref 12.3–15.4)
GLOBULIN SER CALC-MCNC: 2.4 GM/DL
GLUCOSE SERPL-MCNC: 127 MG/DL (ref 65–99)
GLUCOSE UR STRIP-MCNC: NEGATIVE MG/DL
HCT VFR BLD AUTO: 37.4 % (ref 34–46.6)
HGB BLD-MCNC: 12.5 G/DL (ref 12–15.9)
HGB UR QL STRIP.AUTO: NEGATIVE
IMM GRANULOCYTES # BLD AUTO: 0.03 10*3/MM3 (ref 0–0.05)
IMM GRANULOCYTES NFR BLD AUTO: 0.3 % (ref 0–0.5)
KETONES UR QL STRIP: NEGATIVE
LEUKOCYTE ESTERASE UR QL STRIP.AUTO: NEGATIVE
LYMPHOCYTES # BLD AUTO: 2.25 10*3/MM3 (ref 0.7–3.1)
LYMPHOCYTES NFR BLD AUTO: 22 % (ref 19.6–45.3)
MCH RBC QN AUTO: 29.3 PG (ref 26.6–33)
MCHC RBC AUTO-ENTMCNC: 33.4 G/DL (ref 31.5–35.7)
MCV RBC AUTO: 87.8 FL (ref 79–97)
METHADONE UR QL SCN: NEGATIVE
MONOCYTES # BLD AUTO: 0.57 10*3/MM3 (ref 0.1–0.9)
MONOCYTES NFR BLD AUTO: 5.6 % (ref 5–12)
NEUTROPHILS # BLD AUTO: 7.17 10*3/MM3 (ref 1.7–7)
NEUTROPHILS NFR BLD AUTO: 70 % (ref 42.7–76)
NITRITE UR QL STRIP: NEGATIVE
NRBC BLD AUTO-RTO: 0 /100 WBC (ref 0–0.2)
OPIATES UR QL: NEGATIVE
OXYCODONE UR QL SCN: NEGATIVE
PCP UR QL SCN: NEGATIVE
PH UR STRIP.AUTO: 6 [PH] (ref 4.5–8)
PLATELET # BLD AUTO: 363 10*3/MM3 (ref 140–450)
POTASSIUM SERPL-SCNC: 4.1 MMOL/L (ref 3.5–5.2)
PROPOXYPH UR QL: NEGATIVE
PROT SERPL-MCNC: 6.6 G/DL (ref 6–8.5)
PROT UR QL STRIP: NEGATIVE
RBC # BLD AUTO: 4.26 10*6/MM3 (ref 3.77–5.28)
SODIUM SERPL-SCNC: 138 MMOL/L (ref 136–145)
SP GR UR STRIP: 1.01 (ref 1–1.03)
TRICYCLICS UR QL SCN: NEGATIVE
UROBILINOGEN UR QL STRIP: NORMAL
WBC # BLD AUTO: 10.23 10*3/MM3 (ref 3.4–10.8)

## 2020-06-03 PROCEDURE — 81003 URINALYSIS AUTO W/O SCOPE: CPT | Performed by: OBSTETRICS & GYNECOLOGY

## 2020-06-03 PROCEDURE — 99284 EMERGENCY DEPT VISIT MOD MDM: CPT

## 2020-06-03 PROCEDURE — G0463 HOSPITAL OUTPT CLINIC VISIT: HCPCS

## 2020-06-03 PROCEDURE — 99284 EMERGENCY DEPT VISIT MOD MDM: CPT | Performed by: EMERGENCY MEDICINE

## 2020-06-03 PROCEDURE — 80306 DRUG TEST PRSMV INSTRMNT: CPT | Performed by: OBSTETRICS & GYNECOLOGY

## 2020-06-03 RX ORDER — METOPROLOL SUCCINATE 25 MG/1
25 TABLET, EXTENDED RELEASE ORAL DAILY
Qty: 30 TABLET | Refills: 0 | Status: SHIPPED | OUTPATIENT
Start: 2020-06-03 | End: 2020-06-08 | Stop reason: ALTCHOICE

## 2020-06-03 RX ORDER — ACETAMINOPHEN 500 MG
1000 TABLET ORAL ONCE
Status: COMPLETED | OUTPATIENT
Start: 2020-06-03 | End: 2020-06-03

## 2020-06-03 RX ADMIN — ACETAMINOPHEN 1000 MG: 500 TABLET, FILM COATED ORAL at 18:30

## 2020-06-03 NOTE — ED PROVIDER NOTES
" EMERGENCY DEPARTMENT ENCOUNTER      Room Number: 3/03      HPI:    Chief complaint: High blood pressure    Location: Not applicable    Quality/Severity: Mild    Timing/Duration: Symptoms started several days ago     Modifying Factors: Patient with current estimated gestational age of 11 weeks    Associated Symptoms: Headache    Narrative: Pt is a 30 y.o. female who presents complaining of high blood pressure and headache as noted above.  Patient is G2 SAB 1 and has noted blood pressures up to 160/100 over the past few days.  Patient with a previous history of hypertension for which she took beta-blockers.  Patient relates that she lost 40 pounds and was able to come off of her antihypertensive.  However, when the patient was pregnant earlier this year in January and February she was placed back on metoprolol 100 mg a day.  This had to be discontinued due to low blood pressures.  Unfortunately, the patient had a spontaneous .  Family history of hypertension.  Patient is in the midst of collecting a 24-hour urine for protein.  Urinalysis performed in women's health prior to being sent to our emergency department was negative for protein.  Bedside ultrasound performed in the obstetric office yesterday showed positive fetal movement per the patient.  No complaint of vaginal bleeding.      PMD: Rosetta Adams MD    REVIEW OF SYSTEMS  Review of Systems   Constitutional: Positive for activity change (Patient has been resting the past few days). Negative for appetite change, fatigue and fever.   HENT: Negative for congestion.    Eyes: Positive for visual disturbance (Patient relates that she sometimes sees \"floaters\".).   Respiratory: Negative for cough and shortness of breath.    Cardiovascular: Positive for leg swelling (Mild in feet and ankle). Negative for chest pain.   Gastrointestinal: Negative for abdominal pain, diarrhea, nausea and vomiting.   Genitourinary: Negative for dysuria, frequency, pelvic " pain, vaginal bleeding and vaginal discharge.   Neurological: Positive for headaches. Negative for seizures.   Psychiatric/Behavioral: Negative for confusion.   All other systems reviewed and are negative.      PAST MEDICAL HISTORY  Active Ambulatory Problems     Diagnosis Date Noted   • S/P laparoscopy 02/20/2020   • Pregnancy 05/22/2020   • Pelvic pain 05/22/2020   • Blood type, Rh positive 05/22/2020   • Pre-existing essential hypertension during pregnancy in first trimester 06/02/2020     Resolved Ambulatory Problems     Diagnosis Date Noted   • Ectopic pregnancy 02/20/2020     Past Medical History:   Diagnosis Date   • Asthma    • Headache    • Hypertension    • PCOS (polycystic ovarian syndrome) 07/2013       PAST SURGICAL HISTORY  Past Surgical History:   Procedure Laterality Date   • ADENOIDECTOMY     • DIAGNOSTIC LAPAROSCOPY N/A 2/20/2020    Procedure: DIAGNOSTIC LAPAROSCOPY;  Surgeon: Imelda Rayo MD;  Location: [facility];  Service: Obstetrics/Gynecology;  Laterality: N/A;   • TONSILLECTOMY  02/2008    adnoids also       FAMILY HISTORY  Family History   Problem Relation Age of Onset   • Hypertension Mother    • Ovarian cancer Mother    • Hypertension Maternal Grandmother    • Diabetes Maternal Grandfather    • Hypertension Paternal Grandmother    • Diabetes Paternal Grandfather    • Deep vein thrombosis Maternal Aunt    • Breast cancer Neg Hx    • Colon cancer Neg Hx    • Pulmonary embolism Neg Hx    • Anesthesia problems Neg Hx        SOCIAL HISTORY  Social History     Socioeconomic History   • Marital status:      Spouse name: Not on file   • Number of children: Not on file   • Years of education: Not on file   • Highest education level: Not on file   Tobacco Use   • Smoking status: Current Every Day Smoker     Packs/day: 0.25     Types: Cigarettes   • Smokeless tobacco: Never Used   • Tobacco comment: states is in the process of quitting   Substance and Sexual Activity   • Alcohol  use: Not Currently   • Drug use: No   • Sexual activity: Yes     Partners: Male     Birth control/protection: None       ALLERGIES  Patient has no known allergies.    PHYSICAL EXAM  ED Triage Vitals   Temp Heart Rate Resp BP SpO2   06/03/20 1807 06/03/20 1804 06/03/20 1804 06/03/20 1804 06/03/20 1804   98 °F (36.7 °C) 69 16 (!) 144/101 99 %      Temp src Heart Rate Source Patient Position BP Location FiO2 (%)   06/03/20 1807 06/03/20 1804 -- -- --   Oral Monitor          Physical Exam   Constitutional: She is oriented to person, place, and time.   The patient is an obese, 30-year-old, white female no acute distress.   HENT:   Head: Normocephalic and atraumatic.   Eyes: Pupils are equal, round, and reactive to light. Conjunctivae and EOM are normal.   Neck: Normal range of motion. Neck supple.   Cardiovascular: Normal rate, regular rhythm and normal heart sounds.   Pulmonary/Chest: Effort normal and breath sounds normal. No respiratory distress.   Neurological: She is alert and oriented to person, place, and time. No cranial nerve deficit.   Skin: Skin is warm and dry.   Psychiatric: Affect and judgment normal.   Nursing note and vitals reviewed.      LAB RESULTS  Results for orders placed or performed during the hospital encounter of 06/03/20   Urine Drug Screen -   Result Value Ref Range    THC, Screen, Urine Negative Negative    Phencyclidine (PCP), Urine Negative Negative    Cocaine Screen, Urine Negative Negative    Methamphetamine, Ur Negative Negative    Opiate Screen Negative Negative    Amphetamine Screen, Urine Negative Negative    Benzodiazepine Screen, Urine Negative Negative    Tricyclic Antidepressants Screen Negative Negative    Methadone Screen, Urine Negative Negative    Barbiturates Screen, Urine Negative Negative    Oxycodone Screen, Urine Negative Negative    Propoxyphene Screen Negative Negative    Buprenorphine, Screen, Urine Negative Negative   Urinalysis With Microscopic If Indicated (No  Culture) - Urine, Clean Catch   Result Value Ref Range    Color, UA Yellow Yellow, Straw    Appearance, UA Clear Clear    pH, UA 6.0 4.5 - 8.0    Specific Gravity, UA 1.010 1.003 - 1.030    Glucose, UA Negative Negative    Ketones, UA Negative Negative    Bilirubin, UA Negative Negative    Blood, UA Negative Negative    Protein, UA Negative Negative    Leuk Esterase, UA Negative Negative    Nitrite, UA Negative Negative    Urobilinogen, UA 0.2 E.U./dL 0.2 - 1.0 E.U./dL         I ordered the above labs and reviewed the results    RADIOLOGY  No results found.    I ordered the above radiologic testing and reviewed the results    PROCEDURES  Procedures      PROGRESS AND CONSULTS  ED Course as of Jun 03 1930   Wed Jun 03, 2020   1836 Initial blood pressure 144/101.  Subsequent blood pressure readings much more acceptable at 132/78 and 125/85.  At this point the patient will be given Tylenol for her headache and serial blood pressures rechecked.  Without proteinuria or significant pedal edema and this early in the patient's pregnancy preeclampsia unlikely.    [ML]   1914 Last blood pressure 102/59 and obstetrician on call will be consulted.    [ML]   1924 Case and pertinent data discussed with the on-call obstetrician, Dr. Rayo, who felt that the patient should be placed on low-dose metoprolol.  Treatment plan, expectations and warnings discussed with patient.    [ML]      ED Course User Index  [ML] Stuart Worrell MD           MEDICAL DECISION MAKING  Results were reviewed/discussed with the patient and they were also made aware of online access. Pt also made aware that some labs, such as cultures, will not be resulted during ER visit and follow up with PMD is necessary.     MDM       DIAGNOSIS  Final diagnoses:   Transient elevated blood pressure   First trimester pregnancy       Latest Documented Vital Signs:  As of 19:30  BP- 102/59 HR- 69 Temp- 98 °F (36.7 °C) (Oral) O2 sat- 99%    DISPOSITION  Discharged  in good condition       Medication List      New Prescriptions    metoprolol succinate XL 25 MG 24 hr tablet  Commonly known as:  TOPROL-XL  Take 1 tablet by mouth Daily.          Follow-up Information     Imelda Rayo MD.    Specialties:  Obstetrics and Gynecology, Gynecology  Why:  Follow-up as scheduled  Contact information:  [address] [facility] [address]  [place] [address]  [phone]                      Stuart Worrell MD  06/03/20 1938

## 2020-06-03 NOTE — NON STRESS TEST
Es Branch, a  at 11w1d with an DAFNE of 2020, by Ultrasound, was seen at [facility] for    Chief Complaint   Patient presents with   • Elevated Blood Pressure   • Headache       Patient Active Problem List   Diagnosis   • S/P laparoscopy   • Pregnancy   • Pelvic pain   • Blood type, Rh positive   • Pre-existing essential hypertension during pregnancy in first trimester

## 2020-06-08 ENCOUNTER — TELEPHONE (OUTPATIENT)
Dept: OBSTETRICS AND GYNECOLOGY | Facility: CLINIC | Age: 30
End: 2020-06-08

## 2020-06-08 ENCOUNTER — PROCEDURE VISIT (OUTPATIENT)
Dept: OBSTETRICS AND GYNECOLOGY | Facility: CLINIC | Age: 30
End: 2020-06-08

## 2020-06-08 ENCOUNTER — ROUTINE PRENATAL (OUTPATIENT)
Dept: OBSTETRICS AND GYNECOLOGY | Facility: CLINIC | Age: 30
End: 2020-06-08

## 2020-06-08 VITALS — BODY MASS INDEX: 31.6 KG/M2 | WEIGHT: 214 LBS | DIASTOLIC BLOOD PRESSURE: 72 MMHG | SYSTOLIC BLOOD PRESSURE: 110 MMHG

## 2020-06-08 DIAGNOSIS — O09.899 RUBELLA NON-IMMUNE STATUS, ANTEPARTUM: ICD-10-CM

## 2020-06-08 DIAGNOSIS — Z36.9 ENCOUNTER FOR ANTENATAL SCREENING, UNSPECIFIED: Primary | ICD-10-CM

## 2020-06-08 DIAGNOSIS — Z34.90 PREGNANCY, UNSPECIFIED GESTATIONAL AGE: ICD-10-CM

## 2020-06-08 DIAGNOSIS — R73.09 ELEVATED GLYCOSYLATED HEMOGLOBIN: ICD-10-CM

## 2020-06-08 DIAGNOSIS — O10.011 PRE-EXISTING ESSENTIAL HYPERTENSION DURING PREGNANCY IN FIRST TRIMESTER: Primary | ICD-10-CM

## 2020-06-08 DIAGNOSIS — Z28.39 RUBELLA NON-IMMUNE STATUS, ANTEPARTUM: ICD-10-CM

## 2020-06-08 DIAGNOSIS — E28.2 PCOS (POLYCYSTIC OVARIAN SYNDROME): ICD-10-CM

## 2020-06-08 DIAGNOSIS — O10.011 PRE-EXISTING ESSENTIAL HYPERTENSION DURING PREGNANCY IN FIRST TRIMESTER: ICD-10-CM

## 2020-06-08 DIAGNOSIS — D64.9 ANEMIA, UNSPECIFIED TYPE: ICD-10-CM

## 2020-06-08 PROBLEM — Z98.890 S/P LAPAROSCOPY: Status: RESOLVED | Noted: 2020-02-20 | Resolved: 2020-06-08

## 2020-06-08 PROBLEM — Z67.90 BLOOD TYPE, RH POSITIVE: Status: RESOLVED | Noted: 2020-05-22 | Resolved: 2020-06-08

## 2020-06-08 PROBLEM — R10.2 PELVIC PAIN: Status: RESOLVED | Noted: 2020-05-22 | Resolved: 2020-06-08

## 2020-06-08 LAB
GLUCOSE UR STRIP-MCNC: NEGATIVE MG/DL
PROT UR STRIP-MCNC: NEGATIVE MG/DL

## 2020-06-08 PROCEDURE — 0502F SUBSEQUENT PRENATAL CARE: CPT | Performed by: OBSTETRICS & GYNECOLOGY

## 2020-06-08 PROCEDURE — 76801 OB US < 14 WKS SINGLE FETUS: CPT | Performed by: OBSTETRICS & GYNECOLOGY

## 2020-06-08 RX ORDER — LABETALOL 100 MG/1
TABLET, FILM COATED ORAL
Qty: 30 TABLET | Refills: 6 | Status: SHIPPED | OUTPATIENT
Start: 2020-06-08 | End: 2020-12-18 | Stop reason: HOSPADM

## 2020-06-08 RX ORDER — DOXYCYCLINE HYCLATE 50 MG/1
324 CAPSULE, GELATIN COATED ORAL
Qty: 30 TABLET | Refills: 6 | Status: SHIPPED | OUTPATIENT
Start: 2020-06-08 | End: 2021-05-26

## 2020-06-08 NOTE — PROGRESS NOTES
OB follow up     Es Branch is a 30 y.o.  11w6d being seen today for her obstetrical visit.  Patient reports she was seen in the ER for elevated BP and started on Toprol XL 25 mg. She says her BP have been very labile. . Fetal movement: not yet. This is the first time I am seeing this patient in this pregnancy and all of her problems are new to me.     Review of Systems  No bleeding, No cramping/contractions     /72   Wt 97.1 kg (214 lb)   LMP 2020 (Exact Date)   BMI 31.60 kg/m²     FHT: 171 BPM   Uterine Size: 12  cm       Assessment/Plan:    1) 30 y.o.  -pregnancy at 11w6d-ultrasound today shows viable fetus with heart rate of 171.  Good interval growth since last scan on 2020.    2) Pt desires NIPT testing. Declines CF/SMA/FX/ECS.     3) RNI - needs MMR pp. avoid kids with fevers and rashes    4) CHTN-Baseline 24-hour urine was 80 mg.  The patient has had very labile blood pressures and is come in to the office in ER several times with elevated pressures.  She was started on Toprol 25 mg daily.  I discussed that I would like to switch her from Toprol to labetalol 50 mg a day, as Toprol can be associated with growth restriction.      5) Anemia-hemoglobin 11.3.  Start daily iron.    6) Hemoglobin A1c elevated at 5.7-patient has PCOS and has strong family history of diabetes.  We discussed dietary changes to make as well as  starting exercise.  We will do an early 2-hour GTT at 20 weeks and then again at 28 weeks if her early test is normal.    7)Reviewed this stage of pregnancy. COVID 19 precautions given. I saw the patient with a face mask, gloves and a face shield.  The patient herself was masked.  Social distancing was observed as appropriate.    8)Problem list updated     9) RTO 4 weeks OBT and AFP      Imelda Rayo MD    2020  11:48

## 2020-06-08 NOTE — TELEPHONE ENCOUNTER
Patient calling to see if you will write a note stating she can go back to work and what limitations she has if any.

## 2020-06-12 LAB
CFDNA.FET/CFDNA.TOTAL SFR FETUS: NORMAL %
CFDNA.FET/CFDNA.TOTAL SFR FETUS: NORMAL %
CITATION REF LAB TEST: NORMAL
FET 13+18+21+X+Y ANEUP PLAS.CFDNA: NEGATIVE
FET CHR 21 TS PLAS.CFDNA QL: NEGATIVE
FET SEX PLAS.CFDNA DOSAGE CFDNA: NORMAL
FET TS 13 RISK PLAS.CFDNA QL: NEGATIVE
FET TS 18 RISK WBC.DNA+CFDNA QL: NEGATIVE
GA EST FROM CONCEPTION DATE: NORMAL D
GESTATIONAL AGE > 9:: YES
LAB DIRECTOR NAME PROVIDER: NORMAL
LABORATORY COMMENT REPORT: NORMAL
LIMITATIONS OF THE TEST: NORMAL
NEGATIVE PREDICTIVE VALUE: NORMAL
NOTE: NORMAL
PERFORMANCE CHARACTERISTICS: NORMAL
POSITIVE PREDICTIVE VALUE: NORMAL
REF LAB TEST METHOD: NORMAL
TEST PERFORMANCE INFO SPEC: NORMAL

## 2020-06-26 ENCOUNTER — ROUTINE PRENATAL (OUTPATIENT)
Dept: OBSTETRICS AND GYNECOLOGY | Facility: CLINIC | Age: 30
End: 2020-06-26

## 2020-06-26 ENCOUNTER — HOSPITAL ENCOUNTER (OUTPATIENT)
Dept: ULTRASOUND IMAGING | Facility: HOSPITAL | Age: 30
Discharge: HOME | End: 2020-06-26
Admitting: NURSE PRACTITIONER

## 2020-06-26 VITALS — WEIGHT: 213.6 LBS | SYSTOLIC BLOOD PRESSURE: 112 MMHG | DIASTOLIC BLOOD PRESSURE: 78 MMHG | BODY MASS INDEX: 31.54 KG/M2

## 2020-06-26 DIAGNOSIS — N63.0 BREAST LUMP IN FEMALE: ICD-10-CM

## 2020-06-26 DIAGNOSIS — Z3A.14 14 WEEKS GESTATION OF PREGNANCY: Primary | ICD-10-CM

## 2020-06-26 DIAGNOSIS — R73.09 ELEVATED GLYCOSYLATED HEMOGLOBIN: ICD-10-CM

## 2020-06-26 DIAGNOSIS — Z28.39 RUBELLA NON-IMMUNE STATUS, ANTEPARTUM: ICD-10-CM

## 2020-06-26 DIAGNOSIS — O09.899 RUBELLA NON-IMMUNE STATUS, ANTEPARTUM: ICD-10-CM

## 2020-06-26 DIAGNOSIS — O10.011 PRE-EXISTING ESSENTIAL HYPERTENSION DURING PREGNANCY IN FIRST TRIMESTER: ICD-10-CM

## 2020-06-26 LAB
GLUCOSE UR STRIP-MCNC: NEGATIVE MG/DL
PROT UR STRIP-MCNC: NEGATIVE MG/DL

## 2020-06-26 PROCEDURE — 0502F SUBSEQUENT PRENATAL CARE: CPT | Performed by: NURSE PRACTITIONER

## 2020-06-26 PROCEDURE — 76642 ULTRASOUND BREAST LIMITED: CPT

## 2020-06-26 NOTE — PROGRESS NOTES
"OB follow up > 20 weeks    Chief Complaint   Patient presents with   • Breast Problem     Breast Pain Left Side       Es Branch is a 30 y.o.  14w3d being seen today for her obstetrical visit.  Patient reports breast complaints. She has a history of breast pain prior to pregnancy. She know notices her (L) breast feels like she has \"glass shards\" come out of her breast. She felt like she noticed a lump in her (L) breast. This problem is new to me, the examiner.  Taking prenatal vitamins: Yes      Review of Systems  Constitutional: Neg fatigue   Cardiovascular: neg edema  Gastrointestinal: neg n/v  Genitourinary: Negative for contractions, cramping, vaginal bleeding, or SROM.   Fetal movement: normal    /78   Wt 96.9 kg (213 lb 9.6 oz)   LMP 2020 (Exact Date)   BMI 31.54 kg/m²     FHT: present BPM   Uterine Size: size equals dates     Breast: Dense breast corby. (L) breast- tender to palpation, lower aspect of areola raised, normal color. (R) breast- lump, firm, outer aspect of breast deep in tissue near axillary line       Assessment    1) pregnancy at 14w3d- NIPS neg, male.     2) Breast complaint- Check breast US stat. Enc good support bra and Vit E for supplement.      3) RNI - needs MMR pp. avoid kids with fevers and rashes     4) CHTN-Baseline 24-hour urine was 80 mg.  Pt taking Labetalol 50mg daily.      5) Anemia-hemoglobin 11.3.  Taking iron daily.      6) Hemoglobin A1c elevated at 5.7-patient has PCOS and has strong family history of diabetes.  We discussed dietary changes to make as well as  starting exercise.  We will do an early 2-hour GTT at 20 weeks and then again at 28 weeks if her early test is normal.     7)Reviewed this stage of pregnancy. COVID 19 precautions given. I saw the patient with a face mask, gloves and a face shield.  The patient herself was masked.  Social distancing was observed as appropriate.    Plan    Continue prenatal vitamins  Reviewed this stage of " pregnancy  Problem list updated   Keep scheduled appt     María Elena Arthur, NORMAN  6/26/2020  12:01

## 2020-07-06 ENCOUNTER — ROUTINE PRENATAL (OUTPATIENT)
Dept: OBSTETRICS AND GYNECOLOGY | Facility: CLINIC | Age: 30
End: 2020-07-06

## 2020-07-06 ENCOUNTER — TELEPHONE (OUTPATIENT)
Dept: OBSTETRICS AND GYNECOLOGY | Facility: CLINIC | Age: 30
End: 2020-07-06

## 2020-07-06 VITALS — BODY MASS INDEX: 31.4 KG/M2 | WEIGHT: 212.6 LBS | SYSTOLIC BLOOD PRESSURE: 110 MMHG | DIASTOLIC BLOOD PRESSURE: 70 MMHG

## 2020-07-06 DIAGNOSIS — O10.011 PRE-EXISTING ESSENTIAL HYPERTENSION DURING PREGNANCY IN FIRST TRIMESTER: ICD-10-CM

## 2020-07-06 DIAGNOSIS — N60.01 BENIGN CYST OF RIGHT BREAST: Primary | ICD-10-CM

## 2020-07-06 DIAGNOSIS — Z36.9 ENCOUNTER FOR ANTENATAL SCREENING, UNSPECIFIED: ICD-10-CM

## 2020-07-06 DIAGNOSIS — Z3A.15 15 WEEKS GESTATION OF PREGNANCY: Primary | ICD-10-CM

## 2020-07-06 DIAGNOSIS — R73.09 ELEVATED GLYCOSYLATED HEMOGLOBIN: ICD-10-CM

## 2020-07-06 DIAGNOSIS — N60.01 SOLITARY CYST OF RIGHT BREAST: ICD-10-CM

## 2020-07-06 LAB
GLUCOSE UR STRIP-MCNC: NEGATIVE MG/DL
PROT UR STRIP-MCNC: NEGATIVE MG/DL

## 2020-07-06 PROCEDURE — 0502F SUBSEQUENT PRENATAL CARE: CPT | Performed by: OBSTETRICS & GYNECOLOGY

## 2020-07-06 NOTE — PROGRESS NOTES
OB follow up     Chief Complaint: [facility] FU    Es Branch is a 30 y.o.  15w6d being seen today for her obstetrical visit.  This is the first time I am seeing this patient in this pregnancy.  Patient reports right breast pain. Pt was diagnosed with a large bengin appearing right breast cyst measuring 5.7cm. Fetal movement: not yet.  Patient has had cell free DNA testing.  She accepts AFP today.  Patient had a borderline hemoglobin A1c at 5.7.  She is aware that she will need an early 2-hour glucose tolerance test at 20 weeks.  In reviewing patient's history she has reported being a tobacco smoker.  She states today that she quit 4 weeks ago.    Review of Systems  No bleeding, No cramping/contractions     /70   Wt 96.4 kg (212 lb 9.6 oz)   LMP 2020 (Exact Date)   BMI 31.40 kg/m²     FHT:   Present   Uterine Size:   16cm         Assessment/Plan: Low risk pregnancy    1) pregnancy at 15w6d: cfDNA low risk. AFP today. Anatomy US next visit.     2) right breast cyst: benign appearing cyst measures 5.7cm. Pt having pain and requesting drainage. Will scheduled US guided drainage of cyst..     3) RNI - needs MMR pp. avoid kids with fevers and rashes     4) CHTN-Baseline 24-hour urine was 80 mg.  Pt taking Labetalol 50mg daily.      5) Anemia-hemoglobin 11.3.  Taking iron daily.      6) Hemoglobin A1c elevated at 5.7-patient has PCOS and has strong family history of diabetes.  We discussed dietary changes to make as well as  starting exercise.  Check 2hr GTT at 20 weeks and then again at 28 weeks if 20 week GTT is normal.     7) previous tobacco smoker: Quit 4 weeks ago.    Reviewed this stage of pregnancy  Problem list updated   No follow-ups on file.      Prachi Kim DO    2020  10:52

## 2020-07-08 ENCOUNTER — HOSPITAL ENCOUNTER (OUTPATIENT)
Dept: ULTRASOUND IMAGING | Facility: HOSPITAL | Age: 30
Discharge: HOME | End: 2020-07-08
Admitting: OBSTETRICS & GYNECOLOGY

## 2020-07-08 DIAGNOSIS — N60.01 BENIGN CYST OF RIGHT BREAST: ICD-10-CM

## 2020-07-08 PROCEDURE — 25010000003 LIDOCAINE 1 % SOLUTION: Performed by: OBSTETRICS & GYNECOLOGY

## 2020-07-08 PROCEDURE — 76942 ECHO GUIDE FOR BIOPSY: CPT

## 2020-07-08 PROCEDURE — 88112 CYTOPATH CELL ENHANCE TECH: CPT | Performed by: OBSTETRICS & GYNECOLOGY

## 2020-07-08 RX ORDER — LIDOCAINE HYDROCHLORIDE 10 MG/ML
5 INJECTION, SOLUTION INFILTRATION; PERINEURAL ONCE
Status: COMPLETED | OUTPATIENT
Start: 2020-07-08 | End: 2020-07-08

## 2020-07-08 RX ADMIN — SODIUM BICARBONATE 0.5 MEQ: 0.2 INJECTION, SOLUTION INTRAVENOUS at 11:41

## 2020-07-08 RX ADMIN — LIDOCAINE HYDROCHLORIDE 5 ML: 10 INJECTION, SOLUTION INFILTRATION; PERINEURAL at 11:41

## 2020-07-09 LAB
AFP ADJ MOM SERPL: 0.79
AFP INTERP SERPL-IMP: NORMAL
AFP INTERP SERPL-IMP: NORMAL
AFP SERPL-MCNC: 20.6 NG/ML
AGE AT DELIVERY: 30.7 YR
CYTO UR: NORMAL
GA METHOD: NORMAL
GA: 15.9 WEEKS
IDDM PATIENT QL: NO
LAB AP CASE REPORT: NORMAL
LABORATORY COMMENT REPORT: NORMAL
MULTIPLE PREGNANCY: NO
NEURAL TUBE DEFECT RISK FETUS: NORMAL %
PATH REPORT.FINAL DX SPEC: NORMAL
PATH REPORT.GROSS SPEC: NORMAL
RESULT: NORMAL

## 2020-07-12 ENCOUNTER — HOSPITAL ENCOUNTER (EMERGENCY)
Facility: HOSPITAL | Age: 30
Discharge: HOME | End: 2020-07-12
Attending: EMERGENCY MEDICINE | Admitting: EMERGENCY MEDICINE

## 2020-07-12 VITALS
HEIGHT: 69 IN | RESPIRATION RATE: 16 BRPM | SYSTOLIC BLOOD PRESSURE: 129 MMHG | TEMPERATURE: 97.5 F | BODY MASS INDEX: 31.55 KG/M2 | HEART RATE: 79 BPM | DIASTOLIC BLOOD PRESSURE: 90 MMHG | WEIGHT: 213 LBS | OXYGEN SATURATION: 98 %

## 2020-07-12 DIAGNOSIS — Z3A.16 16 WEEKS GESTATION OF PREGNANCY: ICD-10-CM

## 2020-07-12 DIAGNOSIS — R55 NEAR SYNCOPE: Primary | ICD-10-CM

## 2020-07-12 DIAGNOSIS — O16.2 HYPERTENSION DURING PREGNANCY IN SECOND TRIMESTER, UNSPECIFIED HYPERTENSION IN PREGNANCY TYPE: ICD-10-CM

## 2020-07-12 LAB
ALBUMIN SERPL-MCNC: 3.3 G/DL (ref 3.5–5.2)
ALBUMIN/GLOB SERPL: 1.2 G/DL
ALP SERPL-CCNC: 53 U/L (ref 39–117)
ALT SERPL W P-5'-P-CCNC: 14 U/L (ref 1–33)
AMORPH URATE CRY URNS QL MICRO: ABNORMAL /HPF
ANION GAP SERPL CALCULATED.3IONS-SCNC: 11.6 MMOL/L (ref 5–15)
AST SERPL-CCNC: 12 U/L (ref 1–32)
BACTERIA UR QL AUTO: ABNORMAL /HPF
BASOPHILS # BLD AUTO: 0.04 10*3/MM3 (ref 0–0.2)
BASOPHILS NFR BLD AUTO: 0.3 % (ref 0–1.5)
BILIRUB SERPL-MCNC: <0.2 MG/DL (ref 0–1.2)
BILIRUB UR QL STRIP: NEGATIVE
BUN SERPL-MCNC: 6 MG/DL (ref 6–20)
BUN/CREAT SERPL: 10.2 (ref 7–25)
CALCIUM SPEC-SCNC: 9.4 MG/DL (ref 8.6–10.5)
CHLORIDE SERPL-SCNC: 102 MMOL/L (ref 98–107)
CLARITY UR: CLEAR
CO2 SERPL-SCNC: 22.4 MMOL/L (ref 22–29)
COLOR UR: YELLOW
CREAT SERPL-MCNC: 0.59 MG/DL (ref 0.57–1)
DEPRECATED RDW RBC AUTO: 45.9 FL (ref 37–54)
EOSINOPHIL # BLD AUTO: 0.21 10*3/MM3 (ref 0–0.4)
EOSINOPHIL NFR BLD AUTO: 1.5 % (ref 0.3–6.2)
ERYTHROCYTE [DISTWIDTH] IN BLOOD BY AUTOMATED COUNT: 14 % (ref 12.3–15.4)
GFR SERPL CREATININE-BSD FRML MDRD: 120 ML/MIN/1.73
GLOBULIN UR ELPH-MCNC: 2.7 GM/DL
GLUCOSE SERPL-MCNC: 98 MG/DL (ref 65–99)
GLUCOSE UR STRIP-MCNC: NEGATIVE MG/DL
HCT VFR BLD AUTO: 33.8 % (ref 34–46.6)
HGB BLD-MCNC: 11 G/DL (ref 12–15.9)
HGB UR QL STRIP.AUTO: ABNORMAL
HYALINE CASTS UR QL AUTO: ABNORMAL /LPF
IMM GRANULOCYTES # BLD AUTO: 0.04 10*3/MM3 (ref 0–0.05)
IMM GRANULOCYTES NFR BLD AUTO: 0.3 % (ref 0–0.5)
KETONES UR QL STRIP: NEGATIVE
LEUKOCYTE ESTERASE UR QL STRIP.AUTO: ABNORMAL
LYMPHOCYTES # BLD AUTO: 3.1 10*3/MM3 (ref 0.7–3.1)
LYMPHOCYTES NFR BLD AUTO: 21.7 % (ref 19.6–45.3)
MCH RBC QN AUTO: 28.9 PG (ref 26.6–33)
MCHC RBC AUTO-ENTMCNC: 32.5 G/DL (ref 31.5–35.7)
MCV RBC AUTO: 88.7 FL (ref 79–97)
MONOCYTES # BLD AUTO: 1.28 10*3/MM3 (ref 0.1–0.9)
MONOCYTES NFR BLD AUTO: 9 % (ref 5–12)
NEUTROPHILS NFR BLD AUTO: 67.2 % (ref 42.7–76)
NEUTROPHILS NFR BLD AUTO: 9.59 10*3/MM3 (ref 1.7–7)
NITRITE UR QL STRIP: NEGATIVE
PH UR STRIP.AUTO: 6 [PH] (ref 4.5–8)
PLATELET # BLD AUTO: 321 10*3/MM3 (ref 140–450)
PMV BLD AUTO: 9.8 FL (ref 6–12)
POTASSIUM SERPL-SCNC: 3.8 MMOL/L (ref 3.5–5.2)
PROT SERPL-MCNC: 6 G/DL (ref 6–8.5)
PROT UR QL STRIP: NEGATIVE
RBC # BLD AUTO: 3.81 10*6/MM3 (ref 3.77–5.28)
RBC # UR: ABNORMAL /HPF
REF LAB TEST METHOD: ABNORMAL
SODIUM SERPL-SCNC: 136 MMOL/L (ref 136–145)
SP GR UR STRIP: 1.02 (ref 1–1.03)
SQUAMOUS #/AREA URNS HPF: ABNORMAL /HPF
TROPONIN T SERPL-MCNC: <0.01 NG/ML (ref 0–0.03)
UROBILINOGEN UR QL STRIP: ABNORMAL
WBC # BLD AUTO: 14.26 10*3/MM3 (ref 3.4–10.8)
WBC UR QL AUTO: ABNORMAL /HPF

## 2020-07-12 PROCEDURE — 93005 ELECTROCARDIOGRAM TRACING: CPT | Performed by: PHYSICIAN ASSISTANT

## 2020-07-12 PROCEDURE — 99284 EMERGENCY DEPT VISIT MOD MDM: CPT | Performed by: PHYSICIAN ASSISTANT

## 2020-07-12 PROCEDURE — 93010 ELECTROCARDIOGRAM REPORT: CPT | Performed by: INTERNAL MEDICINE

## 2020-07-12 PROCEDURE — 80053 COMPREHEN METABOLIC PANEL: CPT | Performed by: PHYSICIAN ASSISTANT

## 2020-07-12 PROCEDURE — 99284 EMERGENCY DEPT VISIT MOD MDM: CPT

## 2020-07-12 PROCEDURE — 81001 URINALYSIS AUTO W/SCOPE: CPT | Performed by: PHYSICIAN ASSISTANT

## 2020-07-12 PROCEDURE — 84484 ASSAY OF TROPONIN QUANT: CPT | Performed by: PHYSICIAN ASSISTANT

## 2020-07-12 PROCEDURE — 85025 COMPLETE CBC W/AUTO DIFF WBC: CPT | Performed by: PHYSICIAN ASSISTANT

## 2020-07-12 RX ORDER — SODIUM CHLORIDE 0.9 % (FLUSH) 0.9 %
10 SYRINGE (ML) INJECTION AS NEEDED
Status: DISCONTINUED | OUTPATIENT
Start: 2020-07-12 | End: 2020-07-12 | Stop reason: HOSPADM
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
## 2020-07-12 NOTE — ED NOTES
Report from [facility] and [facility] Elizabeth Merino, ARLENE  07/12/20 6427

## 2020-07-14 ENCOUNTER — ROUTINE PRENATAL (OUTPATIENT)
Dept: OBSTETRICS AND GYNECOLOGY | Facility: CLINIC | Age: 30
End: 2020-07-14

## 2020-07-14 VITALS — BODY MASS INDEX: 32.05 KG/M2 | WEIGHT: 217 LBS | SYSTOLIC BLOOD PRESSURE: 142 MMHG | DIASTOLIC BLOOD PRESSURE: 90 MMHG

## 2020-07-14 DIAGNOSIS — O10.011 PRE-EXISTING ESSENTIAL HYPERTENSION DURING PREGNANCY IN FIRST TRIMESTER: ICD-10-CM

## 2020-07-14 DIAGNOSIS — Z3A.17 17 WEEKS GESTATION OF PREGNANCY: Primary | ICD-10-CM

## 2020-07-14 LAB
EXTERNAL NIPT: NORMAL
GLUCOSE UR STRIP-MCNC: NEGATIVE MG/DL
PROT UR STRIP-MCNC: NEGATIVE MG/DL

## 2020-07-14 PROCEDURE — 0502F SUBSEQUENT PRENATAL CARE: CPT | Performed by: OBSTETRICS & GYNECOLOGY

## 2020-07-14 NOTE — PROGRESS NOTES
OB follow up     Es Branch is a 30 y.o.  17w0d being seen today for her obstetrical visit.  Patient reports no bleeding, no contractions and no leaking. Fetal movement: absent.  Patient is here to follow-up from being seen in the ED over the weekend.  Her prenatal care is complicated by chronic hypertension.  She takes labetalol 50 mg daily.  She had a syncopal episode at work on  and was seen in the emergency department.  Her blood pressure was slightly elevated.  Today she denies any headache or visual changes.    Review of Systems  No bleeding, No cramping/contractions     /90   Wt 98.4 kg (217 lb)   LMP 2020 (Exact Date)   BMI 32.05 kg/m²     FHT: present BPM   Uterine Size: 17 cm   AFP last visit was normal.  ER records were reviewed.    Assessment/Plan:    1) 30 y.o.  -pregnancy at 17w0d    2)   Encounter Diagnoses   Name Primary?   • 17 weeks gestation of pregnancy Yes   • CHTN- labetalol 50 mg QD, baseline 24 hour urine- 80 mg    Blood pressure noted today.  Syncopal episode may have been vasovagal.  She is on a very low dose of beta-blocker.  I will not increase her beta-blocker at this time.  She has close follow-up for an early 2-hour GTT and we can check her blood pressure at that time.  Call or return if syncopal episodes continue.    3) Reviewed this stage of pregnancy  4) Problem list updated     Return for Next scheduled follow up.      Murphy Wood MD    2020  13:39

## 2020-07-28 ENCOUNTER — HOSPITAL ENCOUNTER (EMERGENCY)
Facility: HOSPITAL | Age: 30
Discharge: HOME | End: 2020-07-28
Attending: EMERGENCY MEDICINE | Admitting: EMERGENCY MEDICINE

## 2020-07-28 VITALS
SYSTOLIC BLOOD PRESSURE: 121 MMHG | TEMPERATURE: 97.8 F | HEART RATE: 100 BPM | RESPIRATION RATE: 16 BRPM | WEIGHT: 214 LBS | BODY MASS INDEX: 31.7 KG/M2 | HEIGHT: 69 IN | OXYGEN SATURATION: 97 % | DIASTOLIC BLOOD PRESSURE: 85 MMHG

## 2020-07-28 DIAGNOSIS — R10.9 ABDOMINAL CRAMPS: Primary | ICD-10-CM

## 2020-07-28 LAB
BILIRUB UR QL STRIP: NEGATIVE
CLARITY UR: CLEAR
COLOR UR: YELLOW
GLUCOSE UR STRIP-MCNC: NEGATIVE MG/DL
HGB UR QL STRIP.AUTO: NEGATIVE
KETONES UR QL STRIP: NEGATIVE
LEUKOCYTE ESTERASE UR QL STRIP.AUTO: NEGATIVE
NITRITE UR QL STRIP: NEGATIVE
PH UR STRIP.AUTO: 7 [PH] (ref 4.5–8)
PROT UR QL STRIP: NEGATIVE
SP GR UR STRIP: 1.02 (ref 1–1.03)
UROBILINOGEN UR QL STRIP: NORMAL

## 2020-07-28 PROCEDURE — 81003 URINALYSIS AUTO W/O SCOPE: CPT | Performed by: EMERGENCY MEDICINE

## 2020-07-28 PROCEDURE — 99282 EMERGENCY DEPT VISIT SF MDM: CPT | Performed by: EMERGENCY MEDICINE

## 2020-07-28 PROCEDURE — 99283 EMERGENCY DEPT VISIT LOW MDM: CPT

## 2020-07-28 NOTE — ED PROVIDER NOTES
Subjective     Abdominal Pain   Pain location:  Suprapubic  Pain quality: cramping    Pain radiates to:  Does not radiate  Pain severity:  Mild  Onset quality:  Gradual  Timing:  Intermittent  Progression:  Waxing and waning  Chronicity:  New  Context comment:  Spont onset  Relieved by:  Nothing  Worsened by:  Nothing  Ineffective treatments:  None tried  Associated symptoms: no constipation, no diarrhea, no dysuria, no fever, no hematuria, no vaginal bleeding, no vaginal discharge and no vomiting        Review of Systems   Constitutional: Negative for fever.   Gastrointestinal: Positive for abdominal pain. Negative for constipation, diarrhea and vomiting.   Genitourinary: Negative for dysuria, hematuria, vaginal bleeding and vaginal discharge.   All other systems reviewed and are negative.      Past Medical History:   Diagnosis Date   • Asthma    • Headache    • Hypertension    • PCOS (polycystic ovarian syndrome) 07/2013       No Known Allergies    Past Surgical History:   Procedure Laterality Date   • ADENOIDECTOMY     • DIAGNOSTIC LAPAROSCOPY N/A 2/20/2020    Procedure: DIAGNOSTIC LAPAROSCOPY;  Surgeon: Imelda Rayo MD;  Location: [facility];  Service: Obstetrics/Gynecology;  Laterality: N/A;   • TONSILLECTOMY  02/2008    adnoids also   • US GUIDED CYST ASPIRATION BREAST N/A 7/8/2020       Family History   Problem Relation Age of Onset   • Hypertension Mother    • Ovarian cancer Mother    • Hypertension Maternal Grandmother    • Diabetes Maternal Grandfather    • Hypertension Paternal Grandmother    • Diabetes Paternal Grandfather    • Deep vein thrombosis Maternal Aunt    • Breast cancer Neg Hx    • Colon cancer Neg Hx    • Pulmonary embolism Neg Hx    • Anesthesia problems Neg Hx        Social History     Socioeconomic History   • Marital status:      Spouse name: Not on file   • Number of children: Not on file   • Years of education: Not on file   • Highest education level: Not on file    Tobacco Use   • Smoking status: Current Every Day Smoker     Packs/day: 0.25     Types: Cigarettes   • Smokeless tobacco: Never Used   • Tobacco comment: states is in the process of quitting   Substance and Sexual Activity   • Alcohol use: Not Currently   • Drug use: No   • Sexual activity: Yes     Partners: Male     Birth control/protection: None           Objective   Physical Exam   Constitutional: She is oriented to person, place, and time. She appears well-developed and well-nourished. No distress.   HENT:   Head: Normocephalic and atraumatic.   Eyes: Pupils are equal, round, and reactive to light. Conjunctivae and EOM are normal.   Neck: Normal range of motion. Neck supple.   Abdominal: Soft. She exhibits no distension. There is no tenderness. There is no guarding.   Musculoskeletal: Normal range of motion. She exhibits no edema or deformity.   Neurological: She is alert and oriented to person, place, and time. No cranial nerve deficit. Coordination normal.   Skin: Skin is warm. No rash noted. She is not diaphoretic. No erythema. No pallor.   Psychiatric: She has a normal mood and affect. Her behavior is normal.   Nursing note and vitals reviewed.      Procedures           ED Course           Reeval, abd s/nt, appears well, vss, ok with plan to f/u OB                                MDM  Number of Diagnoses or Management Options  Abdominal cramps:   Risk of Complications, Morbidity, and/or Mortality  Presenting problems: low  Diagnostic procedures: low  Management options: low    Patient Progress  Patient progress: stable      Final diagnoses:   Abdominal cramps            Ricki Garrett MD  07/28/20 [id number]

## 2020-08-04 ENCOUNTER — HOSPITAL ENCOUNTER (EMERGENCY)
Facility: HOSPITAL | Age: 30
Discharge: HOME | End: 2020-08-04
Attending: EMERGENCY MEDICINE | Admitting: EMERGENCY MEDICINE

## 2020-08-04 ENCOUNTER — ROUTINE PRENATAL (OUTPATIENT)
Dept: OBSTETRICS AND GYNECOLOGY | Facility: CLINIC | Age: 30
End: 2020-08-04

## 2020-08-04 ENCOUNTER — PROCEDURE VISIT (OUTPATIENT)
Dept: OBSTETRICS AND GYNECOLOGY | Facility: CLINIC | Age: 30
End: 2020-08-04

## 2020-08-04 VITALS
WEIGHT: 214 LBS | OXYGEN SATURATION: 99 % | SYSTOLIC BLOOD PRESSURE: 126 MMHG | BODY MASS INDEX: 31.7 KG/M2 | HEIGHT: 69 IN | HEART RATE: 76 BPM | RESPIRATION RATE: 16 BRPM | TEMPERATURE: 99.1 F | DIASTOLIC BLOOD PRESSURE: 77 MMHG

## 2020-08-04 VITALS — DIASTOLIC BLOOD PRESSURE: 72 MMHG | SYSTOLIC BLOOD PRESSURE: 128 MMHG | WEIGHT: 218.6 LBS | BODY MASS INDEX: 32.28 KG/M2

## 2020-08-04 DIAGNOSIS — K21.00 GASTROESOPHAGEAL REFLUX DISEASE WITH ESOPHAGITIS: ICD-10-CM

## 2020-08-04 DIAGNOSIS — R07.9 CHEST PAIN, UNSPECIFIED TYPE: Primary | ICD-10-CM

## 2020-08-04 DIAGNOSIS — Z36.9 ENCOUNTER FOR ANTENATAL SCREENING, UNSPECIFIED: Primary | ICD-10-CM

## 2020-08-04 DIAGNOSIS — Z36.89 ENCOUNTER FOR FETAL ANATOMIC SURVEY: Primary | ICD-10-CM

## 2020-08-04 DIAGNOSIS — K21.9 GASTROESOPHAGEAL REFLUX DISEASE, ESOPHAGITIS PRESENCE NOT SPECIFIED: ICD-10-CM

## 2020-08-04 LAB
ALBUMIN SERPL-MCNC: 3.5 G/DL (ref 3.5–5.2)
ALBUMIN/GLOB SERPL: 1.3 G/DL
ALP SERPL-CCNC: 59 U/L (ref 39–117)
ALT SERPL W P-5'-P-CCNC: 14 U/L (ref 1–33)
ANION GAP SERPL CALCULATED.3IONS-SCNC: 10.1 MMOL/L (ref 5–15)
AST SERPL-CCNC: 11 U/L (ref 1–32)
BASOPHILS # BLD AUTO: 0.05 10*3/MM3 (ref 0–0.2)
BASOPHILS NFR BLD AUTO: 0.4 % (ref 0–1.5)
BILIRUB SERPL-MCNC: <0.2 MG/DL (ref 0–1.2)
BUN SERPL-MCNC: 5 MG/DL (ref 6–20)
BUN/CREAT SERPL: 8.1 (ref 7–25)
CALCIUM SPEC-SCNC: 9.4 MG/DL (ref 8.6–10.5)
CHLORIDE SERPL-SCNC: 104 MMOL/L (ref 98–107)
CO2 SERPL-SCNC: 23.9 MMOL/L (ref 22–29)
CREAT SERPL-MCNC: 0.62 MG/DL (ref 0.57–1)
DEPRECATED RDW RBC AUTO: 48.3 FL (ref 37–54)
EOSINOPHIL # BLD AUTO: 0.26 10*3/MM3 (ref 0–0.4)
EOSINOPHIL NFR BLD AUTO: 2 % (ref 0.3–6.2)
ERYTHROCYTE [DISTWIDTH] IN BLOOD BY AUTOMATED COUNT: 14.5 % (ref 12.3–15.4)
GFR SERPL CREATININE-BSD FRML MDRD: 113 ML/MIN/1.73
GLOBULIN UR ELPH-MCNC: 2.8 GM/DL
GLUCOSE 1H P 75 G GLC PO SERPL-MCNC: 153 MG/DL (ref 65–179)
GLUCOSE 2H P 75 G GLC PO SERPL-MCNC: 139 MG/DL (ref 65–154)
GLUCOSE P FAST SERPL-MCNC: 78 MG/DL (ref 65–94)
GLUCOSE SERPL-MCNC: 85 MG/DL (ref 65–99)
GLUCOSE UR STRIP-MCNC: NEGATIVE MG/DL
HCG SERPL QL: POSITIVE
HCT VFR BLD AUTO: 31.7 % (ref 34–46.6)
HCT VFR BLD AUTO: 33.2 % (ref 34–46.6)
HGB BLD-MCNC: 10.5 G/DL (ref 12–15.9)
HGB BLD-MCNC: 10.7 G/DL (ref 12–15.9)
HOLD SPECIMEN: NORMAL
HOLD SPECIMEN: NORMAL
IMM GRANULOCYTES # BLD AUTO: 0.08 10*3/MM3 (ref 0–0.05)
IMM GRANULOCYTES NFR BLD AUTO: 0.6 % (ref 0–0.5)
LYMPHOCYTES # BLD AUTO: 3.68 10*3/MM3 (ref 0.7–3.1)
LYMPHOCYTES NFR BLD AUTO: 27.7 % (ref 19.6–45.3)
MCH RBC QN AUTO: 28.8 PG (ref 26.6–33)
MCHC RBC AUTO-ENTMCNC: 31.6 G/DL (ref 31.5–35.7)
MCV RBC AUTO: 91.2 FL (ref 79–97)
MONOCYTES # BLD AUTO: 1.02 10*3/MM3 (ref 0.1–0.9)
MONOCYTES NFR BLD AUTO: 7.7 % (ref 5–12)
NEUTROPHILS NFR BLD AUTO: 61.6 % (ref 42.7–76)
NEUTROPHILS NFR BLD AUTO: 8.18 10*3/MM3 (ref 1.7–7)
NRBC BLD AUTO-RTO: 0 /100 WBC (ref 0–0.2)
PLATELET # BLD AUTO: 344 10*3/MM3 (ref 140–450)
PMV BLD AUTO: 9.9 FL (ref 6–12)
POTASSIUM SERPL-SCNC: 3.8 MMOL/L (ref 3.5–5.2)
PROT SERPL-MCNC: 6.3 G/DL (ref 6–8.5)
PROT UR STRIP-MCNC: NEGATIVE MG/DL
RBC # BLD AUTO: 3.64 10*6/MM3 (ref 3.77–5.28)
SODIUM SERPL-SCNC: 138 MMOL/L (ref 136–145)
TROPONIN T SERPL-MCNC: <0.01 NG/ML (ref 0–0.03)
WBC # BLD AUTO: 13.27 10*3/MM3 (ref 3.4–10.8)
WHOLE BLOOD HOLD SPECIMEN: NORMAL
WHOLE BLOOD HOLD SPECIMEN: NORMAL

## 2020-08-04 PROCEDURE — 76805 OB US >/= 14 WKS SNGL FETUS: CPT | Performed by: OBSTETRICS & GYNECOLOGY

## 2020-08-04 PROCEDURE — 0502F SUBSEQUENT PRENATAL CARE: CPT | Performed by: OBSTETRICS & GYNECOLOGY

## 2020-08-04 PROCEDURE — 84703 CHORIONIC GONADOTROPIN ASSAY: CPT | Performed by: EMERGENCY MEDICINE

## 2020-08-04 PROCEDURE — 80053 COMPREHEN METABOLIC PANEL: CPT | Performed by: EMERGENCY MEDICINE

## 2020-08-04 PROCEDURE — 99283 EMERGENCY DEPT VISIT LOW MDM: CPT

## 2020-08-04 PROCEDURE — 84484 ASSAY OF TROPONIN QUANT: CPT | Performed by: EMERGENCY MEDICINE

## 2020-08-04 PROCEDURE — 99284 EMERGENCY DEPT VISIT MOD MDM: CPT | Performed by: EMERGENCY MEDICINE

## 2020-08-04 PROCEDURE — 93005 ELECTROCARDIOGRAM TRACING: CPT | Performed by: EMERGENCY MEDICINE

## 2020-08-04 PROCEDURE — 85025 COMPLETE CBC W/AUTO DIFF WBC: CPT | Performed by: EMERGENCY MEDICINE

## 2020-08-04 PROCEDURE — 93010 ELECTROCARDIOGRAM REPORT: CPT | Performed by: INTERNAL MEDICINE

## 2020-08-04 RX ORDER — SODIUM CHLORIDE 0.9 % (FLUSH) 0.9 %
10 SYRINGE (ML) INJECTION AS NEEDED
Status: DISCONTINUED | OUTPATIENT
Start: 2020-08-04 | End: 2020-08-04 | Stop reason: HOSPADM

## 2020-08-04 NOTE — DISCHARGE INSTRUCTIONS
Gentle diet as tolerated.  Avoid spicy foods and greasy foods.  Take Tylenol as needed for pain every 8-12 hours.  Please return to the emergency room for any worsening pain, fevers, weakness, nausea, vomiting, difficulty breathing or any other concerns.

## 2020-08-04 NOTE — PROGRESS NOTES
Pt here for anatomy scan and early GTT.  Pt has worsening GERD with esophagitis refractory to meds.  Will order GI consult.  U/s wnl.  Pt doing well otherwise.      Problems reviewed with pt:  Patient Active Problem List   Diagnosis   • Pregnancy   • CHTN- labetalol 50 mg QD, baseline 24 hour urine- 80 mg   • Rubella non-immune status, antepartum- MMR pp   • PCOS (polycystic ovarian syndrome)   • Elevated glycosylated hemoglobin- HgBA1c= 5.7, needs early 2 hour GTT at 20  & 28 weeks   • Anemia, unspecified- HgB 11.3 - daily iron   • Solitary cyst of right breast: 5.7cm. Scheduled for drainage   • Gastroesophageal reflux disease with esophagitis

## 2020-08-04 NOTE — ED PROVIDER NOTES
Subjective   History of Present Illness  History of Present Illness    Chief complaint: Chest pain    Location: Underneath the breasts and radiating to the back    Quality/Severity: Mild dull pressure pain    Timing/Duration: Began this evening just a little while ago    Modifying Factors: None    Narrative: This patient presents for evaluation of chest pain symptoms.  She is 20 weeks pregnant.  She was feeling fine when she went to bed.  Her dog woke her up in the middle night.  When she stood up to get up out of bed, she suddenly noticed some chest pressure under her breasts and radiating towards her back.  The pain has persisted now for a couple of hours.  She denies any difficulties breathing.  She denies any nausea or vomiting.  She has a history of GERD and takes Protonix 40 mg every day.  She says this pain felt like previous reflux problems she is experienced in the past however it was not going away.  Now that she is arrived here it is feeling much better.  She is scheduled to have her 20-week OB ultrasound and her glucose tolerance test here and this hospital at 8:30 AM, therefore she has been fasting tonight.    Associated Symptoms: As above    Review of Systems   Constitutional: Negative for activity change, diaphoresis and fever.   HENT: Negative.    Eyes: Negative for pain and visual disturbance.   Respiratory: Negative for cough and shortness of breath.    Cardiovascular: Positive for chest pain.   Gastrointestinal: Negative for abdominal pain and nausea.   Genitourinary: Negative for dysuria and flank pain.   Skin: Negative for color change and rash.   Neurological: Negative for syncope and headaches.   All other systems reviewed and are negative.      Past Medical History:   Diagnosis Date   • Asthma    • Headache    • Hypertension    • PCOS (polycystic ovarian syndrome) 07/2013       No Known Allergies    Past Surgical History:   Procedure Laterality Date   • ADENOIDECTOMY     • DIAGNOSTIC  LAPAROSCOPY N/A 2/20/2020    Procedure: DIAGNOSTIC LAPAROSCOPY;  Surgeon: Imelda Rayo MD;  Location: [facility];  Service: Obstetrics/Gynecology;  Laterality: N/A;   • TONSILLECTOMY  02/2008    adnoids also   • US GUIDED CYST ASPIRATION BREAST N/A 7/8/2020       Family History   Problem Relation Age of Onset   • Hypertension Mother    • Ovarian cancer Mother    • Hypertension Maternal Grandmother    • Diabetes Maternal Grandfather    • Hypertension Paternal Grandmother    • Diabetes Paternal Grandfather    • Deep vein thrombosis Maternal Aunt    • Breast cancer Neg Hx    • Colon cancer Neg Hx    • Pulmonary embolism Neg Hx    • Anesthesia problems Neg Hx        Social History     Socioeconomic History   • Marital status:      Spouse name: Not on file   • Number of children: Not on file   • Years of education: Not on file   • Highest education level: Not on file   Tobacco Use   • Smoking status: Current Every Day Smoker     Packs/day: 0.25     Types: Cigarettes   • Smokeless tobacco: Never Used   • Tobacco comment: states is in the process of quitting   Substance and Sexual Activity   • Alcohol use: Not Currently   • Drug use: No   • Sexual activity: Yes     Partners: Male     Birth control/protection: None     ED Triage Vitals [08/04/20 0423]   Temp Heart Rate Resp BP SpO2   99.1 °F (37.3 °C) 76 18 130/80 100 %      Temp src Heart Rate Source Patient Position BP Location FiO2 (%)   Oral Monitor Lying Right arm --     Objective   Physical Exam   Constitutional: She is oriented to person, place, and time. She appears well-developed and well-nourished.  Non-toxic appearance. She does not appear ill. No distress.   HENT:   Head: Normocephalic and atraumatic.   Eyes: Pupils are equal, round, and reactive to light. EOM are normal. Right eye exhibits no discharge. Left eye exhibits no discharge.   Neck: Normal range of motion. Neck supple.   Cardiovascular: Normal rate, regular rhythm, intact distal  pulses and normal pulses.   No murmur heard.  Pulmonary/Chest: Effort normal and breath sounds normal. No accessory muscle usage. No respiratory distress. She has no decreased breath sounds. She has no wheezes. She has no rhonchi. She has no rales.   Musculoskeletal: Normal range of motion. She exhibits no edema or deformity.        Right lower leg: Normal. She exhibits no tenderness and no edema.        Left lower leg: Normal. She exhibits no tenderness and no edema.   No edema or asymmetery at all     Neurological: She is alert and oriented to person, place, and time. She is not disoriented.   Skin: Skin is warm and dry. No rash noted. She is not diaphoretic. No erythema. No pallor. Nails show no clubbing.   Psychiatric: She has a normal mood and affect. Her behavior is normal. Judgment and thought content normal. Her mood appears not anxious. She is not agitated.   Nursing note and vitals reviewed.      EKG           EKG time/Interp time: 0423/0425  Rhythm/Rate: Sinus rhythm, 76 bpm  P waves and AZ: P waves are present, 124 ms  QRS, axis: 86 ms, normal axis  ST and T waves: No acute ischemic changes notable    Independently interpreted by me contemporaneously with treatment    Results for orders placed or performed during the hospital encounter of 08/04/20   Comprehensive Metabolic Panel   Result Value Ref Range    Glucose 85 65 - 99 mg/dL    BUN 5 (L) 6 - 20 mg/dL    Creatinine 0.62 0.57 - 1.00 mg/dL    Sodium 138 136 - 145 mmol/L    Potassium 3.8 3.5 - 5.2 mmol/L    Chloride 104 98 - 107 mmol/L    CO2 23.9 22.0 - 29.0 mmol/L    Calcium 9.4 8.6 - 10.5 mg/dL    Total Protein 6.3 6.0 - 8.5 g/dL    Albumin 3.50 3.50 - 5.20 g/dL    ALT (SGPT) 14 1 - 33 U/L    AST (SGOT) 11 1 - 32 U/L    Alkaline Phosphatase 59 39 - 117 U/L    Total Bilirubin <0.2 0.0 - 1.2 mg/dL    eGFR Non African Amer 113 >60 mL/min/1.73    Globulin 2.8 gm/dL    A/G Ratio 1.3 g/dL    BUN/Creatinine Ratio 8.1 7.0 - 25.0    Anion Gap 10.1 5.0 - 15.0  mmol/L   Troponin   Result Value Ref Range    Troponin T <0.010 0.000 - 0.030 ng/mL   hCG, Serum, Qualitative   Result Value Ref Range    HCG Qualitative Positive (A) Negative   CBC Auto Differential   Result Value Ref Range    WBC 13.27 (H) 3.40 - 10.80 10*3/mm3    RBC 3.64 (L) 3.77 - 5.28 10*6/mm3    Hemoglobin 10.5 (L) 12.0 - 15.9 g/dL    Hematocrit 33.2 (L) 34.0 - 46.6 %    MCV 91.2 79.0 - 97.0 fL    MCH 28.8 26.6 - 33.0 pg    MCHC 31.6 31.5 - 35.7 g/dL    RDW 14.5 12.3 - 15.4 %    RDW-SD 48.3 37.0 - 54.0 fl    MPV 9.9 6.0 - 12.0 fL    Platelets 344 140 - 450 10*3/mm3    Neutrophil % 61.6 42.7 - 76.0 %    Lymphocyte % 27.7 19.6 - 45.3 %    Monocyte % 7.7 5.0 - 12.0 %    Eosinophil % 2.0 0.3 - 6.2 %    Basophil % 0.4 0.0 - 1.5 %    Immature Grans % 0.6 (H) 0.0 - 0.5 %    Neutrophils, Absolute 8.18 (H) 1.70 - 7.00 10*3/mm3    Lymphocytes, Absolute 3.68 (H) 0.70 - 3.10 10*3/mm3    Monocytes, Absolute 1.02 (H) 0.10 - 0.90 10*3/mm3    Eosinophils, Absolute 0.26 0.00 - 0.40 10*3/mm3    Basophils, Absolute 0.05 0.00 - 0.20 10*3/mm3    Immature Grans, Absolute 0.08 (H) 0.00 - 0.05 10*3/mm3    nRBC 0.0 0.0 - 0.2 /100 WBC   Light Blue Top   Result Value Ref Range    Extra Tube hold for add-on    Green Top (Gel)   Result Value Ref Range    Extra Tube Hold for add-ons.    Lavender Top   Result Value Ref Range    Extra Tube hold for add-on    Gold Top - SST   Result Value Ref Range    Extra Tube Hold for add-ons.        Procedures           ED Course  ED Course as of Aug 04 0722   Tue Aug 04, 2020   0721 I have reviewed the labs from today's visit as well as the EKG.  All findings here are quite reassuring.  Patient's troponin is negative despite pain greater than 2 hours.  Her presentation is really atypical for cardiac or pulmonary chest pain and her physical exam is not worrisome.  She was already feeling better by the time she arrived here and continued to feel better as time progressed in the department.  I did not  "order chest x-ray since she is pregnant.  I felt there was no need clinically to continue observation or work-up after she was feeling better and her symptoms have pretty much resolved.  She has a follow-up appointment with OB in a few hours this morning and I advised her to keep that appointment as scheduled.  I reviewed with her the usual \"return to ER\" instructions for any worsening signs or symptoms and she was discharged home in good condition after that.    [VANESA]      ED Course User Index  [VANESA] Aleksander Corrigan MD                                         HEART Score (for prediction of 6-week risk of major adverse cardiac event) reviewed and/or performed as part of the patient evaluation and treatment planning process.  The result associated with this review/performance is: 1       MDM  Number of Diagnoses or Management Options  Chest pain, unspecified type:   Gastroesophageal reflux disease, esophagitis presence not specified:      Amount and/or Complexity of Data Reviewed  Clinical lab tests: reviewed and ordered  Decide to obtain previous medical records or to obtain history from someone other than the patient: yes  Review and summarize past medical records: yes  Independent visualization of images, tracings, or specimens: yes    Risk of Complications, Morbidity, and/or Mortality  Presenting problems: moderate  Diagnostic procedures: moderate  Management options: moderate        Final diagnoses:   Chest pain, unspecified type   Gastroesophageal reflux disease, esophagitis presence not specified            Aleksander Corrigan MD  08/04/20 0722    "

## 2020-08-10 ENCOUNTER — TELEPHONE (OUTPATIENT)
Dept: OBSTETRICS AND GYNECOLOGY | Facility: CLINIC | Age: 30
End: 2020-08-10

## 2020-08-10 NOTE — TELEPHONE ENCOUNTER
Based on the patients 2 hr gtt results you recommended for the patient to increase her iron to 3x daily, pt called stating since starting her iron 3x daily it is making her get sick, she wants to know if she has another option?

## 2020-08-10 NOTE — TELEPHONE ENCOUNTER
Not really.  She can cut back to 2x daily but she might remain anemic.  Otherwise, she needs to increase her iron-rich foods (she can google that) and we will recheck her iron in about 4 weeks.

## 2020-08-13 ENCOUNTER — HOSPITAL ENCOUNTER (OUTPATIENT)
Facility: HOSPITAL | Age: 30
Discharge: HOME | End: 2020-08-13
Attending: OBSTETRICS & GYNECOLOGY | Admitting: OBSTETRICS & GYNECOLOGY

## 2020-08-13 VITALS
RESPIRATION RATE: 18 BRPM | TEMPERATURE: 98.5 F | DIASTOLIC BLOOD PRESSURE: 58 MMHG | HEART RATE: 80 BPM | SYSTOLIC BLOOD PRESSURE: 111 MMHG | OXYGEN SATURATION: 99 %

## 2020-08-13 LAB
AMPHET+METHAMPHET UR QL: NEGATIVE
AMPHETAMINES UR QL: NEGATIVE
BARBITURATES UR QL SCN: NEGATIVE
BENZODIAZ UR QL SCN: NEGATIVE
BILIRUB UR QL STRIP: NEGATIVE
BUPRENORPHINE SERPL-MCNC: NEGATIVE NG/ML
CANNABINOIDS SERPL QL: NEGATIVE
CLARITY UR: CLEAR
COCAINE UR QL: NEGATIVE
COLOR UR: YELLOW
GLUCOSE UR STRIP-MCNC: NEGATIVE MG/DL
HGB UR QL STRIP.AUTO: NEGATIVE
KETONES UR QL STRIP: NEGATIVE
LEUKOCYTE ESTERASE UR QL STRIP.AUTO: NEGATIVE
METHADONE UR QL SCN: NEGATIVE
NITRITE UR QL STRIP: NEGATIVE
OPIATES UR QL: NEGATIVE
OXYCODONE UR QL SCN: NEGATIVE
PCP UR QL SCN: NEGATIVE
PH UR STRIP.AUTO: 7 [PH] (ref 4.5–8)
PROPOXYPH UR QL: NEGATIVE
PROT UR QL STRIP: NEGATIVE
SP GR UR STRIP: 1.02 (ref 1–1.03)
TRICYCLICS UR QL SCN: NEGATIVE
UROBILINOGEN UR QL STRIP: NORMAL

## 2020-08-13 PROCEDURE — 99213 OFFICE O/P EST LOW 20 MIN: CPT | Performed by: OBSTETRICS & GYNECOLOGY

## 2020-08-13 PROCEDURE — 81003 URINALYSIS AUTO W/O SCOPE: CPT | Performed by: OBSTETRICS & GYNECOLOGY

## 2020-08-13 PROCEDURE — G0463 HOSPITAL OUTPT CLINIC VISIT: HCPCS

## 2020-08-13 PROCEDURE — 80306 DRUG TEST PRSMV INSTRMNT: CPT | Performed by: OBSTETRICS & GYNECOLOGY

## 2020-08-13 PROCEDURE — 59025 FETAL NON-STRESS TEST: CPT | Performed by: OBSTETRICS & GYNECOLOGY

## 2020-08-14 NOTE — NON STRESS TEST
Es [place], a  at 21w2d with an DAFNE of 2020, by Ultrasound, was seen at [facility] for a nonstress test.    Chief Complaint   Patient presents with   • Headache     right sided pain       Patient Active Problem List   Diagnosis   • Pregnancy   • CHTN- labetalol 50 mg QD, baseline 24 hour urine- 80 mg   • Rubella non-immune status, antepartum- MMR pp   • PCOS (polycystic ovarian syndrome)   • Elevated glycosylated hemoglobin- HgBA1c= 5.7, needs early 2 hour GTT at 20  & 28 weeks   • Anemia, unspecified- HgB 11.3 - daily iron   • Solitary cyst of right breast: 5.7cm. Scheduled for drainage   • Gastroesophageal reflux disease with esophagitis       Start Time:   Stop Time: 16    Interpretation A  Comments A: Patient 21 weeks 2 days gestation, fetal heart tones noted in 150's, no continous monitoring provided (20 : , Clementina TORRES, RN)    Patient Care Team:  Rosetta Adams MD as PCP - General (Family Medicine)    Patient new to practice? No  Patient new to examiner? No  Patient referred? No  -----------------------------------------------------HISTORY---------------------------------------------------    Chief Complaint:   Chief Complaint   Patient presents with   • Headache     right sided pain     New problem to examiner? Yes    30 y.o.  Patient's last menstrual period was 2020 (exact date).    HPI: History of Present Illness      HPI: pt here for a headache and concerns that her blood pressure might be elevated.   Pt also complains of R sided pain.      Pt denies fever, reports fetal movement, denies postprandial nausea and vomiting, bleeding or UTI symptoms.     The headache started tonight and she called María Elena Arthur who suggested she come in for evaluation.    PFSH:   1.    Past Medical History:   Diagnosis Date   • Asthma    • Headache    • Hypertension    • PCOS (polycystic ovarian syndrome) 2013     2.   Family History   Problem  Relation Age of Onset   • Hypertension Mother    • Ovarian cancer Mother    • Hypertension Maternal Grandmother    • Diabetes Maternal Grandfather    • Hypertension Paternal Grandmother    • Diabetes Paternal Grandfather    • Deep vein thrombosis Maternal Aunt    • Breast cancer Neg Hx    • Colon cancer Neg Hx    • Pulmonary embolism Neg Hx    • Anesthesia problems Neg Hx      3. Social History: :  [unfilled]  Employment/occupation:  Yes   Smoker: No   Alcohol: No  Recreational drugs: No    PAST HISTORY REVIEWED:  1.   Past Surgical History:   Procedure Laterality Date   • ADENOIDECTOMY     • DIAGNOSTIC LAPAROSCOPY N/A 2/20/2020    Procedure: DIAGNOSTIC LAPAROSCOPY;  Surgeon: Imelda Rayo MD;  Location: [facility];  Service: Obstetrics/Gynecology;  Laterality: N/A;   • TONSILLECTOMY  02/2008    adnoids also   • US GUIDED CYST ASPIRATION BREAST N/A 7/8/2020      2. No current facility-administered medications for this encounter.     Current Outpatient Medications:   •  ferrous gluconate (FERGON) 324 MG tablet, Take 1 tablet by mouth Daily With Breakfast., Disp: 30 tablet, Rfl: 6  •  labetalol (NORMODYNE) 100 MG tablet, One half of tablet by mouth daily (Patient taking differently: Take 50 mg by mouth. One half of tablet by mouth daily), Disp: 30 tablet, Rfl: 6  •  pantoprazole (PROTONIX) 40 MG EC tablet, Take 1 tablet by mouth Daily., Disp: 30 tablet, Rfl: 6  •  Prenat-Fe Carbonyl-FA-Omega 3 (ONE-A-DAY WOMENS PRENATAL 1) 28-0.8-235 MG capsule, Take 1 capsule by mouth Daily., Disp: , Rfl:   •  Prenatal Vit-Fe Fumarate-FA (PRENATAL, CLASSIC, VITAMIN) 28-0.8 MG tablet tablet, Take  by mouth Daily., Disp: , Rfl:   3. No Known Allergies    ROS:  Review of Systems   Constitutional: Negative.    HENT: Negative.    Respiratory: Negative.    Cardiovascular: Negative.    Gastrointestinal: neg  Endocrine: Negative.    Genitourinary: Negative  Musculoskeletal: Negative.    Skin: Negative.     Allergic/Immunologic: Negative.    Neurological: Negative.    Hematological: Negative.    Psychiatric/Behavioral: Negative.      -----------------------------------------------PHYSICAL EXAM----------------------------------------------    Vital Signs: /58   Pulse 80   Temp 98.5 °F (36.9 °C) (Oral)   Resp 18   LMP 03/17/2020 (Exact Date)   SpO2 99%      Physical Exam:  Physical Exam   Constitutional: She is oriented to person, place, and time.   She appears well-developed and well-nourished.   HENT:   Head: Normocephalic and atraumatic.   Eyes: EOM are normal.   Neck: Normal range of motion.   Cardiovascular: Normal rate.   Pulmonary/Chest: Effort normal.   Abdominal: Soft. She exhibits no distension and no mass.   There is no tenderness.   There is no guarding.   Genitourinary: No vaginal discharge found.   Cervix checked: no  Musculoskeletal: Normal range of motion. She exhibits no edema,   tenderness or deformity.   Neurological: She is alert and oriented to person, place, and time.   Skin: Skin is warm and dry. No rash noted. No erythema. No pallor.   Psychiatric: She has a normal mood and affect. Her behavior is normal.   Judgment and thought content normal.     -----------------------------------------------MEDICAL DECISION MAKING-----------------------------      DATA Review & labs ordered:     1.   Lab Results (last 24 hours)     Procedure Component Value Units Date/Time    Urine Drug Screen - Urine, Clean Catch [[id number]]  (Normal) Collected:  08/13/20 2200    Specimen:  Urine, Clean Catch Updated:  08/13/20 2304     THC, Screen, Urine Negative     Phencyclidine (PCP), Urine Negative     Cocaine Screen, Urine Negative     Methamphetamine, Ur Negative     Opiate Screen Negative     Amphetamine Screen, Urine Negative     Benzodiazepine Screen, Urine Negative     Tricyclic Antidepressants Screen Negative     Methadone Screen, Urine Negative     Barbiturates Screen, Urine Negative     Oxycodone  Screen, Urine Negative     Propoxyphene Screen Negative     Buprenorphine, Screen, Urine Negative    Narrative:       Urine drug screen results are to be used for medical purposes only.  They are not to be used for legal purposes such as employment testing.  Negative results do not necessarily mean the complete absence of a subtance, but rather that the result is less than the cutoff for that substance.  Positive results are unconfirmed and considered Preliminary Positive.  [facility] does not automatically confirm Postitive Unconfirmed results.  The physician may request (order) an Unconfirmed Positive result to be sent out for confirmation.      Negative Thresholds for Drugs Screened:    THC screen, urine                          50 ng/ml  Phenycyclidine (PCP), urine                25 ng/ml  Cocaine screen, urine                     150 ng/ml  Methamphetamine, urine                    500 ng/ml  Opiate screen, urine                      100 ng/ml  Amphetamine screen, urine                 500 ng/ml  Benzodiazepine screen, urine              150 ng/ml  Tricyclic Antidepressants screen, urine   300 ng/ml  Methadone screen, urine                   200 ng/ml  Barbiturates screen, urine                200 ng/ml  Oxycodone screen, urine                   100 ng/ml  Propoxyphene screen, urine                300 ng/ml  Buprenorphine screen, urine                10 ng/ml    Urinalysis With Culture If Indicated - Urine, Clean Catch [[id number]]  (Normal) Collected:  08/13/20 2200    Specimen:  Urine, Clean Catch Updated:  08/13/20 2252     Color, UA Yellow     Appearance, UA Clear     pH, UA 7.0     Specific Gravity, UA 1.020     Glucose, UA Negative     Ketones, UA Negative     Bilirubin, UA Negative     Blood, UA Negative     Protein, UA Negative     Leuk Esterase, UA Negative     Nitrite, UA Negative     Urobilinogen, UA 0.2 E.U./dL    Narrative:       Urine microscopic not indicated.        2.   Imaging  Results (Last 24 Hours)     ** No results found for the last 24 hours. **        3.   ECG/EMG Results (most recent)     None        4. Old records reviewed? Yes  5. Old records ordered?  No  6. Labs ordered?: Yes:  drug screen and urinalysis  7. Imaging other than ultrasound ordered?: No  8. Diagnoses and/or chronic conditions reviewed with pt:       [unfilled]  9. Risk counseling done:  yes  10. Ultrasound ordered and reviewed?   No    NST INTERPRETATION:    Indication for test:   Chief Complaint   Patient presents with   • Headache     right sided pain     Interpretation: + FHTs noted, no contractions  Fetal Heart Rate Baseline: 150's  Periodic Changes: absent  Contractions present? no  NST duration:  Over 20 minutes....              IMPRESSION:  30 y.o.  @ 21wks                             No PIH indentified.    Patient Active Problem List   Diagnosis   • Pregnancy   • CHTN- labetalol 50 mg QD, baseline 24 hour urine- 80 mg   • Rubella non-immune status, antepartum- MMR pp   • PCOS (polycystic ovarian syndrome)   • Elevated glycosylated hemoglobin- HgBA1c= 5.7, needs early 2 hour GTT at 20  & 28 weeks   • Anemia, unspecified- HgB 11.3 - daily iron   • Solitary cyst of right breast: 5.7cm. Scheduled for drainage   • Gastroesophageal reflux disease with esophagitis       Established problem/s? No   Worsening? No  New Problem/s? Yes   Additional workup planned? No    PLAN: headache resolved, pt reassured. dc'd home     Resume normal activity as tolerated.    Please keep your next regularly scheduled appointment.    Call your doctor if you notice: increased leakage or fluid from the vagina, contractions more than 10 per 1 hour, decreased fetal movement, low back pain or cramping that doesn't go away, bleeding from vaginal area, difficulty peeing, pain with peeing, difficulty breathing, new calf pain, headache that doesn't go away or vision change.    Normal diet as tolerated.    Perform a kick count once a  day at approximately the same time each day. Baby's activity levels are usually higher in the evening after dinner.To perform a kick count, lie on your left side and focus on your baby's movements: rolls, kicks, or flutters. Record the number of minutes it takes to feel your baby move ten times. You may stop counting after your baby has moved 5 times.    If your baby does not move at least 5 times in 1 hr or if there is a sudden decrease in movement, call the office ([phone])      ASSESSMENT:      PLAN: RETURN TO OFFICE AS SCHEDULED, CALL FOR PROBLEMS    Time:  Total face-to-face/floor time 35 min.  Time spent in counseling 30 min with in person, on site NST review with pt.  Counseling included the following topics with prognosis, differential diagnosis, risks, benefits of treatment, instructions, complicance and/or risk reduction and alternatives: preeclampsia, headache significance in pregnancy.         Levon Marcos MD  8/17/2020  14:16

## 2020-08-14 NOTE — NURSING NOTE
Dr. Marcos reviewed patients urine results, results negative.  Blood pressures WNL. Swelling in feet, ankles and legs noted.  Fetal heart tones noted in 150's.  MD stated that patient can be discharged home for self-care.  Instructions were given and education provided to patient.  Patient acknowledged she understood information.

## 2020-08-24 ENCOUNTER — TELEPHONE (OUTPATIENT)
Dept: OBSTETRICS AND GYNECOLOGY | Facility: CLINIC | Age: 30
End: 2020-08-24

## 2020-08-24 NOTE — TELEPHONE ENCOUNTER
Pt called and stated she had a guided us with cysts removal on her breast on July . Now she states that she has an indention on the opposite side big enough to place her finger in it. She wants to know if that's normal or does she need to be seen?

## 2020-08-25 ENCOUNTER — OFFICE VISIT (OUTPATIENT)
Dept: GASTROENTEROLOGY | Facility: CLINIC | Age: 30
End: 2020-08-25

## 2020-08-25 VITALS — BODY MASS INDEX: 32.53 KG/M2 | WEIGHT: 219.6 LBS | HEIGHT: 69 IN | TEMPERATURE: 98.6 F

## 2020-08-25 DIAGNOSIS — K21.9 GASTROESOPHAGEAL REFLUX DISEASE, ESOPHAGITIS PRESENCE NOT SPECIFIED: Primary | ICD-10-CM

## 2020-08-25 PROCEDURE — 99203 OFFICE O/P NEW LOW 30 MIN: CPT | Performed by: INTERNAL MEDICINE

## 2020-08-25 RX ORDER — SUCRALFATE 1 G/1
1 TABLET ORAL 4 TIMES DAILY
Qty: 120 TABLET | Refills: 5 | Status: SHIPPED | OUTPATIENT
Start: 2020-08-25 | End: 2021-01-07

## 2020-08-25 NOTE — PROGRESS NOTES
Chief Complaint   Patient presents with   • Heartburn     Es Branch is a 30 y.o. female who presents with a history of breakthrough heartburn  HPI     Patient 30-year-old female with history of hypertension, asthma and polycystic ovarian syndrome who is approximately 23 weeks pregnant complaining of reflux.  Patient seen for atypical chest pain pressure consistent with her reflux in the past.  Patient having symptoms despite the Protonix which usually gives her relief here for further recommendations.  Patient denies dysphagia but does have regurgitation particularly at night when she lies down.  Patient is tried multiple pillows without improvement.    Past Medical History:   Diagnosis Date   • Asthma    • Headache    • Hypertension    • PCOS (polycystic ovarian syndrome) 07/2013       Current Outpatient Medications:   •  ferrous gluconate (FERGON) 324 MG tablet, Take 1 tablet by mouth Daily With Breakfast. (Patient taking differently: Take 324 mg by mouth 3 (Three) Times a Day.), Disp: 30 tablet, Rfl: 6  •  labetalol (NORMODYNE) 100 MG tablet, One half of tablet by mouth daily (Patient taking differently: Take 50 mg by mouth. One half of tablet by mouth daily), Disp: 30 tablet, Rfl: 6  •  pantoprazole (PROTONIX) 40 MG EC tablet, Take 1 tablet by mouth Daily., Disp: 30 tablet, Rfl: 6  •  Prenat-Fe Carbonyl-FA-Omega 3 (ONE-A-DAY WOMENS PRENATAL 1) 28-0.8-235 MG capsule, Take 1 capsule by mouth Daily., Disp: , Rfl:   •  Prenatal Vit-Fe Fumarate-FA (PRENATAL, CLASSIC, VITAMIN) 28-0.8 MG tablet tablet, Take  by mouth Daily., Disp: , Rfl:   •  sucralfate (CARAFATE) 1 g tablet, Take 1 tablet by mouth 4 (Four) Times a Day. Dissolve in 10 cc water, Disp: 120 tablet, Rfl: 5  No Known Allergies  Social History     Socioeconomic History   • Marital status:      Spouse name: Not on file   • Number of children: Not on file   • Years of education: Not on file   • Highest education level: Not on file   Tobacco Use    • Smoking status: Former Smoker     Types: Cigarettes     Last attempt to quit: 2020     Years since quittin.2   • Smokeless tobacco: Never Used   • Tobacco comment: states is in the process of quitting   Substance and Sexual Activity   • Alcohol use: Not Currently   • Drug use: No   • Sexual activity: Yes     Partners: Male     Birth control/protection: None     Family History   Problem Relation Age of Onset   • Hypertension Mother    • Ovarian cancer Mother    • Hypertension Maternal Grandmother    • Diabetes Maternal Grandfather    • Hypertension Paternal Grandmother    • Diabetes Paternal Grandfather    • Deep vein thrombosis Maternal Aunt    • Breast cancer Neg Hx    • Colon cancer Neg Hx    • Pulmonary embolism Neg Hx    • Anesthesia problems Neg Hx      Review of Systems   Constitutional: Negative.    HENT: Negative.    Eyes: Negative.    Respiratory: Negative.    Cardiovascular: Negative.    Gastrointestinal: Negative.    Endocrine: Negative.    Musculoskeletal: Negative.    Skin: Negative.    Allergic/Immunologic: Negative.    Hematological: Negative.      Vitals:    20 1122   Temp: 98.6 °F (37 °C)     Physical Exam   Constitutional: She is oriented to person, place, and time. She appears well-developed and well-nourished.   HENT:   Head: Normocephalic and atraumatic.   Eyes: Pupils are equal, round, and reactive to light. No scleral icterus.   Neck: Normal range of motion.   Cardiovascular: Normal rate, regular rhythm and normal heart sounds. Exam reveals no gallop and no friction rub.   No murmur heard.  Pulmonary/Chest: Effort normal and breath sounds normal. She has no wheezes. She has no rales.   Abdominal: Soft. Bowel sounds are normal. She exhibits no shifting dullness, no distension, no pulsatile liver, no fluid wave, no abdominal bruit, no ascites, no pulsatile midline mass and no mass. There is no hepatosplenomegaly. There is no tenderness. There is no rigidity and no guarding. No  hernia.   Musculoskeletal: Normal range of motion. She exhibits no edema.   Lymphadenopathy:     She has no cervical adenopathy.   Neurological: She is alert and oriented to person, place, and time. No cranial nerve deficit.   Skin: Skin is warm and dry. No rash noted.   Psychiatric: She has a normal mood and affect. Her behavior is normal.   Nursing note and vitals reviewed.    Diagnoses and all orders for this visit:    Gastroesophageal reflux disease, esophagitis presence not specified    Other orders  -     sucralfate (CARAFATE) 1 g tablet; Take 1 tablet by mouth 4 (Four) Times a Day. Dissolve in 10 cc water    Patient 30-year-old female approximately 23 weeks pregnant with history of polycystic ovarian syndrome, hypertension and asthma reports history of heartburn and reflux.  Patient reports development of worsening symptoms since her pregnancy began.  Patient evaluated for atypical chest pain similar to her reflux symptoms but no longer being controlled by her Protonix.  Patient has been on Protonix for quite some time without issue.  Patient denies dysphagia, no weight loss no hematemesis or melena noted.  As patient second trimester pregnancy would not recommend endoscopic evaluation but would recommend trial of Carafate 1 g 4 times daily and follow clinical response.

## 2020-08-31 ENCOUNTER — ROUTINE PRENATAL (OUTPATIENT)
Dept: OBSTETRICS AND GYNECOLOGY | Facility: CLINIC | Age: 30
End: 2020-08-31

## 2020-08-31 VITALS — WEIGHT: 219.5 LBS | DIASTOLIC BLOOD PRESSURE: 70 MMHG | BODY MASS INDEX: 32.41 KG/M2 | SYSTOLIC BLOOD PRESSURE: 118 MMHG

## 2020-08-31 DIAGNOSIS — O26.849 UTERINE SIZE DATE DISCREPANCY PREGNANCY: ICD-10-CM

## 2020-08-31 DIAGNOSIS — O10.011 PRE-EXISTING ESSENTIAL HYPERTENSION DURING PREGNANCY IN FIRST TRIMESTER: ICD-10-CM

## 2020-08-31 DIAGNOSIS — D64.9 ANEMIA, UNSPECIFIED TYPE: ICD-10-CM

## 2020-08-31 DIAGNOSIS — R73.09 ELEVATED GLYCOSYLATED HEMOGLOBIN: ICD-10-CM

## 2020-08-31 DIAGNOSIS — Z3A.23 23 WEEKS GESTATION OF PREGNANCY: Primary | ICD-10-CM

## 2020-08-31 LAB
GLUCOSE UR STRIP-MCNC: NEGATIVE MG/DL
HCT VFR BLD AUTO: 32.6 % (ref 34–46.6)
HGB BLD-MCNC: 10.6 G/DL (ref 12–15.9)
PROT UR STRIP-MCNC: NEGATIVE MG/DL

## 2020-08-31 PROCEDURE — 0502F SUBSEQUENT PRENATAL CARE: CPT | Performed by: NURSE PRACTITIONER

## 2020-08-31 NOTE — PROGRESS NOTES
OB follow up > 20 weeks    Chief Complaint   Patient presents with   • Routine Prenatal Visit       Es Branch is a 30 y.o.  23w6d being seen today for her obstetrical visit.  Patient reports no complaints. She has increased her iron to 2 or 3 times per day. She was recently started on Carafate by her GI for heartburn.  Taking prenatal vitamins: Yes      Review of Systems  Constitutional: neg fatigue  Cardiovascular: neg edema  Gastrointestinal: neg n/v  Genitourinary: Negative for contractions, cramping, vaginal bleeding, or SROM.   Fetal movement: normal    /70   Wt 99.6 kg (219 lb 8 oz)   LMP 2020 (Exact Date)   BMI 32.41 kg/m²     FHT: present BPM   Uterine Size: size greater than dates       Assessment    1) pregnancy at 23w6d     2) CHTN- Baseline 24-hour urine was 80 mg.  Pt taking Labetalol 50mg daily    3) RNI-Offer MMR vaccine postpartum     4) Anemia- Hgb 10.5g/dL. Taking iron two or three times per day. Check Hgb today.     5) Elevated HgbA1C and PCOS- Hgb A1C 5.7. Passed early 2hr GTT. Recheck at 28 weeks.     6) Right breast cyst: S/P cyst drainage. Reports she is doing well.     7) Former smoker- Quit!     8) Heartburn- Taking Carafate and is followed by GI.     9) S>D- Check growth US next visit     Plan      Continue prenatal vitamins  Reviewed this stage of pregnancy  Problem list updated   Follow up in 4 weeks for OB tummy and 2hr GTT    NORMAN Dubon  2020  11:54

## 2020-09-01 LAB
HGB A MFR BLD: 97.6 % (ref 96.4–98.8)
HGB A2 MFR BLD COLUMN CHROM: 2.4 % (ref 1.8–3.2)
HGB C MFR BLD: 0 %
HGB F MFR BLD: 0 % (ref 0–2)
HGB FRACT BLD-IMP: NORMAL
HGB S BLD QL SOLY: NEGATIVE
HGB S MFR BLD: 0 %

## 2020-09-08 ENCOUNTER — ROUTINE PRENATAL (OUTPATIENT)
Dept: OBSTETRICS AND GYNECOLOGY | Facility: CLINIC | Age: 30
End: 2020-09-08

## 2020-09-08 ENCOUNTER — PROCEDURE VISIT (OUTPATIENT)
Dept: OBSTETRICS AND GYNECOLOGY | Facility: CLINIC | Age: 30
End: 2020-09-08

## 2020-09-08 VITALS — WEIGHT: 223.6 LBS | BODY MASS INDEX: 33.02 KG/M2 | SYSTOLIC BLOOD PRESSURE: 120 MMHG | DIASTOLIC BLOOD PRESSURE: 80 MMHG

## 2020-09-08 DIAGNOSIS — D64.9 ANEMIA, UNSPECIFIED TYPE: ICD-10-CM

## 2020-09-08 DIAGNOSIS — Z34.92 NORMAL PREGNANCY IN SECOND TRIMESTER: Primary | ICD-10-CM

## 2020-09-08 DIAGNOSIS — O46.90 VAGINAL BLEEDING IN PREGNANCY: ICD-10-CM

## 2020-09-08 DIAGNOSIS — O10.011 PRE-EXISTING ESSENTIAL HYPERTENSION DURING PREGNANCY IN FIRST TRIMESTER: ICD-10-CM

## 2020-09-08 DIAGNOSIS — K21.00 GASTROESOPHAGEAL REFLUX DISEASE WITH ESOPHAGITIS: ICD-10-CM

## 2020-09-08 DIAGNOSIS — O26.852 SPOTTING AFFECTING PREGNANCY IN SECOND TRIMESTER: Primary | ICD-10-CM

## 2020-09-08 LAB
GLUCOSE UR STRIP-MCNC: NEGATIVE MG/DL
PROT UR STRIP-MCNC: NEGATIVE MG/DL

## 2020-09-08 PROCEDURE — 76817 TRANSVAGINAL US OBSTETRIC: CPT | Performed by: OBSTETRICS & GYNECOLOGY

## 2020-09-08 PROCEDURE — 76816 OB US FOLLOW-UP PER FETUS: CPT | Performed by: OBSTETRICS & GYNECOLOGY

## 2020-09-08 PROCEDURE — 0502F SUBSEQUENT PRENATAL CARE: CPT | Performed by: OBSTETRICS & GYNECOLOGY

## 2020-09-08 NOTE — PROGRESS NOTES
OB follow up     Es Branch is a 30 y.o.  25w0d being seen today for her obstetrical visit.  Patient reports no contractions and no leaking. Fetal movement: normal.  Patient had 2 episodes of bleeding last night after intercourse.  One was around 11 PM and the second was around 3 AM this morning.  She denies any cramping or other signs of labor.  She presents today for an ultrasound and follow-up.    Review of Systems  No bleeding, No cramping/contractions     /80   Wt 101 kg (223 lb 9.6 oz)   LMP 2020 (Exact Date)   BMI 33.02 kg/m²     FHT: present BPM   Uterine Size: 25 cm   Live viable mireles male fetus in the vertex position.  Growth is in the 53 percentile cervical length is 4 cm.  JULIAN is normal.    Assessment/Plan:    1) 30 y.o.  -pregnancy at 25w0d    2)   Encounter Diagnoses   Name Primary?   • Normal pregnancy in second trimester Yes   • Anemia, unspecified type    • Gastroesophageal reflux disease with esophagitis    • CHTN- labetalol 50 mg QD, baseline 24 hour urine- 80 mg    • Vaginal bleeding in pregnancy    Bleeding likely caused by intercourse.  Normal cervical length.  Continue labetalol, iron and medicine for GERD.  Keep scheduled appointment and follow-up.    3) Reviewed this stage of pregnancy  4) Problem list updated     Return if symptoms worsen or fail to improve, for Next scheduled follow up.      Murphy Wood MD    2020  13:59

## 2020-09-15 ENCOUNTER — HOSPITAL ENCOUNTER (OUTPATIENT)
Facility: HOSPITAL | Age: 30
Discharge: HOME | End: 2020-09-15
Attending: OBSTETRICS & GYNECOLOGY | Admitting: OBSTETRICS & GYNECOLOGY

## 2020-09-15 ENCOUNTER — PROCEDURE VISIT (OUTPATIENT)
Dept: OBSTETRICS AND GYNECOLOGY | Facility: CLINIC | Age: 30
End: 2020-09-15

## 2020-09-15 VITALS
DIASTOLIC BLOOD PRESSURE: 88 MMHG | HEART RATE: 110 BPM | SYSTOLIC BLOOD PRESSURE: 145 MMHG | RESPIRATION RATE: 20 BRPM | BODY MASS INDEX: 32.44 KG/M2 | WEIGHT: 219 LBS | HEIGHT: 69 IN | TEMPERATURE: 98.4 F

## 2020-09-15 DIAGNOSIS — O26.892 PELVIC PAIN IN PREGNANCY, ANTEPARTUM, SECOND TRIMESTER: Primary | ICD-10-CM

## 2020-09-15 DIAGNOSIS — R10.2 FEELING PELVIC PRESSURE DURING PREGNANCY, ANTEPARTUM: ICD-10-CM

## 2020-09-15 DIAGNOSIS — R10.2 PELVIC PAIN IN PREGNANCY, ANTEPARTUM, SECOND TRIMESTER: Primary | ICD-10-CM

## 2020-09-15 DIAGNOSIS — O26.899 FEELING PELVIC PRESSURE DURING PREGNANCY, ANTEPARTUM: ICD-10-CM

## 2020-09-15 LAB
AMPHET+METHAMPHET UR QL: NEGATIVE
AMPHETAMINES UR QL: NEGATIVE
BARBITURATES UR QL SCN: NEGATIVE
BENZODIAZ UR QL SCN: NEGATIVE
BILIRUB UR QL STRIP: NEGATIVE
BUPRENORPHINE SERPL-MCNC: NEGATIVE NG/ML
CANNABINOIDS SERPL QL: NEGATIVE
CLARITY UR: ABNORMAL
COCAINE UR QL: NEGATIVE
COLOR UR: ABNORMAL
GLUCOSE UR STRIP-MCNC: NEGATIVE MG/DL
HGB UR QL STRIP.AUTO: NEGATIVE
KETONES UR QL STRIP: NEGATIVE
LEUKOCYTE ESTERASE UR QL STRIP.AUTO: NEGATIVE
METHADONE UR QL SCN: NEGATIVE
NITRITE UR QL STRIP: NEGATIVE
OPIATES UR QL: NEGATIVE
OXYCODONE UR QL SCN: NEGATIVE
PCP UR QL SCN: NEGATIVE
PH UR STRIP.AUTO: 6.5 [PH] (ref 4.5–8)
PROPOXYPH UR QL: NEGATIVE
PROT UR QL STRIP: NEGATIVE
SP GR UR STRIP: 1.01 (ref 1–1.03)
TRICYCLICS UR QL SCN: NEGATIVE
UROBILINOGEN UR QL STRIP: ABNORMAL

## 2020-09-15 PROCEDURE — 59025 FETAL NON-STRESS TEST: CPT

## 2020-09-15 PROCEDURE — G0463 HOSPITAL OUTPT CLINIC VISIT: HCPCS

## 2020-09-15 PROCEDURE — 59025 FETAL NON-STRESS TEST: CPT | Performed by: OBSTETRICS & GYNECOLOGY

## 2020-09-15 PROCEDURE — 80306 DRUG TEST PRSMV INSTRMNT: CPT | Performed by: OBSTETRICS & GYNECOLOGY

## 2020-09-15 PROCEDURE — 76817 TRANSVAGINAL US OBSTETRIC: CPT | Performed by: OBSTETRICS & GYNECOLOGY

## 2020-09-15 PROCEDURE — 81003 URINALYSIS AUTO W/O SCOPE: CPT | Performed by: OBSTETRICS & GYNECOLOGY

## 2020-09-15 RX ORDER — ACETAMINOPHEN 500 MG
TABLET ORAL
Status: DISCONTINUED
Start: 2020-09-15 | End: 2020-09-15 | Stop reason: HOSPADM

## 2020-09-15 RX ORDER — ACETAMINOPHEN 500 MG
1000 TABLET ORAL ONCE
Status: COMPLETED | OUTPATIENT
Start: 2020-09-15 | End: 2020-09-15

## 2020-09-15 RX ORDER — ACETAMINOPHEN 500 MG
TABLET ORAL
Status: COMPLETED
Start: 2020-09-15 | End: 2020-09-15

## 2020-09-15 RX ADMIN — Medication 1000 MG: at 09:54

## 2020-09-15 RX ADMIN — ACETAMINOPHEN 1000 MG: 500 TABLET, FILM COATED ORAL at 09:54

## 2020-09-15 NOTE — NON STRESS TEST
Es Branch, a  at 26w0d with an DAFNE of 2020, by Ultrasound, was seen at [facility] for a nonstress test.    Chief Complaint   Patient presents with   • Back Pain     radiating to legs, comes and goes, started approx 0700 pain increased to 7/10       Patient Active Problem List   Diagnosis   • Pregnancy   • CHTN- labetalol 50 mg QD, baseline 24 hour urine- 80 mg   • Rubella non-immune status, antepartum- MMR pp   • PCOS (polycystic ovarian syndrome)   • Elevated glycosylated hemoglobin- HgBA1c= 5.7, needs early 2 hour GTT at 20  & 28 weeks   • Anemia, unspecified- HgB 11.3 - daily iron   • Solitary cyst of right breast: 5.7cm. Scheduled for drainage   • Gastroesophageal reflux disease with esophagitis     PT SVE CLOSED THICK, MEDIUM, POSTERIOR. TYLENOL DECREASED PAIN TO 3/10 CERVICAL LENGTH US WNL, UA WNL, UDS NEG  Start Time: 0902  Stop Time: 1051    Interpretation A  Nonstress Test Interpretation A: Reactive(FOR GESTATIONAL AGE) (09/15/20 0954 : Letty Boogie, RN)

## 2020-09-28 ENCOUNTER — PROCEDURE VISIT (OUTPATIENT)
Dept: OBSTETRICS AND GYNECOLOGY | Facility: CLINIC | Age: 30
End: 2020-09-28

## 2020-09-28 ENCOUNTER — ROUTINE PRENATAL (OUTPATIENT)
Dept: OBSTETRICS AND GYNECOLOGY | Facility: CLINIC | Age: 30
End: 2020-09-28

## 2020-09-28 VITALS — DIASTOLIC BLOOD PRESSURE: 70 MMHG | WEIGHT: 227 LBS | BODY MASS INDEX: 33.52 KG/M2 | SYSTOLIC BLOOD PRESSURE: 132 MMHG

## 2020-09-28 DIAGNOSIS — Z34.93 PRENATAL CARE IN THIRD TRIMESTER: Primary | ICD-10-CM

## 2020-09-28 DIAGNOSIS — Z36.9 ENCOUNTER FOR ANTENATAL SCREENING, UNSPECIFIED: ICD-10-CM

## 2020-09-28 DIAGNOSIS — D64.9 ANEMIA, UNSPECIFIED TYPE: ICD-10-CM

## 2020-09-28 DIAGNOSIS — O10.011 PRE-EXISTING ESSENTIAL HYPERTENSION DURING PREGNANCY IN FIRST TRIMESTER: ICD-10-CM

## 2020-09-28 DIAGNOSIS — O10.011 PRE-EXISTING ESSENTIAL HYPERTENSION DURING PREGNANCY IN FIRST TRIMESTER: Primary | ICD-10-CM

## 2020-09-28 LAB
GLUCOSE 1H P 75 G GLC PO SERPL-MCNC: 185 MG/DL (ref 65–179)
GLUCOSE 2H P 75 G GLC PO SERPL-MCNC: 109 MG/DL (ref 65–154)
GLUCOSE P FAST SERPL-MCNC: 79 MG/DL (ref 65–94)
GLUCOSE UR STRIP-MCNC: NEGATIVE MG/DL
HCT VFR BLD AUTO: 29.9 % (ref 34–46.6)
HGB BLD-MCNC: 10 G/DL (ref 12–15.9)
PROT UR STRIP-MCNC: NEGATIVE MG/DL

## 2020-09-28 PROCEDURE — 76816 OB US FOLLOW-UP PER FETUS: CPT | Performed by: NURSE PRACTITIONER

## 2020-09-28 PROCEDURE — 0502F SUBSEQUENT PRENATAL CARE: CPT | Performed by: NURSE PRACTITIONER

## 2020-09-28 NOTE — PROGRESS NOTES
OB follow up > 20 weeks    Chief Complaint   Patient presents with   • Routine Prenatal Visit       Es Branch is a 30 y.o.  27w6d being seen today for her obstetrical visit.  Patient reports having leg cramps at night. . Taking prenatal vitamins: Yes      Review of Systems  Constitutional: neg fatigue  Cardiovascular: neg edema  Gastrointestinal: neg n/v  Genitourinary: Negative for contractions, cramping, vaginal bleeding, or SROM.  Musculoskeletal: + leg cramps    Fetal movement: normal    /70   Wt 103 kg (227 lb)   LMP 2020 (Exact Date)   BMI 33.52 kg/m²     FHT: present BPM   Uterine Size: growth 61%        Assessment    1) pregnancy at 27w6d- 2hr GTT in progress. Rh +. US IMP: Breech presentation. EFW 61%. JULIAN 14cm.     2) CHTN- Baseline 24-hour urine was 80 mg.  Pt taking Labetalol 50mg daily     3) RNI-Offer MMR vaccine postpartum      4) Anemia- Hgb 10.6g/dL. Taking iron two or three times per day. Check Hgb today.      5) Elevated HgbA1C and PCOS- Hgb A1C 5.7. Passed early 2hr GTT. 2hr GTT in progress today.      6) Right breast cyst: S/P cyst drainage. Reports she is doing well.      7) Former smoker- Quit!      8) Heartburn- Taking Carafate and is followed by GI.      9) S>D- Growth 61% today.     10) Tdap vaccine- Enc tdap vaccine. Info provided. Disc that all pregnant women should get a Tdap shot in the third trimester, preferably between 27 weeks and 36 weeks of pregnancy. The Tdap shot is an effective and safe way to protect the baby from serious illness and complications of pertussis. Recommend that partners, family members, and infant caregivers should be up to date on theTdap vaccine if they have not previously been vaccinated. Ideally, all family members should be vaccinated at least 2 weeks before coming in contact with the . If not administered during pregnancy, the Tdap vaccine should be given immediately postpartum if the patient is not UTD on Tdap.         Plan    Continue prenatal vitamins  Reviewed this stage of pregnancy  Problem list updated   Follow up in 2 weeks    NORMAN Dubon  9/28/2020  10:19 EDT

## 2020-09-29 DIAGNOSIS — O24.410 DIET CONTROLLED GESTATIONAL DIABETES MELLITUS (GDM), ANTEPARTUM: Primary | ICD-10-CM

## 2020-09-29 RX ORDER — LANCETS 30 GAUGE
EACH MISCELLANEOUS
Qty: 120 EACH | Refills: 6 | Status: SHIPPED | OUTPATIENT
Start: 2020-09-29 | End: 2021-01-07

## 2020-09-29 RX ORDER — BLOOD-GLUCOSE METER
KIT MISCELLANEOUS
Qty: 1 EACH | Refills: 1 | Status: SHIPPED | OUTPATIENT
Start: 2020-09-29 | End: 2021-01-07

## 2020-10-05 ENCOUNTER — TELEPHONE (OUTPATIENT)
Dept: OBSTETRICS AND GYNECOLOGY | Facility: CLINIC | Age: 30
End: 2020-10-05

## 2020-10-05 ENCOUNTER — ROUTINE PRENATAL (OUTPATIENT)
Dept: OBSTETRICS AND GYNECOLOGY | Facility: CLINIC | Age: 30
End: 2020-10-05

## 2020-10-05 VITALS — BODY MASS INDEX: 33.48 KG/M2 | WEIGHT: 226.7 LBS | DIASTOLIC BLOOD PRESSURE: 88 MMHG | SYSTOLIC BLOOD PRESSURE: 122 MMHG

## 2020-10-05 DIAGNOSIS — O24.410 DIET CONTROLLED GESTATIONAL DIABETES MELLITUS (GDM) IN THIRD TRIMESTER: ICD-10-CM

## 2020-10-05 DIAGNOSIS — Z23 NEED FOR TDAP VACCINATION: ICD-10-CM

## 2020-10-05 DIAGNOSIS — O09.899 RUBELLA NON-IMMUNE STATUS, ANTEPARTUM: ICD-10-CM

## 2020-10-05 DIAGNOSIS — Z28.39 RUBELLA NON-IMMUNE STATUS, ANTEPARTUM: ICD-10-CM

## 2020-10-05 DIAGNOSIS — Z23 NEEDS FLU SHOT: ICD-10-CM

## 2020-10-05 DIAGNOSIS — D50.8 OTHER IRON DEFICIENCY ANEMIA: ICD-10-CM

## 2020-10-05 DIAGNOSIS — O10.011 PRE-EXISTING ESSENTIAL HYPERTENSION DURING PREGNANCY IN FIRST TRIMESTER: Primary | ICD-10-CM

## 2020-10-05 DIAGNOSIS — Z34.90 PREGNANCY, UNSPECIFIED GESTATIONAL AGE: ICD-10-CM

## 2020-10-05 LAB
GLUCOSE UR STRIP-MCNC: NEGATIVE MG/DL
PROT UR STRIP-MCNC: NEGATIVE MG/DL

## 2020-10-05 PROCEDURE — 90715 TDAP VACCINE 7 YRS/> IM: CPT | Performed by: OBSTETRICS & GYNECOLOGY

## 2020-10-05 PROCEDURE — 90686 IIV4 VACC NO PRSV 0.5 ML IM: CPT | Performed by: OBSTETRICS & GYNECOLOGY

## 2020-10-05 PROCEDURE — 90471 IMMUNIZATION ADMIN: CPT | Performed by: OBSTETRICS & GYNECOLOGY

## 2020-10-05 PROCEDURE — 0502F SUBSEQUENT PRENATAL CARE: CPT | Performed by: OBSTETRICS & GYNECOLOGY

## 2020-10-05 PROCEDURE — 90472 IMMUNIZATION ADMIN EACH ADD: CPT | Performed by: OBSTETRICS & GYNECOLOGY

## 2020-10-05 RX ORDER — GLYBURIDE 2.5 MG/1
TABLET ORAL
Qty: 30 TABLET | Refills: 6 | Status: SHIPPED | OUTPATIENT
Start: 2020-10-05 | End: 2020-10-26 | Stop reason: SDUPTHER

## 2020-10-05 NOTE — TELEPHONE ENCOUNTER
Pt called the office this morning stating she spoke with you this weekend about leg pain and her blood pressure and she stated you wanted her to be seen today if possible, is this correct?

## 2020-10-05 NOTE — PROGRESS NOTES
OB follow up     Es Branch is a 30 y.o.  28w6d being seen today for her obstetrical visit.  Patient reports no complaints. Fetal movement: normal.    Review of Systems  No bleeding, No cramping/contractions     /88   Wt 103 kg (226 lb 11.2 oz)   LMP 2020 (Exact Date)   BMI 33.48 kg/m²     FHT: 145 BPM   Uterine Size: 31  cm       Assessment/Plan:    1) 30 y.o.  -pregnancy at 28w6d- GDM. Pt only has a few days of blood sugar values but fastings 118 and 2 hour GTT 90- 181. Does not want insulin. Glyburide 2.5 mg with breakfast and before bedtime.Last growth US was = 61%.     2) CHTN- Baseline 24-hour urine was 80 mg.  Pt taking Labetalol 50mg daily     3) RNI-Offer MMR vaccine postpartum      4) Anemia- Hgb 10.0g/dL. Increase iron TID.     5) Flu shot today. Enc household contacts to be vaccinated as well.     6) Right breast cyst: S/P cyst drainage. Reports she is doing well.      7) Former smoker- Quit!      8) Heartburn- Taking Carafate and is followed by GI.      9) Tdap today. Patient advised to have the Tdap shot in the later part of pregnancy to help protect against whooping cough.  Also advised that FOB and other adults who come in contact with the infant should also be vaccinated with Tdap.       10) COVID precautions given. I saw the patient with a face mask, gloves and eye protection  The patient herself was masked.  Social distancing was observed as appropriate.    11)Reviewed this stage of pregnancy    12)Problem list updated     13) RTO 1 week OBT and review BG log.       Imelda Rayo MD    10/5/2020  15:13 EDT

## 2020-10-10 PROBLEM — O24.419 GESTATIONAL DIABETES: Status: ACTIVE | Noted: 2020-10-10

## 2020-10-12 ENCOUNTER — ROUTINE PRENATAL (OUTPATIENT)
Dept: OBSTETRICS AND GYNECOLOGY | Facility: CLINIC | Age: 30
End: 2020-10-12

## 2020-10-12 VITALS — BODY MASS INDEX: 33.67 KG/M2 | DIASTOLIC BLOOD PRESSURE: 76 MMHG | SYSTOLIC BLOOD PRESSURE: 122 MMHG | WEIGHT: 228 LBS

## 2020-10-12 DIAGNOSIS — O24.419 GDM, CLASS A2: ICD-10-CM

## 2020-10-12 DIAGNOSIS — O10.011 PRE-EXISTING ESSENTIAL HYPERTENSION DURING PREGNANCY IN FIRST TRIMESTER: ICD-10-CM

## 2020-10-12 DIAGNOSIS — Z34.93 PRENATAL CARE IN THIRD TRIMESTER: Primary | ICD-10-CM

## 2020-10-12 DIAGNOSIS — O09.899 RUBELLA NON-IMMUNE STATUS, ANTEPARTUM: ICD-10-CM

## 2020-10-12 DIAGNOSIS — Z28.39 RUBELLA NON-IMMUNE STATUS, ANTEPARTUM: ICD-10-CM

## 2020-10-12 LAB
GLUCOSE UR STRIP-MCNC: NEGATIVE MG/DL
PROT UR STRIP-MCNC: NEGATIVE MG/DL

## 2020-10-12 PROCEDURE — 0502F SUBSEQUENT PRENATAL CARE: CPT | Performed by: NURSE PRACTITIONER

## 2020-10-12 RX ORDER — BLOOD-GLUCOSE METER
EACH MISCELLANEOUS
COMMUNITY
Start: 2020-09-29 | End: 2021-01-07

## 2020-10-12 NOTE — PROGRESS NOTES
OB follow up > 20 weeks    Chief Complaint   Patient presents with   • Routine Prenatal Visit       Es Branch is a 30 y.o.  29w6d being seen today for her obstetrical visit.  Patient reports no complaints. Reports she is feeling well. She has her completed BS log and is taking Glyburide 2.5mg BID. She reports she is working on dietary changes as well. Taking prenatal vitamins: Yes      Review of Systems  Constitutional: neg fatigue  Cardiovascular: neg edema  Gastrointestinal: neg n/v  Genitourinary: Negative for contractions, cramping, vaginal bleeding, or SROM.   Fetal movement: normal    /76   Wt 103 kg (228 lb)   LMP 2020 (Exact Date)   BMI 33.67 kg/m²     FHT: present BPM   Uterine Size: size greater than dates       Assessment    1) pregnancy at 29w6d- GDMA2. Taking Glyburide 2.5mg BID. Her BS logs show improvement and is complete. Her fastings continue to be elevated. She is eating peanut butter and crackers as her bedtime snack. Enc high protein snack at bedtime. Growth = 61%. Start ANT @ 34 weeks.      2) CHTN- Baseline 24-hour urine was 80 mg.  Pt taking Labetalol 50mg daily. BP good control. Neg proteinuria.      3) RNI-Offer MMR vaccine postpartum      4) Anemia- Hgb 10.0g/dL. She is struggling with taking iron TID. Plan to recheck iron at next visit. If no improvement in Hgb consider hematology ref.      5) S/P Flu shot. Enc household contacts to be vaccinated as well.     6) Right breast cyst: S/P cyst drainage. Reports she is doing well.      7) Former smoker- Quit!      8) Heartburn- Taking Carafate and Protonix. Followed by GI.      9) S/P Tdap. Patient advised to have the Tdap shot in the later part of pregnancy to help protect against whooping cough.  Also advised that FOB and other adults who come in contact with the infant should also be vaccinated with Tdap.        10) COVID precautions given. I saw the patient with a face mask, gloves and eye protection  The  patient herself was masked.  Social distancing was observed as appropriate.    11) S>D- Growth 68% on 9/28/20. Check growth next visit.     Plan    Continue prenatal vitamins  Reviewed this stage of pregnancy  Problem list updated   Follow up in 2 weeks for OB tummy, Growth US, labs and rev of BS log     María Elena Arthur, NORMAN  10/12/2020  11:02 EDT

## 2020-10-14 ENCOUNTER — HOSPITAL ENCOUNTER (OUTPATIENT)
Facility: HOSPITAL | Age: 30
Setting detail: OBSERVATION
Discharge: HOME | End: 2020-10-14
Attending: OBSTETRICS & GYNECOLOGY | Admitting: OBSTETRICS & GYNECOLOGY

## 2020-10-14 VITALS
HEIGHT: 69 IN | SYSTOLIC BLOOD PRESSURE: 124 MMHG | HEART RATE: 86 BPM | DIASTOLIC BLOOD PRESSURE: 85 MMHG | TEMPERATURE: 98.4 F | RESPIRATION RATE: 20 BRPM | WEIGHT: 228 LBS | BODY MASS INDEX: 33.77 KG/M2

## 2020-10-14 LAB
AMPHET+METHAMPHET UR QL: NEGATIVE
AMPHETAMINES UR QL: NEGATIVE
BACTERIA UR QL AUTO: ABNORMAL /HPF
BARBITURATES UR QL SCN: NEGATIVE
BENZODIAZ UR QL SCN: NEGATIVE
BILIRUB UR QL STRIP: NEGATIVE
BUPRENORPHINE SERPL-MCNC: NEGATIVE NG/ML
CANNABINOIDS SERPL QL: NEGATIVE
CLARITY UR: CLEAR
COCAINE UR QL: NEGATIVE
COLOR UR: YELLOW
GLUCOSE UR STRIP-MCNC: NEGATIVE MG/DL
HGB UR QL STRIP.AUTO: ABNORMAL
HYALINE CASTS UR QL AUTO: ABNORMAL /LPF
KETONES UR QL STRIP: ABNORMAL
LEUKOCYTE ESTERASE UR QL STRIP.AUTO: NEGATIVE
METHADONE UR QL SCN: NEGATIVE
MUCOUS THREADS URNS QL MICRO: ABNORMAL /HPF
NITRITE UR QL STRIP: NEGATIVE
OPIATES UR QL: NEGATIVE
OXYCODONE UR QL SCN: NEGATIVE
PCP UR QL SCN: NEGATIVE
PH UR STRIP.AUTO: 5.5 [PH] (ref 4.5–8)
PROPOXYPH UR QL: NEGATIVE
PROT UR QL STRIP: ABNORMAL
RBC # UR: ABNORMAL /HPF
REF LAB TEST METHOD: ABNORMAL
SP GR UR STRIP: 1.04 (ref 1–1.03)
SQUAMOUS #/AREA URNS HPF: ABNORMAL /HPF
TRICYCLICS UR QL SCN: NEGATIVE
UROBILINOGEN UR QL STRIP: ABNORMAL
WBC UR QL AUTO: ABNORMAL /HPF

## 2020-10-14 PROCEDURE — 59025 FETAL NON-STRESS TEST: CPT | Performed by: OBSTETRICS & GYNECOLOGY

## 2020-10-14 PROCEDURE — G0378 HOSPITAL OBSERVATION PER HR: HCPCS

## 2020-10-14 PROCEDURE — 87086 URINE CULTURE/COLONY COUNT: CPT | Performed by: OBSTETRICS & GYNECOLOGY

## 2020-10-14 PROCEDURE — 80306 DRUG TEST PRSMV INSTRMNT: CPT | Performed by: OBSTETRICS & GYNECOLOGY

## 2020-10-14 PROCEDURE — 81001 URINALYSIS AUTO W/SCOPE: CPT | Performed by: OBSTETRICS & GYNECOLOGY

## 2020-10-14 PROCEDURE — 59025 FETAL NON-STRESS TEST: CPT

## 2020-10-14 RX ORDER — HYDROCODONE BITARTRATE AND ACETAMINOPHEN 5; 325 MG/1; MG/1
TABLET ORAL
Status: COMPLETED
Start: 2020-10-14 | End: 2020-10-14

## 2020-10-14 RX ORDER — HYDROCODONE BITARTRATE AND ACETAMINOPHEN 5; 325 MG/1; MG/1
2 TABLET ORAL ONCE
Status: COMPLETED | OUTPATIENT
Start: 2020-10-14 | End: 2020-10-14

## 2020-10-14 RX ADMIN — HYDROCODONE BITARTRATE AND ACETAMINOPHEN 2 TABLET: 5; 325 TABLET ORAL at 21:55

## 2020-10-15 ENCOUNTER — TELEPHONE (OUTPATIENT)
Dept: OBSTETRICS AND GYNECOLOGY | Facility: CLINIC | Age: 30
End: 2020-10-15

## 2020-10-15 NOTE — TELEPHONE ENCOUNTER
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 She is off work today,but would like to know if she needs to go tomorrow or continue to be off work.

## 2020-10-15 NOTE — NURSING NOTE
Case Management Discharge Note      Final Note: Discharged home.         Selected Continued Care - Discharged on 10/14/2020 Admission date: 10/14/2020 - Discharge disposition: Home or Self Care    Destination    No services have been selected for the patient.              Durable Medical Equipment    No services have been selected for the patient.              Dialysis/Infusion    No services have been selected for the patient.              Home Medical Care    No services have been selected for the patient.              Therapy    No services have been selected for the patient.              Community Resources    No services have been selected for the patient.                       Final Discharge Disposition Code: 01 - home or self-care

## 2020-10-15 NOTE — NON STRESS TEST
Es Branch, a  at 30w1d with an DAFNE of 2020, by Ultrasound, was seen at [facility] for a nonstress test.    Chief Complaint   Patient presents with   • Back Pain   • Contractions   approximately 30min-1hr prior to arrival    Tx: Norco x2 tabs, K-Pad, PO Hydration-- Pt obsevered for >2hrs expresses improved pain and ready for discharge.    Patient Active Problem List   Diagnosis   • Pregnancy   • CHTN- labetalol 50 mg QD, baseline 24 hour urine- 80 mg   • Rubella non-immune status, antepartum- MMR pp   • PCOS (polycystic ovarian syndrome)   • Elevated glycosylated hemoglobin- HgBA1c= 5.7, needs early 2 hour GTT at 20  & 28 weeks   • Anemia, unspecified- HgB 11.3 - daily iron   • Solitary cyst of right breast: 5.7cm. Scheduled for drainage   • Gastroesophageal reflux disease with esophagitis   • Gestational diabetes       Start Time:   Stop Time:     Interpretation A  Nonstress Test Interpretation A: Reactive (10/14/20 2305 : Janie Chong RN)

## 2020-10-15 NOTE — TELEPHONE ENCOUNTER
She probably needs to be seen again before we can make that decision. If she feels better off of work, she can start maternity leave. If she continues to have contractions, she needs to be seen again.

## 2020-10-15 NOTE — NURSING NOTE
Discharge instructions written and verbal reviewed with pt. Pt states pain has improved and requests to go home. No CXT noted reactive FHT for gestational age.  waiting in car for patient. Left unit ambulatory in no signs of distress/

## 2020-10-16 ENCOUNTER — OFFICE VISIT (OUTPATIENT)
Dept: ORTHOPEDIC SURGERY | Facility: CLINIC | Age: 30
End: 2020-10-16

## 2020-10-16 ENCOUNTER — ROUTINE PRENATAL (OUTPATIENT)
Dept: OBSTETRICS AND GYNECOLOGY | Facility: CLINIC | Age: 30
End: 2020-10-16

## 2020-10-16 VITALS
SYSTOLIC BLOOD PRESSURE: 130 MMHG | HEIGHT: 69 IN | DIASTOLIC BLOOD PRESSURE: 87 MMHG | HEART RATE: 89 BPM | BODY MASS INDEX: 33.62 KG/M2 | WEIGHT: 227 LBS

## 2020-10-16 VITALS — BODY MASS INDEX: 33.64 KG/M2 | DIASTOLIC BLOOD PRESSURE: 80 MMHG | WEIGHT: 227.8 LBS | SYSTOLIC BLOOD PRESSURE: 120 MMHG

## 2020-10-16 DIAGNOSIS — O99.891 SACROILIAC PAIN DURING PREGNANCY: Primary | ICD-10-CM

## 2020-10-16 DIAGNOSIS — K21.01 GASTROESOPHAGEAL REFLUX DISEASE WITH ESOPHAGITIS AND HEMORRHAGE: ICD-10-CM

## 2020-10-16 DIAGNOSIS — O24.410 GESTATIONAL DIABETES MELLITUS, CLASS A1: ICD-10-CM

## 2020-10-16 DIAGNOSIS — O99.891 BACK PAIN AFFECTING PREGNANCY, ANTEPARTUM: Primary | ICD-10-CM

## 2020-10-16 DIAGNOSIS — E28.2 PCOS (POLYCYSTIC OVARIAN SYNDROME): ICD-10-CM

## 2020-10-16 DIAGNOSIS — M53.3 SACROILIAC PAIN DURING PREGNANCY: Primary | ICD-10-CM

## 2020-10-16 DIAGNOSIS — Z3A.30 30 WEEKS GESTATION OF PREGNANCY: ICD-10-CM

## 2020-10-16 DIAGNOSIS — M54.9 BACK PAIN AFFECTING PREGNANCY, ANTEPARTUM: Primary | ICD-10-CM

## 2020-10-16 DIAGNOSIS — O10.011 PRE-EXISTING ESSENTIAL HYPERTENSION DURING PREGNANCY IN FIRST TRIMESTER: ICD-10-CM

## 2020-10-16 LAB
BACTERIA SPEC AEROBE CULT: NORMAL
GLUCOSE UR STRIP-MCNC: NEGATIVE MG/DL
PROT UR STRIP-MCNC: NEGATIVE MG/DL

## 2020-10-16 PROCEDURE — 99203 OFFICE O/P NEW LOW 30 MIN: CPT | Performed by: NURSE PRACTITIONER

## 2020-10-16 PROCEDURE — 0502F SUBSEQUENT PRENATAL CARE: CPT | Performed by: OBSTETRICS & GYNECOLOGY

## 2020-10-16 NOTE — PROGRESS NOTES
Subjective:     Patient ID: Es Branch is a 30 y.o. female.    Chief Complaint:  Back pain  History of Present Illness  Es Branch 30-year-old female presents to clinic for evaluation of pain at the low back, sacrum.  Is been referred by OB for further evaluation.  Began experiencing pain approximately 14 days ago which is significantly increased.  Increased pain noted with seated, standing, ambulating.  Rates discomfort at worst a 5-7 out of a 10 this is her first pregnancy at 30 weeks has continued with Tylenol application of heating pad which does at least make the pain tolerable.  She is on restricted lifting, pulling, pushing will greater than 25 pounds at work.  Denies any prior injury in the past.  She does have gestational diabetes for some hypertension and again taking Tylenol only for symptom relief.  Denies any previous bracing, physical therapy.  Denies other concerns present time.    Social History     Occupational History   • Occupation: cna     Employer: [employer].   Tobacco Use   • Smoking status: Former Smoker     Types: Cigarettes     Quit date: 2020     Years since quittin.3   • Smokeless tobacco: Never Used   Substance and Sexual Activity   • Alcohol use: Not Currently   • Drug use: No   • Sexual activity: Yes     Partners: Male     Birth control/protection: None      Past Medical History:   Diagnosis Date   • Asthma    • Headache    • Hypertension    • PCOS (polycystic ovarian syndrome) 2013     Past Surgical History:   Procedure Laterality Date   • ADENOIDECTOMY     • DIAGNOSTIC LAPAROSCOPY N/A 2020    Procedure: DIAGNOSTIC LAPAROSCOPY;  Surgeon: Imelda Rayo MD;  Location: [facility];  Service: Obstetrics/Gynecology;  Laterality: N/A;   • TONSILLECTOMY  2008    adnoids also   • US GUIDED CYST ASPIRATION BREAST N/A 2020       Family History   Problem Relation Age of Onset   • Hypertension Mother    • Ovarian cancer Mother    • Hypertension  "Maternal Grandmother    • Diabetes Maternal Grandfather    • Hypertension Paternal Grandmother    • Diabetes Paternal Grandfather    • Deep vein thrombosis Maternal Aunt    • Breast cancer Neg Hx    • Colon cancer Neg Hx    • Pulmonary embolism Neg Hx    • Anesthesia problems Neg Hx          Review of Systems   Constitutional: Negative for chills, diaphoresis, fever and unexpected weight change.   HENT: Negative for hearing loss, nosebleeds, sore throat and tinnitus.    Eyes: Negative for pain and visual disturbance.   Respiratory: Negative for cough, shortness of breath and wheezing.    Cardiovascular: Negative for chest pain and palpitations.   Gastrointestinal: Negative for abdominal pain, diarrhea, nausea and vomiting.   Endocrine: Negative for cold intolerance, heat intolerance and polydipsia.   Genitourinary: Negative for difficulty urinating, dysuria and hematuria.   Musculoskeletal: Positive for back pain and myalgias. Negative for arthralgias and joint swelling.   Skin: Negative for rash and wound.   Allergic/Immunologic: Negative for environmental allergies.   Neurological: Negative for dizziness, syncope and numbness.   Hematological: Does not bruise/bleed easily.   Psychiatric/Behavioral: Negative for dysphoric mood and sleep disturbance. The patient is not nervous/anxious.            Objective:  Physical Exam    Vital signs reviewed.   General: No acute distress.  Eyes: conjunctiva clear; pupils equally round and reactive  ENT: external ears and nose atraumatic; oropharynx clear  CV: no peripheral edema  Resp: normal respiratory effort  Skin: no rashes or wounds; normal turgor  Psych: mood and affect appropriate; recent and remote memory intact    Vitals:    10/16/20 1149   BP: 130/87   BP Location: Left arm   Pulse: 89   Weight: 103 kg (227 lb)   Height: 175.3 cm (69\")         10/16/20  1149   Weight: 103 kg (227 lb)     Body mass index is 33.52 kg/m².      Ortho Exam     Low back exam  Positive " tenderness to palpate sacroiliac joint bilaterally,  Positive sensation light touch all distributions bilateral lower extremities  2+ distal pulse  Hip abduction 40 degrees bilaterally  Abduction 30 degrees bilaterally  Mild tenderness paraspinal muscles    Imaging:  No imaging completed at today's visit, patient is pregnant  Assessment:        1. Sacroiliac pain during pregnancy           Plan:  1.  Discussed plan of care with patient.  I do recommend maternity support belt for compression to relieve SI joint low back pain.  Also proceed with referral for physical therapy.  Discussed with patient plan to see her back in clinic in 4 weeks to reevaluate.  Again I do recommend Tylenol, heat alternating with ice and stretching that she will be provided with said therapy.  Encouraged to call between now and follow-up with any questions concerns she has.  Orders:  Orders Placed This Encounter   Procedures   • Ambulatory Referral to Physical Therapy Evaluate and treat       Medications:  No orders of the defined types were placed in this encounter.      Followup:  No follow-ups on file.    Diagnoses and all orders for this visit:    1. Sacroiliac pain during pregnancy (Primary)  -     Ambulatory Referral to Physical Therapy Evaluate and treat          I ordered and reviewed the MUMTAZ today.     Dictated utilizing Dragon dictation

## 2020-10-16 NOTE — PROGRESS NOTES
CC: Pt here for ob office visit, glucometer log review, discussion of back pain.    HPI: Es Branch is a 30 y.o.  30w3d being seen today for her obstetrical visit.   Patient reports backache .   Fetal movement: normal.     Pt did not bring in her glucometer log.  Forgot.  Counseled!  Pt was informed of importance in compliance with glucose intolerance management recommendations in pregnancy; with emphasis on consequences of noncompliance such as failure to maintain and bring glucometer log to appointments, failure with dietary changes, and failure to take medications as recommended.  Pt verbalized her understanding of the risks to the fetus including unexplained sudden fetal demise and birth injury other complications with delivery requiring transfer to a neontal intensive care and long term neurological or developmental issues.        ROS: Pt denies visual changes, headaches, shortness of breath, chest pain, esophageal reflux, gastric pain,   nausea and vomiting, diarrhea, rashes, vaginal bleeding, edema, hip pain, pelvic pressure.     SMOKER? No    ALCOHOL? No  ILLICIT DRUGS? No    O:  /80   Wt 103 kg (227 lb 12.8 oz)   LMP 2020 (Exact Date)   BMI 33.64 kg/m² , additional findings in addition to above flow sheet?: normal back exam    Data Review:  UA, flow sheet,  TESTING: u/s: pending next visit for S>D    Lab Results (last 24 hours)     Procedure Component Value Units Date/Time    POC Urinalysis Dipstick [[id number]] Resulted: 10/16/20     Specimen: Urine Updated: 10/16/20 1109     Glucose, UA Negative     Protein, POC Negative          A:    DIAGNOSES:  30 y.o.  30w3d  Patient Active Problem List   Diagnosis   • Pregnancy   • CHTN- labetalol 50 mg QD, baseline 24 hour urine- 80 mg   • Rubella non-immune status, antepartum- MMR pp   • PCOS (polycystic ovarian syndrome)   • Elevated glycosylated hemoglobin- HgBA1c= 5.7, needs early 2 hour GTT at 20  & 28 weeks   •  Gestational diabetes mellitus, class A1   • Solitary cyst of right breast: 5.7cm. Scheduled for drainage   • Gastroesophageal reflux disease with esophagitis   • Gestational diabetes     Diagnoses and all orders for this visit:    1. Back pain affecting pregnancy, antepartum (Primary)  -     Ambulatory Referral to Orthopedic Surgery    2. CHTN- labetalol 50 mg QD, baseline 24 hour urine- 80 mg    3. Gastroesophageal reflux disease with esophagitis and hemorrhage    4. PCOS (polycystic ovarian syndrome)    5. Gestational diabetes mellitus, class A1    6. 30 weeks gestation of pregnancy      Pregnancy Assessment : High Risk Pregnancy obesity, A1DM  NEW PROBLEMS? Back pain, preventing pt from working.  Pt requests paid leave.      P:  Tests ordered for this or next visit: NONE   New Meds:No    Will order ortho consult to evaluate back pain.    Return in about 2 weeks (around 10/30/2020) for Recheck. and us for EFW    TIME: Time spent in face to face counseling 35 min.  Counseling included the following topics compliance with diabetes monitoring, back pain evaluation, hypertension with prognosis, differential diagnosis, risks, benefits of treatment, instructions, compliance and/or risk reduction and alternatives.         Leovn Marcos MD

## 2020-10-26 ENCOUNTER — HOSPITAL ENCOUNTER (OUTPATIENT)
Facility: HOSPITAL | Age: 30
Discharge: HOME | End: 2020-10-26
Attending: OBSTETRICS & GYNECOLOGY | Admitting: OBSTETRICS & GYNECOLOGY

## 2020-10-26 ENCOUNTER — PROCEDURE VISIT (OUTPATIENT)
Dept: OBSTETRICS AND GYNECOLOGY | Facility: CLINIC | Age: 30
End: 2020-10-26

## 2020-10-26 ENCOUNTER — ROUTINE PRENATAL (OUTPATIENT)
Dept: OBSTETRICS AND GYNECOLOGY | Facility: CLINIC | Age: 30
End: 2020-10-26

## 2020-10-26 VITALS
HEART RATE: 80 BPM | SYSTOLIC BLOOD PRESSURE: 133 MMHG | TEMPERATURE: 97.6 F | RESPIRATION RATE: 20 BRPM | DIASTOLIC BLOOD PRESSURE: 82 MMHG

## 2020-10-26 VITALS — WEIGHT: 224 LBS | DIASTOLIC BLOOD PRESSURE: 82 MMHG | BODY MASS INDEX: 33.08 KG/M2 | SYSTOLIC BLOOD PRESSURE: 120 MMHG

## 2020-10-26 DIAGNOSIS — O24.419 GDM, CLASS A2: ICD-10-CM

## 2020-10-26 DIAGNOSIS — O10.011 PRE-EXISTING ESSENTIAL HYPERTENSION DURING PREGNANCY IN FIRST TRIMESTER: ICD-10-CM

## 2020-10-26 DIAGNOSIS — Z34.93 PRENATAL CARE IN THIRD TRIMESTER: Primary | ICD-10-CM

## 2020-10-26 DIAGNOSIS — O09.899 RUBELLA NON-IMMUNE STATUS, ANTEPARTUM: ICD-10-CM

## 2020-10-26 DIAGNOSIS — O99.019 MATERNAL ANEMIA IN PREGNANCY, ANTEPARTUM: ICD-10-CM

## 2020-10-26 DIAGNOSIS — Z28.39 RUBELLA NON-IMMUNE STATUS, ANTEPARTUM: ICD-10-CM

## 2020-10-26 DIAGNOSIS — O24.410 DIET CONTROLLED GESTATIONAL DIABETES MELLITUS (GDM) IN THIRD TRIMESTER: Primary | ICD-10-CM

## 2020-10-26 DIAGNOSIS — O24.410 GESTATIONAL DIABETES MELLITUS, CLASS A1: ICD-10-CM

## 2020-10-26 LAB
AMPHET+METHAMPHET UR QL: NEGATIVE
AMPHETAMINES UR QL: NEGATIVE
BACTERIA UR QL AUTO: ABNORMAL /HPF
BARBITURATES UR QL SCN: NEGATIVE
BENZODIAZ UR QL SCN: NEGATIVE
BILIRUB UR QL STRIP: NEGATIVE
BUPRENORPHINE SERPL-MCNC: NEGATIVE NG/ML
CANNABINOIDS SERPL QL: NEGATIVE
CLARITY UR: CLEAR
COCAINE UR QL: NEGATIVE
COLOR UR: YELLOW
GLUCOSE BLDC GLUCOMTR-MCNC: 83 MG/DL (ref 70–130)
GLUCOSE UR STRIP-MCNC: NEGATIVE MG/DL
GLUCOSE UR STRIP-MCNC: NEGATIVE MG/DL
HGB UR QL STRIP.AUTO: ABNORMAL
HYALINE CASTS UR QL AUTO: ABNORMAL /LPF
KETONES UR QL STRIP: NEGATIVE
LEUKOCYTE ESTERASE UR QL STRIP.AUTO: NEGATIVE
METHADONE UR QL SCN: NEGATIVE
MUCOUS THREADS URNS QL MICRO: ABNORMAL /HPF
NITRITE UR QL STRIP: NEGATIVE
OPIATES UR QL: NEGATIVE
OXYCODONE UR QL SCN: NEGATIVE
PCP UR QL SCN: NEGATIVE
PH UR STRIP.AUTO: 6.5 [PH] (ref 4.5–8)
PROPOXYPH UR QL: NEGATIVE
PROT UR QL STRIP: NEGATIVE
PROT UR STRIP-MCNC: NEGATIVE MG/DL
RBC # UR: ABNORMAL /HPF
REF LAB TEST METHOD: ABNORMAL
SP GR UR STRIP: 1.02 (ref 1–1.03)
SQUAMOUS #/AREA URNS HPF: ABNORMAL /HPF
TRANS CELLS #/AREA URNS HPF: ABNORMAL /HPF
TRICYCLICS UR QL SCN: NEGATIVE
UROBILINOGEN UR QL STRIP: ABNORMAL
WBC UR QL AUTO: ABNORMAL /HPF

## 2020-10-26 PROCEDURE — 81001 URINALYSIS AUTO W/SCOPE: CPT | Performed by: OBSTETRICS & GYNECOLOGY

## 2020-10-26 PROCEDURE — 59025 FETAL NON-STRESS TEST: CPT

## 2020-10-26 PROCEDURE — G0463 HOSPITAL OUTPT CLINIC VISIT: HCPCS

## 2020-10-26 PROCEDURE — 76818 FETAL BIOPHYS PROFILE W/NST: CPT | Performed by: NURSE PRACTITIONER

## 2020-10-26 PROCEDURE — 76816 OB US FOLLOW-UP PER FETUS: CPT | Performed by: NURSE PRACTITIONER

## 2020-10-26 PROCEDURE — 0502F SUBSEQUENT PRENATAL CARE: CPT | Performed by: NURSE PRACTITIONER

## 2020-10-26 PROCEDURE — 82962 GLUCOSE BLOOD TEST: CPT

## 2020-10-26 PROCEDURE — 80306 DRUG TEST PRSMV INSTRMNT: CPT | Performed by: OBSTETRICS & GYNECOLOGY

## 2020-10-26 RX ORDER — GLYBURIDE 5 MG/1
5 TABLET ORAL
Qty: 30 TABLET | Refills: 6 | Status: SHIPPED | OUTPATIENT
Start: 2020-10-26 | End: 2020-12-18 | Stop reason: HOSPADM

## 2020-10-26 RX ORDER — GLYBURIDE 2.5 MG/1
TABLET ORAL
Qty: 30 TABLET | Refills: 6 | Status: ON HOLD | OUTPATIENT
Start: 2020-10-26 | End: 2020-11-15

## 2020-10-27 LAB
HCT VFR BLD AUTO: 31.9 % (ref 34–46.6)
HGB BLD-MCNC: 10.3 G/DL (ref 12–15.9)

## 2020-10-27 NOTE — NON STRESS TEST
sE Branch, a  at 31w6d with an DAFNE of 2020, by Ultrasound, was seen at [facility] for a nonstress test.    Chief Complaint   Patient presents with   • Vision Disturbance       Patient Active Problem List   Diagnosis   • Pregnancy   • CHTN- labetalol 50 mg QD, baseline 24 hour urine- 80 mg   • Rubella non-immune status, antepartum- MMR pp   • PCOS (polycystic ovarian syndrome)   • Elevated glycosylated hemoglobin- HgBA1c= 5.7, needs early 2 hour GTT at 20  & 28 weeks   • Gestational diabetes mellitus, class A1   • Solitary cyst of right breast: 5.7cm. Scheduled for drainage   • Gastroesophageal reflux disease with esophagitis   • Gestational diabetes   • Sacroiliac pain during pregnancy       Start Time:  Stop Time:     Interpretation A  Nonstress Test Interpretation A: Reactive (10/26/20 2320 : Zari Lora RN)

## 2020-11-03 ENCOUNTER — ROUTINE PRENATAL (OUTPATIENT)
Dept: OBSTETRICS AND GYNECOLOGY | Facility: CLINIC | Age: 30
End: 2020-11-03

## 2020-11-03 ENCOUNTER — HOSPITAL ENCOUNTER (OUTPATIENT)
Facility: HOSPITAL | Age: 30
Discharge: HOME | End: 2020-11-03
Attending: OBSTETRICS & GYNECOLOGY | Admitting: OBSTETRICS & GYNECOLOGY

## 2020-11-03 VITALS — BODY MASS INDEX: 33.36 KG/M2 | SYSTOLIC BLOOD PRESSURE: 120 MMHG | DIASTOLIC BLOOD PRESSURE: 80 MMHG | WEIGHT: 225.9 LBS

## 2020-11-03 VITALS
SYSTOLIC BLOOD PRESSURE: 110 MMHG | HEART RATE: 76 BPM | DIASTOLIC BLOOD PRESSURE: 67 MMHG | RESPIRATION RATE: 17 BRPM | TEMPERATURE: 98.2 F

## 2020-11-03 DIAGNOSIS — O10.011 PRE-EXISTING ESSENTIAL HYPERTENSION DURING PREGNANCY IN FIRST TRIMESTER: Primary | ICD-10-CM

## 2020-11-03 DIAGNOSIS — O24.419 GESTATIONAL DIABETES MELLITUS, CLASS A2: ICD-10-CM

## 2020-11-03 LAB
AMPHET+METHAMPHET UR QL: NEGATIVE
AMPHETAMINES UR QL: NEGATIVE
BARBITURATES UR QL SCN: NEGATIVE
BENZODIAZ UR QL SCN: NEGATIVE
BUPRENORPHINE SERPL-MCNC: NEGATIVE NG/ML
CANNABINOIDS SERPL QL: NEGATIVE
COCAINE UR QL: NEGATIVE
GLUCOSE UR STRIP-MCNC: NEGATIVE MG/DL
METHADONE UR QL SCN: NEGATIVE
OPIATES UR QL: NEGATIVE
OXYCODONE UR QL SCN: NEGATIVE
PCP UR QL SCN: NEGATIVE
PROPOXYPH UR QL: NEGATIVE
PROT UR STRIP-MCNC: NEGATIVE MG/DL
TRICYCLICS UR QL SCN: NEGATIVE

## 2020-11-03 PROCEDURE — 59426 ANTEPARTUM CARE ONLY: CPT | Performed by: OBSTETRICS & GYNECOLOGY

## 2020-11-03 PROCEDURE — G0463 HOSPITAL OUTPT CLINIC VISIT: HCPCS

## 2020-11-03 PROCEDURE — 59025 FETAL NON-STRESS TEST: CPT

## 2020-11-03 PROCEDURE — 80306 DRUG TEST PRSMV INSTRMNT: CPT | Performed by: OBSTETRICS & GYNECOLOGY

## 2020-11-03 NOTE — NON STRESS TEST
Es Branch, a  at 33w0d with an DAFNE of 2020, by Ultrasound, was seen at [facility] for a nonstress test.    Chief Complaint   Patient presents with   • Non-stress Test       Patient Active Problem List   Diagnosis   • Pregnancy   • CHTN- labetalol 50 mg QD, baseline 24 hour urine- 80 mg   • Rubella non-immune status, antepartum- MMR pp   • PCOS (polycystic ovarian syndrome)   • Elevated glycosylated hemoglobin- HgBA1c= 5.7, needs early 2 hour GTT at 20  & 28 weeks   • Gestational diabetes mellitus, class A2: glyburide   • Solitary cyst of right breast: 5.7cm. Scheduled for drainage   • Gastroesophageal reflux disease with esophagitis   • Sacroiliac pain during pregnancy       Start Time: 1230  Stop Time: 1500    Interpretation A  Nonstress Test Interpretation A: Reactive (20 1501 : Solange Mera, RN)  Comments A: acoustic stimulator used (20 1501 : Solange Mera, RN)

## 2020-11-03 NOTE — NURSING NOTE
Written and verbal discharge instructions given to pt.  Pt verbalized understanding and left ambulatory in stable condition.

## 2020-11-03 NOTE — PROGRESS NOTES
Here for ANT for CHTN, A2DM (glyburide) and glucometer log review.    BPP = 8/10.  NST NR.  Will go to S for prolonged monitoring.  Will increase glyburide to 5mg bid.

## 2020-11-04 NOTE — NURSING NOTE
Case Management Discharge Note      Final Note: dc home    Provided Post Acute Provider List?: N/A  Provided Post Acute Provider Quality & Resource List?: N/A    Selected Continued Care - Discharged on 11/3/2020 Admission date: 11/3/2020 - Discharge disposition: Home or Self Care    Destination    No services have been selected for the patient.              Durable Medical Equipment    No services have been selected for the patient.              Dialysis/Infusion    No services have been selected for the patient.              Home Medical Care    No services have been selected for the patient.              Therapy    No services have been selected for the patient.              Community Resources    No services have been selected for the patient.                       Final Discharge Disposition Code: 01 - home or self-care

## 2020-11-05 ENCOUNTER — TELEPHONE (OUTPATIENT)
Dept: OBSTETRICS AND GYNECOLOGY | Facility: CLINIC | Age: 30
End: 2020-11-05

## 2020-11-05 ENCOUNTER — HOSPITAL ENCOUNTER (OUTPATIENT)
Facility: HOSPITAL | Age: 30
Discharge: HOME | End: 2020-11-05
Attending: OBSTETRICS & GYNECOLOGY | Admitting: OBSTETRICS & GYNECOLOGY

## 2020-11-05 VITALS
SYSTOLIC BLOOD PRESSURE: 110 MMHG | BODY MASS INDEX: 33.18 KG/M2 | WEIGHT: 224 LBS | HEART RATE: 74 BPM | TEMPERATURE: 98.2 F | RESPIRATION RATE: 20 BRPM | HEIGHT: 69 IN | DIASTOLIC BLOOD PRESSURE: 61 MMHG

## 2020-11-05 LAB
A1 MICROGLOB PLACENTAL VAG QL: NEGATIVE
AMPHET+METHAMPHET UR QL: NEGATIVE
AMPHETAMINES UR QL: NEGATIVE
BACTERIA UR QL AUTO: ABNORMAL /HPF
BARBITURATES UR QL SCN: NEGATIVE
BENZODIAZ UR QL SCN: NEGATIVE
BILIRUB UR QL STRIP: NEGATIVE
BUPRENORPHINE SERPL-MCNC: NEGATIVE NG/ML
CANNABINOIDS SERPL QL: NEGATIVE
CLARITY UR: CLEAR
COCAINE UR QL: NEGATIVE
COLOR UR: YELLOW
GLUCOSE BLDC GLUCOMTR-MCNC: 93 MG/DL (ref 70–130)
GLUCOSE UR STRIP-MCNC: NEGATIVE MG/DL
HGB UR QL STRIP.AUTO: ABNORMAL
HYALINE CASTS UR QL AUTO: ABNORMAL /LPF
KETONES UR QL STRIP: NEGATIVE
LEUKOCYTE ESTERASE UR QL STRIP.AUTO: NEGATIVE
METHADONE UR QL SCN: NEGATIVE
NITRITE UR QL STRIP: NEGATIVE
OPIATES UR QL: NEGATIVE
OXYCODONE UR QL SCN: NEGATIVE
PCP UR QL SCN: NEGATIVE
PH UR STRIP.AUTO: 6.5 [PH] (ref 4.5–8)
PROPOXYPH UR QL: NEGATIVE
PROT UR QL STRIP: NEGATIVE
RBC # UR: ABNORMAL /HPF
REF LAB TEST METHOD: ABNORMAL
SP GR UR STRIP: 1.02 (ref 1–1.03)
SQUAMOUS #/AREA URNS HPF: ABNORMAL /HPF
TRICYCLICS UR QL SCN: NEGATIVE
UROBILINOGEN UR QL STRIP: ABNORMAL
WBC UR QL AUTO: ABNORMAL /HPF

## 2020-11-05 PROCEDURE — 59025 FETAL NON-STRESS TEST: CPT | Performed by: OBSTETRICS & GYNECOLOGY

## 2020-11-05 PROCEDURE — 80306 DRUG TEST PRSMV INSTRMNT: CPT | Performed by: OBSTETRICS & GYNECOLOGY

## 2020-11-05 PROCEDURE — 84112 EVAL AMNIOTIC FLUID PROTEIN: CPT | Performed by: OBSTETRICS & GYNECOLOGY

## 2020-11-05 PROCEDURE — 81001 URINALYSIS AUTO W/SCOPE: CPT | Performed by: OBSTETRICS & GYNECOLOGY

## 2020-11-05 PROCEDURE — 82962 GLUCOSE BLOOD TEST: CPT

## 2020-11-05 PROCEDURE — G0463 HOSPITAL OUTPT CLINIC VISIT: HCPCS

## 2020-11-05 PROCEDURE — 59025 FETAL NON-STRESS TEST: CPT

## 2020-11-05 NOTE — TELEPHONE ENCOUNTER
"Call received from patient. 33W2D. Patient states \" Every time I go from sitting to standing, I feel some fluid leaking out , I have went through three pads today\".  Spoke with Dr. Kim reguarding patient. Per Dr. Kim patient needs [facility]. Patient informed , and patient on the the way [facility]. [facility] called and made aware that patient is on way.   "

## 2020-11-05 NOTE — NON STRESS TEST
Es Branch, a  at 33w2d with an DAFNE of 2020, by Ultrasound, was seen at [facility] for a nonstress test.    Chief Complaint   Patient presents with   • Rupture of Membranes       Patient Active Problem List   Diagnosis   • Pregnancy   • CHTN- labetalol 50 mg QD, baseline 24 hour urine- 80 mg   • Rubella non-immune status, antepartum- MMR pp   • PCOS (polycystic ovarian syndrome)   • Elevated glycosylated hemoglobin- HgBA1c= 5.7, needs early 2 hour GTT at 20  & 28 weeks   • Gestational diabetes mellitus, class A2: glyburide   • Solitary cyst of right breast: 5.7cm. Scheduled for drainage   • Gastroesophageal reflux disease with esophagitis   • Sacroiliac pain during pregnancy       Start Time:   Stop Time:     Interpretation A  Nonstress Test Interpretation A: Reactive (20 1730 : Amarilis Sebastian RN)      Patient Care Team:  Rosetta Adams MD as PCP - General (Family Medicine)    Patient new to practice? No  Patient new to examiner? No  Patient referred? Yes  -----------------------------------------------------HISTORY---------------------------------------------------    Chief Complaint:   Chief Complaint   Patient presents with   • Rupture of Membranes     New problem to examiner? Yes    30 y.o.  Patient's last menstrual period was 2020 (exact date).    HPI: History of Present Illness      Pt thought she broke her water recently since she has been leaking fluid all day long.  Pt denies fever, bleeding, or discharge.      PFSH:   1.    Past Medical History:   Diagnosis Date   • Anemia    • Asthma    • Female infertility    • Gestational diabetes    • Headache    • Hypertension    • PCOS (polycystic ovarian syndrome) 2013   • Polycystic ovary syndrome      2.   Family History   Problem Relation Age of Onset   • Hypertension Mother    • Ovarian cancer Mother    • Hypertension Maternal Grandmother    • Diabetes Maternal Grandfather    •  Hypertension Paternal Grandmother    • Diabetes Paternal Grandfather    • Deep vein thrombosis Maternal Aunt    • Breast cancer Neg Hx    • Colon cancer Neg Hx    • Pulmonary embolism Neg Hx    • Anesthesia problems Neg Hx          PAST HISTORY REVIEWED:  1.   Past Surgical History:   Procedure Laterality Date   • ADENOIDECTOMY     • DIAGNOSTIC LAPAROSCOPY N/A 2/20/2020    Procedure: DIAGNOSTIC LAPAROSCOPY;  Surgeon: Imelda Rayo MD;  Location: [facility];  Service: Obstetrics/Gynecology;  Laterality: N/A;   • TONSILLECTOMY  02/2008    adnoids also   • US GUIDED CYST ASPIRATION BREAST N/A 7/8/2020      2. No current facility-administered medications for this encounter.     Current Outpatient Medications:   •  Blood Glucose Monitoring Suppl (Accu-Chek Ana Plus) w/Device kit, CHECK BLOOD SUGAR 4 TIMES DAILY, Disp: , Rfl:   •  ferrous gluconate (FERGON) 324 MG tablet, Take 1 tablet by mouth Daily With Breakfast. (Patient taking differently: Take 324 mg by mouth 3 (Three) Times a Day.), Disp: 30 tablet, Rfl: 6  •  glucose blood test strip, Check blood sugars 4 times daily, Disp: 120 each, Rfl: 6  •  glucose monitor monitoring kit, Check blood sugar 4 times daily, Disp: 1 each, Rfl: 1  •  glyburide (DIAbeta) 2.5 MG tablet, Take one by mouth at breakfast., Disp: 30 tablet, Rfl: 6  •  glyburide (DIAbeta) 5 MG tablet, Take 1 tablet by mouth Daily With Dinner., Disp: 30 tablet, Rfl: 6  •  labetalol (NORMODYNE) 100 MG tablet, One half of tablet by mouth daily (Patient taking differently: Take 50 mg by mouth. One half of tablet by mouth daily), Disp: 30 tablet, Rfl: 6  •  Lancets misc, Check blood sugar 4 times daily, Disp: 120 each, Rfl: 6  •  pantoprazole (PROTONIX) 40 MG EC tablet, Take 1 tablet by mouth Daily., Disp: 30 tablet, Rfl: 6  •  Prenat-Fe Carbonyl-FA-Omega 3 (ONE-A-DAY WOMENS PRENATAL 1) 28-0.8-235 MG capsule, Take 1 capsule by mouth Daily., Disp: , Rfl:   •  sucralfate (CARAFATE) 1 g tablet, Take 1  "tablet by mouth 4 (Four) Times a Day. Dissolve in 10 cc water, Disp: 120 tablet, Rfl: 5  3. No Known Allergies    ROS:  Review of Systems   Constitutional: Negative.    HENT: Negative.    Respiratory: Negative.    Cardiovascular: Negative.    Gastrointestinal: neg  Endocrine: Negative.    Genitourinary: Negative  Musculoskeletal: Negative.    Skin: Negative.    Allergic/Immunologic: Negative.    Neurological: Negative.    Hematological: Negative.    Psychiatric/Behavioral: Negative.      -----------------------------------------------PHYSICAL EXAM----------------------------------------------    Vital Signs: /61   Pulse 74   Temp 98.2 °F (36.8 °C) (Oral)   Resp 20   Ht 175.3 cm (69\")   Wt 102 kg (224 lb)   LMP 03/17/2020 (Exact Date)   BMI 33.08 kg/m²    Flowsheet Rows      First Filed Value   Admission Height  175.3 cm (69\") Documented at 11/05/2020 1528   Admission Weight  102 kg (224 lb) Documented at 11/05/2020 1528          Physical Exam:  Physical Exam   Constitutional: She is oriented to person, place, and time.   She appears well-developed and well-nourished.   HENT:   Head: Normocephalic and atraumatic.   Eyes: EOM are normal.   Neck: Normal range of motion.   Cardiovascular: Normal rate.   Pulmonary/Chest: Effort normal.   Abdominal: Soft. She exhibits no distension and no mass.   There is no tenderness.   There is no guarding.   Genitourinary: No vaginal discharge found.   Cervix checked: no  Musculoskeletal: Normal range of motion. She exhibits no edema,   tenderness or deformity.   Neurological: She is alert and oriented to person, place, and time.   Skin: Skin is warm and dry. No rash noted. No erythema. No pallor.   Psychiatric: She has a normal mood and affect. Her behavior is normal.   Judgment and thought content normal.     -----------------------------------------------MEDICAL DECISION MAKING-----------------------------      DATA Review & labs ordered:     1.   Lab Results (last 24 " hours)     Procedure Component Value Units Date/Time    POC Glucose Once [[id number]]  (Normal) Collected: 11/05/20 1558    Specimen: Blood Updated: 11/05/20 1607     Glucose 93 mg/dL     Urine Drug Screen - [[id number]]  (Normal) Collected: 11/05/20 1537    Specimen: Urine Updated: 11/05/20 1552     THC, Screen, Urine Negative     Phencyclidine (PCP), Urine Negative     Cocaine Screen, Urine Negative     Methamphetamine, Ur Negative     Opiate Screen Negative     Amphetamine Screen, Urine Negative     Benzodiazepine Screen, Urine Negative     Tricyclic Antidepressants Screen Negative     Methadone Screen, Urine Negative     Barbiturates Screen, Urine Negative     Oxycodone Screen, Urine Negative     Propoxyphene Screen Negative     Buprenorphine, Screen, Urine Negative    Narrative:      Urine drug screen results are to be used for medical purposes only.  They are not to be used for legal purposes such as employment testing.  Negative results do not necessarily mean the complete absence of a subtance, but rather that the result is less than the cutoff for that substance.  Positive results are unconfirmed and considered Preliminary Positive.  [facility] does not automatically confirm Postitive Unconfirmed results.  The physician may request (order) an Unconfirmed Positive result to be sent out for confirmation.      Negative Thresholds for Drugs Screened:    THC screen, urine                          50 ng/ml  Phenycyclidine (PCP), urine                25 ng/ml  Cocaine screen, urine                     150 ng/ml  Methamphetamine, urine                    500 ng/ml  Opiate screen, urine                      100 ng/ml  Amphetamine screen, urine                 500 ng/ml  Benzodiazepine screen, urine              150 ng/ml  Tricyclic Antidepressants screen, urine   300 ng/ml  Methadone screen, urine                   200 ng/ml  Barbiturates screen, urine                200 ng/ml  Oxycodone screen, urine                    100 ng/ml  Propoxyphene screen, urine                300 ng/ml  Buprenorphine screen, urine                10 ng/ml    PAMG-1, Rapid Assay For ROM - Amniotic Fluid, [[id number]]  (Normal) Collected: 20    Specimen: Amniotic Fluid Updated: 20     Rupture of Membranes Negative    Urinalysis, Microscopic Only - Urine, Clean Catch [[id number]]  (Abnormal) Collected: 20    Specimen: Urine, Clean Catch Updated: 20 1550     RBC, UA 0-2 /HPF      WBC, UA 3-5 /HPF      Bacteria, UA 1+ /HPF      Squamous Epithelial Cells, UA 3-6 /HPF      Hyaline Casts, UA 0-2 /LPF      Methodology Manual Light Microscopy    Urinalysis With Microscopic If Indicated (No Culture) - Urine, Clean Catch [[id number]]  (Abnormal) Collected: 20    Specimen: Urine, Clean Catch Updated: 20 1543     Color, UA Yellow     Appearance, UA Clear     pH, UA 6.5     Specific Gravity, UA 1.025     Glucose, UA Negative     Ketones, UA Negative     Bilirubin, UA Negative     Blood, UA Trace     Protein, UA Negative     Leuk Esterase, UA Negative     Nitrite, UA Negative     Urobilinogen, UA 0.2 E.U./dL        2.   Imaging Results (Last 24 Hours)     ** No results found for the last 24 hours. **        3.   ECG/EMG Results (most recent)     None            NST INTERPRETATION:    Indication for test:   Chief Complaint   Patient presents with   • Rupture of Membranes     Interpretation: reactive  Fetal Heart Rate Baseline: 140's  Periodic Changes: present  Contractions present? no  NST duration:  Over 20 minutes....              IMPRESSION:  30 y.o.  @ 33+wks                             Not ruptured, false labor    Patient Active Problem List   Diagnosis   • Pregnancy   • CHTN- labetalol 50 mg QD, baseline 24 hour urine- 80 mg   • Rubella non-immune status, antepartum- MMR pp   • PCOS (polycystic ovarian syndrome)   • Elevated glycosylated hemoglobin- HgBA1c= 5.7, needs early 2 hour GTT at  20  & 28 weeks   • Gestational diabetes mellitus, class A2: glyburide   • Solitary cyst of right breast: 5.7cm. Scheduled for drainage   • Gastroesophageal reflux disease with esophagitis   • Sacroiliac pain during pregnancy       Established problem/s? No   Worsening? No  New Problem/s? Yes   Additional workup planned? No    PLAN:      Resume normal activity as tolerated.    Please keep your next regularly scheduled appointment.    Call your doctor if you notice: increased leakage or fluid from the vagina, contractions more than 10 per 1 hour, decreased fetal movement, low back pain or cramping that doesn't go away, bleeding from vaginal area, difficulty peeing, pain with peeing, difficulty breathing, new calf pain, headache that doesn't go away or vision change.    Normal diet as tolerated.    Perform a kick count once a day at approximately the same time each day. Baby's activity levels are usually higher in the evening after dinner.To perform a kick count, lie on your left side and focus on your baby's movements: rolls, kicks, or flutters. Record the number of minutes it takes to feel your baby move ten times. You may stop counting after your baby has moved 5 times.    If your baby does not move at least 5 times in 1 hr or if there is a sudden decrease in movement, call the office ([phone])      ASSESSMENT:      PLAN: RETURN TO OFFICE AS SCHEDULED, CALL FOR PROBLEMS    Time:  Total face-to-face/floor time 20 min.  Time spent in counseling 20 min. Counseling included the following topics with prognosis, differential diagnosis, risks, benefits of treatment, instructions, complicance and/or risk reduction and alternatives: premature rupture of membranes,  labor.         Levon Marcos MD  2020  21:17 EST

## 2020-11-09 ENCOUNTER — ROUTINE PRENATAL (OUTPATIENT)
Dept: OBSTETRICS AND GYNECOLOGY | Facility: CLINIC | Age: 30
End: 2020-11-09

## 2020-11-09 VITALS — BODY MASS INDEX: 33.37 KG/M2 | DIASTOLIC BLOOD PRESSURE: 70 MMHG | WEIGHT: 226 LBS | SYSTOLIC BLOOD PRESSURE: 132 MMHG

## 2020-11-09 DIAGNOSIS — Z28.39 RUBELLA NON-IMMUNE STATUS, ANTEPARTUM: ICD-10-CM

## 2020-11-09 DIAGNOSIS — O24.419 GDM, CLASS A2: ICD-10-CM

## 2020-11-09 DIAGNOSIS — O09.899 RUBELLA NON-IMMUNE STATUS, ANTEPARTUM: ICD-10-CM

## 2020-11-09 DIAGNOSIS — Z34.93 PRENATAL CARE IN THIRD TRIMESTER: Primary | ICD-10-CM

## 2020-11-09 DIAGNOSIS — O10.011 PRE-EXISTING ESSENTIAL HYPERTENSION DURING PREGNANCY IN FIRST TRIMESTER: ICD-10-CM

## 2020-11-09 LAB
GLUCOSE UR STRIP-MCNC: NEGATIVE MG/DL
PROT UR STRIP-MCNC: NEGATIVE MG/DL

## 2020-11-09 PROCEDURE — 0502F SUBSEQUENT PRENATAL CARE: CPT | Performed by: NURSE PRACTITIONER

## 2020-11-09 NOTE — PROGRESS NOTES
OB follow up > 20 weeks    Chief Complaint   Patient presents with   • Routine Prenatal Visit       Es Branch is a 30 y.o.   33w6d being seen today for her obstetrical visit.  Patient reports no complaints. Reports she is feeling well. She has her completed BS log and is taking Glyburide 2.5mg BID. She is working at "[employer] She reports a steady increase in COVID 19 numbers. She is asking to take maternity leave. Taking prenatal vitamins: Yes      Review of Systems  Constitutional: neg fatigue  Cardiovascular: neg edema  Gastrointestinal: neg n/v  Genitourinary: Negative for contractions, cramping, vaginal bleeding, or SROM.   Fetal movement: normal    /70   Wt 103 kg (226 lb)   LMP 2020 (Exact Date)   BMI 33.37 kg/m²     FHT: present BPM   Uterine Size: Size equals dates        Assessment    1) pregnancy at 33w6d- US IMP: BPP 8/8. JULIAN normal.     2) GDMA2. Taking Glyburide 5mg BID. Her BS logs shows good control. NST/BPP 10/10     3) CHTN- Baseline 24-hour urine was 80 mg.  Pt taking Labetalol 50mg daily. BP good control. Neg proteinuria.      4) RNI-Offer MMR vaccine postpartum      5) Anemia- Hgb 10.3g/dL. Taking iron daily.       5) S/P Flu and tdap shot.     6) Right breast cyst: S/P cyst drainage. No current c/o.      7) Former smoker- Quit!      8) Heartburn- Taking Carafate and Protonix. Followed by GI.      9) S/P Tdap. Patient advised to have the Tdap shot in the later part of pregnancy to help protect against whooping cough.  Also advised that FOB and other adults who come in contact with the infant should also be vaccinated with Tdap.        10) COVID precautions given. She is working for "[employer]" and she is getting testing weekly.  I saw the patient with a face mask, gloves and eye protection  The patient herself was masked.  Social distancing was observed as appropriate. She is asking to start maternity leave now. Note provided.      11) S>D- Growth 57% 10/26/20  compared to growth 68% on 9/28/20.          Plan    Continue prenatal vitamins  Reviewed this stage of pregnancy  Problem list updated   Follow up in 1 weeks for OB tummy, NST/BPP , and rev of BS log     María Elena Arthur, APRN  11/9/2020  14:22 EST

## 2020-11-15 ENCOUNTER — HOSPITAL ENCOUNTER (OUTPATIENT)
Facility: HOSPITAL | Age: 30
Discharge: HOME | End: 2020-11-16
Attending: OBSTETRICS & GYNECOLOGY | Admitting: NURSE PRACTITIONER

## 2020-11-15 LAB
ALBUMIN SERPL-MCNC: 3.7 G/DL (ref 3.5–5.2)
ALBUMIN/GLOB SERPL: 1.4 G/DL
ALP SERPL-CCNC: 177 U/L (ref 39–117)
ALT SERPL W P-5'-P-CCNC: 6 U/L (ref 1–33)
AMPHET+METHAMPHET UR QL: NEGATIVE
AMPHETAMINES UR QL: NEGATIVE
ANION GAP SERPL CALCULATED.3IONS-SCNC: 10.9 MMOL/L (ref 5–15)
AST SERPL-CCNC: 9 U/L (ref 1–32)
BACTERIA UR QL AUTO: ABNORMAL /HPF
BARBITURATES UR QL SCN: NEGATIVE
BENZODIAZ UR QL SCN: NEGATIVE
BILIRUB SERPL-MCNC: <0.2 MG/DL (ref 0–1.2)
BILIRUB UR QL STRIP: NEGATIVE
BUN SERPL-MCNC: 6 MG/DL (ref 6–20)
BUN/CREAT SERPL: 10 (ref 7–25)
BUPRENORPHINE SERPL-MCNC: NEGATIVE NG/ML
CALCIUM SPEC-SCNC: 9.4 MG/DL (ref 8.6–10.5)
CANNABINOIDS SERPL QL: NEGATIVE
CHLORIDE SERPL-SCNC: 105 MMOL/L (ref 98–107)
CLARITY UR: ABNORMAL
CO2 SERPL-SCNC: 21.1 MMOL/L (ref 22–29)
COCAINE UR QL: NEGATIVE
COLOR UR: YELLOW
CREAT SERPL-MCNC: 0.6 MG/DL (ref 0.57–1)
DEPRECATED RDW RBC AUTO: 43.8 FL (ref 37–54)
ERYTHROCYTE [DISTWIDTH] IN BLOOD BY AUTOMATED COUNT: 13.3 % (ref 12.3–15.4)
GFR SERPL CREATININE-BSD FRML MDRD: 117 ML/MIN/1.73
GLOBULIN UR ELPH-MCNC: 2.7 GM/DL
GLUCOSE BLDC GLUCOMTR-MCNC: 86 MG/DL (ref 70–130)
GLUCOSE SERPL-MCNC: 94 MG/DL (ref 65–99)
GLUCOSE UR STRIP-MCNC: NEGATIVE MG/DL
HCT VFR BLD AUTO: 32.3 % (ref 34–46.6)
HGB BLD-MCNC: 10.6 G/DL (ref 12–15.9)
HGB UR QL STRIP.AUTO: ABNORMAL
HYALINE CASTS UR QL AUTO: ABNORMAL /LPF
KETONES UR QL STRIP: ABNORMAL
LEUKOCYTE ESTERASE UR QL STRIP.AUTO: NEGATIVE
MCH RBC QN AUTO: 29.4 PG (ref 26.6–33)
MCHC RBC AUTO-ENTMCNC: 32.8 G/DL (ref 31.5–35.7)
MCV RBC AUTO: 89.7 FL (ref 79–97)
METHADONE UR QL SCN: NEGATIVE
NITRITE UR QL STRIP: NEGATIVE
OPIATES UR QL: NEGATIVE
OXYCODONE UR QL SCN: NEGATIVE
PCP UR QL SCN: NEGATIVE
PH UR STRIP.AUTO: 6 [PH] (ref 4.5–8)
PLATELET # BLD AUTO: 324 10*3/MM3 (ref 140–450)
PMV BLD AUTO: 10.3 FL (ref 6–12)
POTASSIUM SERPL-SCNC: 4.1 MMOL/L (ref 3.5–5.2)
PROPOXYPH UR QL: NEGATIVE
PROT SERPL-MCNC: 6.4 G/DL (ref 6–8.5)
PROT UR QL STRIP: NEGATIVE
RBC # BLD AUTO: 3.6 10*6/MM3 (ref 3.77–5.28)
RBC # UR: ABNORMAL /HPF
REF LAB TEST METHOD: ABNORMAL
SARS-COV-2 RNA PNL SPEC NAA+PROBE: NOT DETECTED
SODIUM SERPL-SCNC: 137 MMOL/L (ref 136–145)
SP GR UR STRIP: 1.03 (ref 1–1.03)
SQUAMOUS #/AREA URNS HPF: ABNORMAL /HPF
TRICYCLICS UR QL SCN: NEGATIVE
URATE SERPL-MCNC: 4.9 MG/DL (ref 2.4–5.7)
UROBILINOGEN UR QL STRIP: ABNORMAL
WBC # BLD AUTO: 13.01 10*3/MM3 (ref 3.4–10.8)
WBC UR QL AUTO: ABNORMAL /HPF

## 2020-11-15 PROCEDURE — C9803 HOPD COVID-19 SPEC COLLECT: HCPCS

## 2020-11-15 PROCEDURE — 82962 GLUCOSE BLOOD TEST: CPT

## 2020-11-15 PROCEDURE — 85027 COMPLETE CBC AUTOMATED: CPT | Performed by: NURSE PRACTITIONER

## 2020-11-15 PROCEDURE — 84550 ASSAY OF BLOOD/URIC ACID: CPT | Performed by: NURSE PRACTITIONER

## 2020-11-15 PROCEDURE — 80053 COMPREHEN METABOLIC PANEL: CPT | Performed by: NURSE PRACTITIONER

## 2020-11-15 PROCEDURE — G0463 HOSPITAL OUTPT CLINIC VISIT: HCPCS

## 2020-11-15 PROCEDURE — 80306 DRUG TEST PRSMV INSTRMNT: CPT | Performed by: NURSE PRACTITIONER

## 2020-11-15 PROCEDURE — 87635 SARS-COV-2 COVID-19 AMP PRB: CPT | Performed by: OBSTETRICS & GYNECOLOGY

## 2020-11-15 PROCEDURE — 81001 URINALYSIS AUTO W/SCOPE: CPT | Performed by: NURSE PRACTITIONER

## 2020-11-15 RX ORDER — FERROUS GLUCONATE 324(37.5)
324 TABLET ORAL
Status: DISCONTINUED | OUTPATIENT
Start: 2020-11-15 | End: 2020-11-16 | Stop reason: HOSPADM

## 2020-11-15 RX ORDER — GLYBURIDE 2.5 MG/1
TABLET ORAL
Status: COMPLETED
Start: 2020-11-15 | End: 2020-11-15

## 2020-11-15 RX ORDER — GLIPIZIDE 5 MG/1
2.5 TABLET ORAL
Status: DISCONTINUED | OUTPATIENT
Start: 2020-11-16 | End: 2020-11-16 | Stop reason: HOSPADM

## 2020-11-15 RX ORDER — LABETALOL 100 MG/1
50 TABLET, FILM COATED ORAL DAILY
Status: DISCONTINUED | OUTPATIENT
Start: 2020-11-15 | End: 2020-11-16 | Stop reason: HOSPADM

## 2020-11-15 RX ORDER — PANTOPRAZOLE SODIUM 40 MG/1
40 TABLET, DELAYED RELEASE ORAL
Status: DISCONTINUED | OUTPATIENT
Start: 2020-11-16 | End: 2020-11-16 | Stop reason: HOSPADM

## 2020-11-15 RX ADMIN — FERROUS GLUCONATE TAB 324 MG (37.5 MG ELEMENTAL IRON) 324 MG: 324 (37.5 FE) TAB at 21:16

## 2020-11-15 RX ADMIN — GLYBURIDE 2.5 MG: 2.5 TABLET ORAL at 21:37

## 2020-11-15 RX ADMIN — LABETALOL HYDROCHLORIDE 50 MG: 100 TABLET, FILM COATED ORAL at 21:16

## 2020-11-16 VITALS
BODY MASS INDEX: 33.18 KG/M2 | HEIGHT: 69 IN | OXYGEN SATURATION: 98 % | WEIGHT: 224 LBS | TEMPERATURE: 98.1 F | RESPIRATION RATE: 18 BRPM | HEART RATE: 70 BPM | DIASTOLIC BLOOD PRESSURE: 68 MMHG | SYSTOLIC BLOOD PRESSURE: 123 MMHG

## 2020-11-16 LAB
GLUCOSE BLDC GLUCOMTR-MCNC: 100 MG/DL (ref 70–130)
GLUCOSE BLDC GLUCOMTR-MCNC: 107 MG/DL (ref 70–130)
GLUCOSE BLDC GLUCOMTR-MCNC: 78 MG/DL (ref 70–130)
PROT 24H UR-MRATE: 190 MG/24HOURS (ref 28–141)

## 2020-11-16 PROCEDURE — 81050 URINALYSIS VOLUME MEASURE: CPT | Performed by: OBSTETRICS & GYNECOLOGY

## 2020-11-16 PROCEDURE — 84156 ASSAY OF PROTEIN URINE: CPT | Performed by: OBSTETRICS & GYNECOLOGY

## 2020-11-16 PROCEDURE — 59025 FETAL NON-STRESS TEST: CPT

## 2020-11-16 PROCEDURE — 99219 PR INITIAL OBSERVATION CARE/DAY 50 MINUTES: CPT | Performed by: OBSTETRICS & GYNECOLOGY

## 2020-11-16 PROCEDURE — 82962 GLUCOSE BLOOD TEST: CPT

## 2020-11-16 RX ADMIN — FERROUS GLUCONATE TAB 324 MG (37.5 MG ELEMENTAL IRON) 324 MG: 324 (37.5 FE) TAB at 17:38

## 2020-11-16 RX ADMIN — LABETALOL HYDROCHLORIDE 50 MG: 100 TABLET, FILM COATED ORAL at 08:00

## 2020-11-16 RX ADMIN — PANTOPRAZOLE SODIUM 40 MG: 40 TABLET, DELAYED RELEASE ORAL at 08:00

## 2020-11-16 RX ADMIN — FERROUS GLUCONATE TAB 324 MG (37.5 MG ELEMENTAL IRON) 324 MG: 324 (37.5 FE) TAB at 08:01

## 2020-11-16 NOTE — NON STRESS TEST
Es Branch, a  at 34w6d with an DAFNE of 2020, by Ultrasound, was seen at [facility] for a nonstress test.    Chief Complaint   Patient presents with   • Elevated Blood Pressure     BP's at home of 140/90, 159/100- complained of dizziness with these BP's       Patient Active Problem List   Diagnosis   • Pregnancy   • CHTN- labetalol 50 mg QD, baseline 24 hour urine- 80 mg   • Rubella non-immune status, antepartum- MMR pp   • PCOS (polycystic ovarian syndrome)   • Elevated glycosylated hemoglobin- HgBA1c= 5.7, needs early 2 hour GTT at 20  & 28 weeks   • Gestational diabetes mellitus, class A2: glyburide   • Solitary cyst of right breast: 5.7cm. Scheduled for drainage   • Gastroesophageal reflux disease with esophagitis   • Sacroiliac pain during pregnancy       Start Time:   Stop Time:     Interpretation A  Nonstress Test Interpretation A: Reactive (20 1530 : Haley Pepe RN)

## 2020-11-16 NOTE — SIGNIFICANT NOTE
Discharge instructions reviewed with patient and significant other. Both verbalized understanding and questions answered. Pt ambulated to exit without any distress.

## 2020-11-16 NOTE — H&P
Patient Care Team:  Rosetta Adams MD as PCP - General (Family Medicine)    Chief complaint elevated blood pressure and dizziness       HPI: This is a  2 para 0-0-1-0 who presented at 34-5/7 weeks complaining of elevated blood pressure and dizziness at home.  She has known chronic hypertension and takes labetalol 50 mg daily.  She had a baseline 24-hour urine which showed 80 mg of total protein.  She is rubella nonimmune and we plan an MMR postpartum.  She takes glyburide for gestational diabetes mellitus A2 and has GERD.  She reports feeling finally starting yesterday.  She was at her mother's house working on flower arrangements when she started feeling lightheaded and dizzy.  She took her blood pressure and it was elevated.  Because of the dizziness she presented to labor and delivery complaining of the above symptoms.  She was admitted for observation overnight, serial blood pressures and a 24-hour urine.    ROS:   Constitutional: Negative for appetite change, fever and unexpected weight change.   Respiratory: Negative for cough and shortness of breath.    Cardiovascular: Negative for chest pain and palpitations.   Gastrointestinal: Negative for abdominal pain (including RUQ pain), constipation, diarrhea, nausea and vomiting.   Endocrine: Negative.    Genitourinary: Negative for dyspareunia, pelvic pain and vaginal discharge.   Skin: Negative.    Neurological: Negative for dizziness and headaches.   Hematological: Negative.    Psychiatric/Behavioral: Negative for dysphoric mood and sleep disturbance. The patient is not nervous/anxious.        PMH:   Past Medical History:   Diagnosis Date   • Anemia    • Asthma    • Female infertility    • Gestational diabetes    • Headache    • Hypertension    • PCOS (polycystic ovarian syndrome) 2013   • Polycystic ovary syndrome          PSH:   Past Surgical History:   Procedure Laterality Date   • ADENOIDECTOMY     • DIAGNOSTIC LAPAROSCOPY N/A 2020     Procedure: DIAGNOSTIC LAPAROSCOPY;  Surgeon: Imelda Rayo MD;  Location: [facility];  Service: Obstetrics/Gynecology;  Laterality: N/A;   • TONSILLECTOMY  2008    adnoids also   • US GUIDED CYST ASPIRATION BREAST N/A 2020       SoHx:   Social History     Socioeconomic History   • Marital status:      Spouse name: Not on file   • Number of children: Not on file   • Years of education: Not on file   • Highest education level: Not on file   Occupational History   • Occupation: cna     Employer: [employer]   Social Needs   • Financial resource strain: Not hard at all   • Food insecurity     Worry: Never true     Inability: Never true   • Transportation needs     Medical: No     Non-medical: No   Tobacco Use   • Smoking status: Former Smoker     Types: Cigarettes     Quit date: 2020     Years since quittin.4   • Smokeless tobacco: Never Used   Substance and Sexual Activity   • Alcohol use: Not Currently   • Drug use: No   • Sexual activity: Yes     Partners: Male     Birth control/protection: None   Lifestyle   • Physical activity     Days per week: 4 days     Minutes per session: 60 min   • Stress: Not at all       FHx:   Family History   Problem Relation Age of Onset   • Hypertension Mother    • Ovarian cancer Mother    • Hypertension Maternal Grandmother    • Diabetes Maternal Grandfather    • Hypertension Paternal Grandmother    • Diabetes Paternal Grandfather    • Deep vein thrombosis Maternal Aunt    • Breast cancer Neg Hx    • Colon cancer Neg Hx    • Pulmonary embolism Neg Hx    • Anesthesia problems Neg Hx        PGyn Hx: Deferred    POBHx: -0-1-0    Allergies: Patient has no known allergies.    Medications:   No current facility-administered medications on file prior to encounter.      Current Outpatient Medications on File Prior to Encounter   Medication Sig Dispense Refill   • Blood Glucose Monitoring Suppl (Accu-Chek Ana Plus) w/Device kit CHECK BLOOD SUGAR 4  TIMES DAILY     • ferrous gluconate (FERGON) 324 MG tablet Take 1 tablet by mouth Daily With Breakfast. (Patient taking differently: Take 324 mg by mouth 3 (Three) Times a Day.) 30 tablet 6   • glucose blood test strip Check blood sugars 4 times daily 120 each 6   • glucose monitor monitoring kit Check blood sugar 4 times daily 1 each 1   • glyburide (DIAbeta) 5 MG tablet Take 1 tablet by mouth Daily With Dinner. (Patient taking differently: Take 5 mg by mouth 2 (Two) Times a Day With Meals.) 30 tablet 6   • labetalol (NORMODYNE) 100 MG tablet One half of tablet by mouth daily (Patient taking differently: Take 50 mg by mouth. One half of tablet by mouth daily) 30 tablet 6   • Lancets misc Check blood sugar 4 times daily 120 each 6   • pantoprazole (PROTONIX) 40 MG EC tablet Take 1 tablet by mouth Daily. 30 tablet 6   • Prenat-Fe Carbonyl-FA-Omega 3 (ONE-A-DAY WOMENS PRENATAL 1) 28-0.8-235 MG capsule Take 1 capsule by mouth Daily.     • sucralfate (CARAFATE) 1 g tablet Take 1 tablet by mouth 4 (Four) Times a Day. Dissolve in 10 cc water 120 tablet 5             Vital Signs  Temp:  [98.1 °F (36.7 °C)-98.6 °F (37 °C)] 98.1 °F (36.7 °C)  Heart Rate:  [76-96] 83  Resp:  [18] 18  BP: (114-147)/(77-94) 135/81    Physical Exam:  Constitutional: She is oriented to person, place, and time. She appears well-developed and well-nourished.   HENT:   Head: Normocephalic and atraumatic.   Cardiovascular: Normal rate and regular rhythm.    Pulmonary/Chest: Effort normal. No respiratory distress.   Abdominal: Soft. Bowel sounds are normal. There is no tenderness. Gravid uterus. There is no rebound and no guarding.   Musculoskeletal: Normal range of motion. No excessive lower extremity swelling.  Neurological: She is alert and oriented to person, place, and time. DTRs 2+ lower extremity.  Skin: Skin is warm and dry.   Psychiatric: She has a normal mood and affect. Her behavior is normal. Judgment and thought content normal.   Nursing  note and vitals reviewed.  Debilities/Disabilities Identified: None  Emotional Behavior: Appropriate    Labs: Blood sugars well controlled with fasting and 2-hour postprandials today.  AST and ALT were normal, hemoglobin is 10.6, platelets 324,000.      Assessment/Plan: IUP at 34 weeks and 6 days, chronic hypertension rule out preeclampsia.  Gestational diabetes mellitus A2 well-controlled.  GERD.  Anemia.    24-hour urine up at 1740 this afternoon.  At that point she can go ahead and leave and follow-up with the results.  Her blood pressure has been well controlled here and her blood sugars are normal.  She is not symptomatic anymore.  She has a follow-up appointment on Thursday for biophysical profile and NST in the office.    I discussed the patients findings and my recommendations with patient and family.     Murphy Wood MD  11/16/20  15:26 EST

## 2020-11-16 NOTE — PLAN OF CARE
Problem: Adult Inpatient Plan of Care  Goal: Plan of Care Review  Outcome: Met  Goal: Patient-Specific Goal (Individualized)  Outcome: Met  Goal: Absence of Hospital-Acquired Illness or Injury  Outcome: Met  Intervention: Identify and Manage Fall Risk  Recent Flowsheet Documentation  Taken 11/16/2020 1500 by Haley Pepe RN  Safety Promotion/Fall Prevention: safety round/check completed  Goal: Optimal Comfort and Wellbeing  Outcome: Met  Intervention: Provide Person-Centered Care  Recent Flowsheet Documentation  Taken 11/16/2020 1500 by Haley Pepe RN  Trust Relationship/Rapport:   care explained   choices provided   reassurance provided   thoughts/feelings acknowledged  Goal: Readiness for Transition of Care  Outcome: Met   Goal Outcome Evaluation:

## 2020-11-16 NOTE — DISCHARGE SUMMARY
Date of Admission: 11/15/2020  5:29 PM      Date of Discharge:  2020    Admitting Service: [facility]    Admission Diagnosis: Chronic hypertension, gestational diabetes mellitus A2, GERD with esophagitis, anemia antepartum, dizziness  Discharge Diagnosis: Same    Presenting Problem/History of Present Illness  No admission diagnoses are documented for this encounter.     Hospital Course  Patient is a 30 y.o. female presented with dizziness and elevated blood pressure.  This is a  2 para 0-0-1-0 at 34-5/7 weeks admitted with dizziness.  She checked her blood pressure at home and it was elevated and erratic.  She has known chronic hypertension and takes labetalol daily.  Her prenatal care is complicated by A2 diabetes as well.  She is on iron for antepartum anemia.  She also has GERD and is being treated with proton pump inhibitors as well as Carafate.  She denies any headache or right upper quadrant pain but was admitted overnight for repeat 24-hour urine.  Her blood pressures have been quite steady while she has been in the hospital as well as her blood glucose has been normal.  She feels quite well and is up ambulating without any dizziness.  She feels like she would do fine at home.    Procedures Performed         Consults:   Consults     No orders found for last 30 day(s).          Pertinent Test Results: CMP, CBC is normal for preeclampsia labs.  I will let the patient go home before 24-hour urine results come back.    Condition on Discharge: Stable    Vital Signs  Temp:  [98.1 °F (36.7 °C)-98.6 °F (37 °C)] 98.1 °F (36.7 °C)  Heart Rate:  [76-96] 83  Resp:  [18] 18  BP: (114-147)/(77-94) 135/81    Physical Exam:   See H&P which was done at the same time.    Discharge Disposition  Home or Self Care  Patient is doing quite well as far as blood pressure and blood glucose.  24-hour urine is up at 1740 this evening.  It will not change our management so there is no need in for her to wait around to get those  results till late night tonight.  She will go home and keep her appointment for Thursday for biophysical profile and NST and 1 of us will sign off her 24-hour urine when it comes in her inbox tomorrow.    Discharge Medications     Discharge Medications      Changes to Medications      Instructions Start Date   ferrous gluconate 324 MG tablet  Commonly known as: FERGON  What changed: when to take this   324 mg, Oral, Daily With Breakfast      glyburide 5 MG tablet  Commonly known as: DIAbeta  What changed: when to take this   5 mg, Oral, Daily With Dinner      labetalol 100 MG tablet  Commonly known as: NORMODYNE  What changed:   · how much to take  · how to take this   One half of tablet by mouth daily         Continue These Medications      Instructions Start Date   Accu-Chek Ana Plus w/Device kit   CHECK BLOOD SUGAR 4 TIMES DAILY      glucose blood test strip   Check blood sugars 4 times daily      glucose monitor monitoring kit   Check blood sugar 4 times daily      Lancets misc   Check blood sugar 4 times daily      One-A-Day Womens Prenatal 1 28-0.8-235 MG capsule   1 capsule, Oral, Daily      pantoprazole 40 MG EC tablet  Commonly known as: PROTONIX   40 mg, Oral, Daily      sucralfate 1 g tablet  Commonly known as: CARAFATE   1 g, Oral, 4 Times Daily, Dissolve in 10 cc water             Discharge Diet:   Diet Instructions     Diet: Consistent Carbohydrate; Thin      Discharge Diet: Consistent Carbohydrate    Fluid Consistency: Thin          Activity at Discharge:     Follow-up Appointments  Future Appointments   Date Time Provider Department Center   11/19/2020  1:30 PM [facility] None   11/19/2020  2:00 PM María Elena Arthur APRN [facility] None     Additional Instructions for the Follow-ups that You Need to Schedule     Discharge Follow-up with Specialty: [facility]; 3 Days   As directed      Specialty: [facility]    Follow Up: 3 Days    Follow Up Details: for BPP/NST (already scheduled)                Test Results Pending at Discharge       Murphy Wood MD  11/16/20  15:34 EST    Time: 15:37

## 2020-11-16 NOTE — NON STRESS TEST
Es Branch, a  at 34w6d with an DAFNE of 2020, by Ultrasound, was seen at [facility] for a nonstress test.    Chief Complaint   Patient presents with   • Elevated Blood Pressure     BP's at home of 140/90, 159/100- complained of dizziness with these BP's       Patient Active Problem List   Diagnosis   • Pregnancy   • CHTN- labetalol 50 mg QD, baseline 24 hour urine- 80 mg   • Rubella non-immune status, antepartum- MMR pp   • PCOS (polycystic ovarian syndrome)   • Elevated glycosylated hemoglobin- HgBA1c= 5.7, needs early 2 hour GTT at 20  & 28 weeks   • Gestational diabetes mellitus, class A2: glyburide   • Solitary cyst of right breast: 5.7cm. Scheduled for drainage   • Gastroesophageal reflux disease with esophagitis   • Sacroiliac pain during pregnancy       Start Time: 608  Stop Time: 639    Interpretation A  Nonstress Test Interpretation A: Reactive (20 0639 : , Clementina TORRES RN)

## 2020-11-19 ENCOUNTER — ROUTINE PRENATAL (OUTPATIENT)
Dept: OBSTETRICS AND GYNECOLOGY | Facility: CLINIC | Age: 30
End: 2020-11-19

## 2020-11-19 ENCOUNTER — HOSPITAL ENCOUNTER (OUTPATIENT)
Facility: HOSPITAL | Age: 30
Discharge: HOME | End: 2020-11-19
Attending: OBSTETRICS & GYNECOLOGY | Admitting: OBSTETRICS & GYNECOLOGY

## 2020-11-19 VITALS
DIASTOLIC BLOOD PRESSURE: 79 MMHG | HEART RATE: 80 BPM | HEIGHT: 69 IN | SYSTOLIC BLOOD PRESSURE: 115 MMHG | WEIGHT: 226 LBS | TEMPERATURE: 98.2 F | BODY MASS INDEX: 33.47 KG/M2 | RESPIRATION RATE: 20 BRPM

## 2020-11-19 DIAGNOSIS — O10.011 PRE-EXISTING ESSENTIAL HYPERTENSION DURING PREGNANCY IN FIRST TRIMESTER: ICD-10-CM

## 2020-11-19 DIAGNOSIS — O24.419 GDM, CLASS A2: ICD-10-CM

## 2020-11-19 DIAGNOSIS — Z28.39 RUBELLA NON-IMMUNE STATUS, ANTEPARTUM: ICD-10-CM

## 2020-11-19 DIAGNOSIS — O09.899 RUBELLA NON-IMMUNE STATUS, ANTEPARTUM: ICD-10-CM

## 2020-11-19 DIAGNOSIS — Z34.93 PRENATAL CARE IN THIRD TRIMESTER: Primary | ICD-10-CM

## 2020-11-19 LAB
AMPHET+METHAMPHET UR QL: NEGATIVE
AMPHETAMINES UR QL: NEGATIVE
BARBITURATES UR QL SCN: NEGATIVE
BENZODIAZ UR QL SCN: NEGATIVE
BILIRUB UR QL STRIP: NEGATIVE
BUPRENORPHINE SERPL-MCNC: NEGATIVE NG/ML
CANNABINOIDS SERPL QL: NEGATIVE
CLARITY UR: CLEAR
COCAINE UR QL: NEGATIVE
COLOR UR: YELLOW
GLUCOSE UR STRIP-MCNC: NEGATIVE MG/DL
GLUCOSE UR STRIP-MCNC: NEGATIVE MG/DL
HGB UR QL STRIP.AUTO: NEGATIVE
KETONES UR QL STRIP: NEGATIVE
LEUKOCYTE ESTERASE UR QL STRIP.AUTO: NEGATIVE
METHADONE UR QL SCN: NEGATIVE
NITRITE UR QL STRIP: NEGATIVE
OPIATES UR QL: NEGATIVE
OXYCODONE UR QL SCN: NEGATIVE
PCP UR QL SCN: NEGATIVE
PH UR STRIP.AUTO: 7 [PH] (ref 4.5–8)
PROPOXYPH UR QL: NEGATIVE
PROT UR QL STRIP: NEGATIVE
PROT UR STRIP-MCNC: NEGATIVE MG/DL
SP GR UR STRIP: 1.01 (ref 1–1.03)
TRICYCLICS UR QL SCN: NEGATIVE
UROBILINOGEN UR QL STRIP: NORMAL

## 2020-11-19 PROCEDURE — 80306 DRUG TEST PRSMV INSTRMNT: CPT | Performed by: OBSTETRICS & GYNECOLOGY

## 2020-11-19 PROCEDURE — 81003 URINALYSIS AUTO W/O SCOPE: CPT | Performed by: OBSTETRICS & GYNECOLOGY

## 2020-11-19 PROCEDURE — 0502F SUBSEQUENT PRENATAL CARE: CPT | Performed by: NURSE PRACTITIONER

## 2020-11-19 PROCEDURE — 59025 FETAL NON-STRESS TEST: CPT

## 2020-11-19 PROCEDURE — 59025 FETAL NON-STRESS TEST: CPT | Performed by: OBSTETRICS & GYNECOLOGY

## 2020-11-19 NOTE — PROGRESS NOTES
OB follow up > 20 weeks    Chief Complaint   Patient presents with   • Routine Prenatal Visit       Es Branch is a 30 y.o.   35w2d being seen today for her obstetrical visit.  She states she is dealing with episodes of dizziness and nausea. She is taking Labatelol 100mg daily, reports Dr. Wood increased her dosage while she was in the hospital.        Review of Systems  Constitutional: neg fatigue  Cardiovascular: neg edema  Gastrointestinal: neg n/v  Genitourinary: Negative for contractions, cramping, vaginal bleeding, or SROM.   Fetal movement: normal    /82   LMP 2020 (Exact Date)     FHT: present BPM   Uterine Size: Size equals dates        Assessment    1) pregnancy at 35w2d- US IMP: BPP . JULIAN normal.     2) GDMA2- Taking Glyburide 5mg BID. Her BS logs shows good control. NST/BPP 10/10     3) CHTN- Baseline 24-hour urine was 80 mg.  Pt taking Labetalol 100 mg daily but reports she feels dizzy and nausea when on that dosage. Rec pt try 50mg BID. Enc adequate hydration. BP good control. Neg proteinuria.      4) RNI-Offer MMR vaccine postpartum      5) Anemia- Hgb 10.6g/dL. Taking iron daily.       5) S/P Flu and tdap shot.     6) Right breast cyst: S/P cyst drainage. No current c/o.      7) Former smoker- Quit!      8) Heartburn- Taking Carafate and Protonix. Followed by GI.      9) S/P Tdap. Patient advised to have the Tdap shot in the later part of pregnancy to help protect against whooping cough.  Also advised that FOB and other adults who come in contact with the infant should also be vaccinated with Tdap.        10) COVID precautions given. She is working for [employer] and she is getting testing weekly.  I saw the patient with a face mask, gloves and eye protection  The patient herself was masked.  Social distancing was observed as appropriate. She is asking to start maternity leave now. Note provided.      11) S>D- Growth 57% 10/26/20 compared to growth 68% on 20.         Plan    Continue prenatal vitamins  Reviewed this stage of pregnancy  Problem list updated   Follow up in 1 weeks for OB tummy, NST/BPP , and rev of BS log     María Elena Arthur, NORMAN  11/19/2020  14:37 EST

## 2020-11-19 NOTE — NON STRESS TEST
Es Branch, a  at 35w2d with an DAFNE of 2020, by Ultrasound, was seen at [facility] for a nonstress test for:  a nonreactive NST in the office.    Chief Complaint   Patient presents with   • Non-stress Test       Patient Active Problem List   Diagnosis   • Pregnancy   • CHTN- labetalol 50 mg QD, baseline 24 hour urine- 80 mg   • Rubella non-immune status, antepartum- MMR pp   • PCOS (polycystic ovarian syndrome)   • Elevated glycosylated hemoglobin- HgBA1c= 5.7, needs early 2 hour GTT at 20  & 28 weeks   • Gestational diabetes mellitus, class A2: glyburide   • Solitary cyst of right breast: 5.7cm. Scheduled for drainage   • Gastroesophageal reflux disease with esophagitis   • Sacroiliac pain during pregnancy       Start Time: 1504  Stop Time: 1600    Interpretation A  Nonstress Test Interpretation A: Reactive (20 1607 : Amarilis Sebastian RN)     DX:  Abnormal fetal heart rate tracing affecting management of mother    Levon Marcos MD  12:02 EST  20

## 2020-11-19 NOTE — NURSING NOTE
Review d/c instructions with pt. Pt instructed to keep scheduled f/u appt with [facility] office. Pt verbalized understanding of d/c instructions.

## 2020-11-24 VITALS
SYSTOLIC BLOOD PRESSURE: 130 MMHG | DIASTOLIC BLOOD PRESSURE: 82 MMHG | WEIGHT: 225.97 LBS | BODY MASS INDEX: 33.37 KG/M2

## 2020-11-25 ENCOUNTER — HOSPITAL ENCOUNTER (OUTPATIENT)
Facility: HOSPITAL | Age: 30
Discharge: HOME | End: 2020-11-25
Attending: OBSTETRICS & GYNECOLOGY | Admitting: OBSTETRICS & GYNECOLOGY

## 2020-11-25 ENCOUNTER — ROUTINE PRENATAL (OUTPATIENT)
Dept: OBSTETRICS AND GYNECOLOGY | Facility: CLINIC | Age: 30
End: 2020-11-25

## 2020-11-25 VITALS
DIASTOLIC BLOOD PRESSURE: 89 MMHG | WEIGHT: 228 LBS | TEMPERATURE: 97.9 F | HEIGHT: 69 IN | BODY MASS INDEX: 33.77 KG/M2 | SYSTOLIC BLOOD PRESSURE: 127 MMHG | HEART RATE: 79 BPM | RESPIRATION RATE: 20 BRPM

## 2020-11-25 VITALS — BODY MASS INDEX: 33.67 KG/M2 | WEIGHT: 228 LBS | SYSTOLIC BLOOD PRESSURE: 126 MMHG | DIASTOLIC BLOOD PRESSURE: 78 MMHG

## 2020-11-25 DIAGNOSIS — Z36.85 ENCOUNTER FOR ANTENATAL SCREENING FOR STREPTOCOCCUS B: ICD-10-CM

## 2020-11-25 DIAGNOSIS — O10.011 PRE-EXISTING ESSENTIAL HYPERTENSION DURING PREGNANCY IN FIRST TRIMESTER: ICD-10-CM

## 2020-11-25 DIAGNOSIS — O24.419 GESTATIONAL DIABETES MELLITUS, CLASS A2: ICD-10-CM

## 2020-11-25 DIAGNOSIS — Z34.93 PRENATAL CARE IN THIRD TRIMESTER: Primary | ICD-10-CM

## 2020-11-25 LAB
AMPHET+METHAMPHET UR QL: NEGATIVE
AMPHETAMINES UR QL: NEGATIVE
BARBITURATES UR QL SCN: NEGATIVE
BENZODIAZ UR QL SCN: NEGATIVE
BILIRUB UR QL STRIP: NEGATIVE
BUPRENORPHINE SERPL-MCNC: NEGATIVE NG/ML
CANNABINOIDS SERPL QL: NEGATIVE
CLARITY UR: CLEAR
COCAINE UR QL: NEGATIVE
COLOR UR: YELLOW
GLUCOSE UR STRIP-MCNC: ABNORMAL MG/DL
GLUCOSE UR STRIP-MCNC: NEGATIVE MG/DL
GLUCOSE UR STRIP-MCNC: NEGATIVE MG/DL
HGB UR QL STRIP.AUTO: NEGATIVE
KETONES UR QL STRIP: NEGATIVE
LEUKOCYTE ESTERASE UR QL STRIP.AUTO: NEGATIVE
METHADONE UR QL SCN: NEGATIVE
NITRITE UR QL STRIP: NEGATIVE
OPIATES UR QL: NEGATIVE
OXYCODONE UR QL SCN: NEGATIVE
PCP UR QL SCN: NEGATIVE
PH UR STRIP.AUTO: 7 [PH] (ref 4.5–8)
PROPOXYPH UR QL: NEGATIVE
PROT UR QL STRIP: NEGATIVE
PROT UR STRIP-MCNC: NEGATIVE MG/DL
PROT UR STRIP-MCNC: NEGATIVE MG/DL
SP GR UR STRIP: 1.02 (ref 1–1.03)
TRICYCLICS UR QL SCN: NEGATIVE
UROBILINOGEN UR QL STRIP: NORMAL

## 2020-11-25 PROCEDURE — 0502F SUBSEQUENT PRENATAL CARE: CPT | Performed by: NURSE PRACTITIONER

## 2020-11-25 PROCEDURE — 59025 FETAL NON-STRESS TEST: CPT | Performed by: OBSTETRICS & GYNECOLOGY

## 2020-11-25 PROCEDURE — 80306 DRUG TEST PRSMV INSTRMNT: CPT | Performed by: OBSTETRICS & GYNECOLOGY

## 2020-11-25 PROCEDURE — G0463 HOSPITAL OUTPT CLINIC VISIT: HCPCS

## 2020-11-25 PROCEDURE — 59025 FETAL NON-STRESS TEST: CPT

## 2020-11-25 PROCEDURE — 81003 URINALYSIS AUTO W/O SCOPE: CPT | Performed by: OBSTETRICS & GYNECOLOGY

## 2020-11-25 NOTE — PROGRESS NOTES
OB follow up > 20 weeks    Chief Complaint   Patient presents with   • Routine Prenatal Visit   • Non-stress Test       Es Branch is a 30 y.o.   36w1d being seen today for her obstetrical visit.  She forgot to bring her BS log today. She is taking Labetalol 50mg BID.         Review of Systems  Constitutional: neg fatigue  Cardiovascular: neg edema  Gastrointestinal: neg n/v  Genitourinary: Negative for contractions, cramping, vaginal bleeding, or SROM.   Fetal movement: normal    /78   Wt 103 kg (228 lb)   LMP 2020 (Exact Date)   BMI 33.67 kg/m²     FHT: present BPM   Uterine Size: Size equals dates        Assessment    1) pregnancy at 35w2d- US IMP: BPP . JULIAN normal. NST reactive but with decreased variability noted. [facility].     2) GDMA2- Taking Glyburide 5mg BID. Forgot her BS log today. Reports her 2hr after meal readings are <110s. Reports her fasting readings are usually in the 80s.      3) CHTN- Baseline 24-hour urine was 80 mg. Taking Labetalol 50mg BID.  BP good control. Neg proteinuria.      4) RNI-Offer MMR vaccine postpartum      5) Anemia- Hgb 10.6g/dL on 11/15/20. Taking iron daily.       5) S/P Flu and tdap shot.     6) Right breast cyst: S/P cyst drainage. No current c/o.      7) Former smoker- Quit!      8) Heartburn- Taking Carafate and Protonix. Followed by GI.      9) S/P Tdap. Patient advised to have the Tdap shot in the later part of pregnancy to help protect against whooping cough.  Also advised that FOB and other adults who come in contact with the infant should also be vaccinated with Tdap.        10) COVID precautions given. She is working for [employer] and she is getting testing weekly.  I saw the patient with a face mask, gloves and eye protection  The patient herself was masked.  Social distancing was observed as appropriate. She is asking to start maternity leave now. Note provided.      11) S>D- Growth 75% last visit  compared to Growth 57% 10/26/20.    Plan    Continue prenatal vitamins  Reviewed this stage of pregnancy  Problem list updated   Follow up in 1 weeks for OB tummy, NST/BPP , and rev of BS log     María Elena Arthur, APRN  11/25/2020  10:03 EST

## 2020-11-27 LAB — GP B STREP DNA SPEC QL NAA+PROBE: NEGATIVE

## 2020-11-30 ENCOUNTER — HOSPITAL ENCOUNTER (OUTPATIENT)
Facility: HOSPITAL | Age: 30
Discharge: HOME | End: 2020-11-30
Attending: OBSTETRICS & GYNECOLOGY | Admitting: OBSTETRICS & GYNECOLOGY

## 2020-11-30 VITALS
WEIGHT: 228 LBS | RESPIRATION RATE: 18 BRPM | HEART RATE: 77 BPM | TEMPERATURE: 98.1 F | DIASTOLIC BLOOD PRESSURE: 82 MMHG | HEIGHT: 69 IN | SYSTOLIC BLOOD PRESSURE: 132 MMHG | BODY MASS INDEX: 33.77 KG/M2

## 2020-11-30 LAB
AMPHET+METHAMPHET UR QL: NEGATIVE
AMPHETAMINES UR QL: NEGATIVE
BARBITURATES UR QL SCN: NEGATIVE
BENZODIAZ UR QL SCN: NEGATIVE
BUPRENORPHINE SERPL-MCNC: NEGATIVE NG/ML
CANNABINOIDS SERPL QL: NEGATIVE
COCAINE UR QL: NEGATIVE
METHADONE UR QL SCN: NEGATIVE
OPIATES UR QL: NEGATIVE
OXYCODONE UR QL SCN: NEGATIVE
PCP UR QL SCN: NEGATIVE
PROPOXYPH UR QL: NEGATIVE
TRICYCLICS UR QL SCN: NEGATIVE

## 2020-11-30 PROCEDURE — G0463 HOSPITAL OUTPT CLINIC VISIT: HCPCS

## 2020-11-30 PROCEDURE — 80306 DRUG TEST PRSMV INSTRMNT: CPT | Performed by: OBSTETRICS & GYNECOLOGY

## 2020-11-30 PROCEDURE — 59025 FETAL NON-STRESS TEST: CPT

## 2020-12-02 ENCOUNTER — TRANSCRIBE ORDERS (OUTPATIENT)
Dept: ADMINISTRATIVE | Facility: HOSPITAL | Age: 30
End: 2020-12-02

## 2020-12-02 DIAGNOSIS — Z01.818 PREOP EXAMINATION: Primary | ICD-10-CM

## 2020-12-03 ENCOUNTER — ROUTINE PRENATAL (OUTPATIENT)
Dept: OBSTETRICS AND GYNECOLOGY | Facility: CLINIC | Age: 30
End: 2020-12-03

## 2020-12-03 VITALS — WEIGHT: 230.4 LBS | SYSTOLIC BLOOD PRESSURE: 124 MMHG | DIASTOLIC BLOOD PRESSURE: 78 MMHG | BODY MASS INDEX: 34.02 KG/M2

## 2020-12-03 DIAGNOSIS — Z34.93 PRENATAL CARE IN THIRD TRIMESTER: Primary | ICD-10-CM

## 2020-12-03 DIAGNOSIS — O24.419 GESTATIONAL DIABETES MELLITUS, CLASS A2: ICD-10-CM

## 2020-12-03 DIAGNOSIS — O10.011 PRE-EXISTING ESSENTIAL HYPERTENSION DURING PREGNANCY IN FIRST TRIMESTER: ICD-10-CM

## 2020-12-03 LAB
GLUCOSE UR STRIP-MCNC: NEGATIVE MG/DL
PROT UR STRIP-MCNC: NEGATIVE MG/DL

## 2020-12-03 PROCEDURE — 0502F SUBSEQUENT PRENATAL CARE: CPT | Performed by: NURSE PRACTITIONER

## 2020-12-03 NOTE — PROGRESS NOTES
OB follow up > 20 weeks    Chief Complaint   Patient presents with   • Routine Prenatal Visit   • Non-stress Test       Es Branch is a 30 y.o.   37w2d being seen today for her obstetrical visit.  She forgot to bring her BS log today in to the office but will bring it back in after her appt . States her BS are under good control with Glyburide. She continues to feel well while taking Labetalol 50mg BID and reports this change in her medication has been helpful.         Review of Systems  Constitutional: neg fatigue  Cardiovascular: neg edema  Gastrointestinal: neg n/v  Genitourinary: Negative for contractions, cramping, vaginal bleeding, or SROM.   Fetal movement: normal    /78   Wt 105 kg (230 lb 6.4 oz)   LMP 2020 (Exact Date)   BMI 34.02 kg/m²     FHT: present BPM   Uterine Size: Size equals dates        Assessment    1) pregnancy at 37w2d- US IMP: BPP 8/8. JULIAN 11cm. NST reactive. Gross fetal movement noted while on monitor.     2) GDMA2- Taking Glyburide 5mg BID. Forgot her BS log today. Reports her 2hr after meal readings are <110s. Reports her fasting readings are usually in the 80s.      3) CHTN- Baseline 24-hour urine was 80 mg. Taking Labetalol 50mg BID.  BP good control. Neg proteinuria.      4) RNI-Offer MMR vaccine postpartum      5) Anemia- Hgb 10.6g/dL on 11/15/20. Taking iron daily.       5) S/P Flu and tdap shot.     6) Right breast cyst: S/P cyst drainage. No current c/o.      7) Former smoker- Quit!      8) Heartburn- Taking Carafate and Protonix. Followed by GI.      9) S/P Tdap. Patient advised to have the Tdap shot in the later part of pregnancy to help protect against whooping cough.  Also advised that FOB and other adults who come in contact with the infant should also be vaccinated with Tdap.        10) COVID precautions given. She is not working currently, she is on maternity leave.  I saw the patient with a face mask, gloves and eye protection  The patient herself  was masked.  Social distancing was observed as appropriate. She is asking to start maternity leave now. Note provided.      11) S>D- Growth 79% last visit.     Plan    Continue prenatal vitamins  Reviewed this stage of pregnancy  Problem list updated   Follow up in 1 weeks for OB tummy, NST/BPP , and rev of BS log     María Elena Arthur, NORMAN  12/3/2020  13:54 EST

## 2020-12-10 ENCOUNTER — ROUTINE PRENATAL (OUTPATIENT)
Dept: OBSTETRICS AND GYNECOLOGY | Facility: CLINIC | Age: 30
End: 2020-12-10

## 2020-12-10 VITALS — BODY MASS INDEX: 33.97 KG/M2 | SYSTOLIC BLOOD PRESSURE: 122 MMHG | WEIGHT: 230 LBS | DIASTOLIC BLOOD PRESSURE: 76 MMHG

## 2020-12-10 DIAGNOSIS — Z3A.38 38 WEEKS GESTATION OF PREGNANCY: Primary | ICD-10-CM

## 2020-12-10 DIAGNOSIS — O24.419 GESTATIONAL DIABETES MELLITUS, CLASS A2: ICD-10-CM

## 2020-12-10 DIAGNOSIS — O10.011 PRE-EXISTING ESSENTIAL HYPERTENSION DURING PREGNANCY IN FIRST TRIMESTER: ICD-10-CM

## 2020-12-10 LAB
GLUCOSE UR STRIP-MCNC: NEGATIVE MG/DL
PROT UR STRIP-MCNC: NEGATIVE MG/DL

## 2020-12-10 PROCEDURE — 99214 OFFICE O/P EST MOD 30 MIN: CPT | Performed by: OBSTETRICS & GYNECOLOGY

## 2020-12-10 PROCEDURE — 59025 FETAL NON-STRESS TEST: CPT | Performed by: OBSTETRICS & GYNECOLOGY

## 2020-12-10 NOTE — PROGRESS NOTES
OB follow up     Chief Complaint: [facility] FU    Es Branch is a 30 y.o.  38w2d being seen today for her obstetrical visit.  Patient reports no complaints. Fetal movement: normal. Pt for NST/BPP today due to A2GDM. Planned for IOL at 39 weeks. Pt presents with her BS today and they are mostly all normal. FBS normal.    Review of Systems  No bleeding, No cramping/contractions     /76   Wt 104 kg (230 lb)   LMP 2020 (Exact Date)   BMI 33.97 kg/m²     FHT:   present   Uterine Size:   40 cm           Assessment/Plan: Low risk pregnancy    1) pregnancy at 38w2d: GBBS negative     2) GDMA2- Taking Glyburide 5mg BID. BS normal. BPP/NST 10/10 today. JULIAN 15cm. IOL planned for 39 weeks. Cervix unfavorable. Pt aware that the induction may take several days.     3) CHTN- Baseline 24-hour urine was 80 mg. Taking Labetalol 50mg BID.  BP good control. Neg proteinuria.      4) RNI-Offer MMR vaccine postpartum      5) Anemia- Hgb 10.6g/dL on 11/15/20. Taking iron daily.       5) S/P Flu and tdap shot.     6) Right breast cyst: S/P cyst drainage 2020. No current c/o.      7) Former smoker- Quit!      8) Heartburn- Taking Carafate and Protonix. Followed by GI.       9) 11) S>D- Growth 79% last visit.    10) COVID precautions given. She is not working currently, she is on maternity leave.  I saw the patient with a face mask, gloves and eye protection  The patient herself was masked.  Social distancing was observed as appropriate. She is asking to start maternity leave now. Note provided.       11) S>D- Growth 79% last visit.        Reviewed this stage of pregnancy  Problem list updated   No follow-ups on file.      Prachi Kim DO    12/10/2020  11:23 EST

## 2020-12-12 ENCOUNTER — LAB (OUTPATIENT)
Dept: LAB | Facility: HOSPITAL | Age: 30
End: 2020-12-12

## 2020-12-12 DIAGNOSIS — Z01.818 PREOP EXAMINATION: ICD-10-CM

## 2020-12-12 PROCEDURE — U0004 COV-19 TEST NON-CDC HGH THRU: HCPCS | Performed by: OBSTETRICS & GYNECOLOGY

## 2020-12-12 PROCEDURE — C9803 HOPD COVID-19 SPEC COLLECT: HCPCS

## 2020-12-13 LAB — SARS-COV-2 RNA RESP QL NAA+PROBE: NOT DETECTED

## 2020-12-15 ENCOUNTER — HOSPITAL ENCOUNTER (INPATIENT)
Facility: HOSPITAL | Age: 30
LOS: 2 days | Discharge: HOME | End: 2020-12-18
Attending: OBSTETRICS & GYNECOLOGY | Admitting: OBSTETRICS & GYNECOLOGY

## 2020-12-15 ENCOUNTER — HOSPITAL ENCOUNTER (OUTPATIENT)
Dept: LABOR AND DELIVERY | Facility: HOSPITAL | Age: 30
Discharge: HOME | End: 2020-12-15

## 2020-12-15 DIAGNOSIS — Z98.891 S/P CESAREAN SECTION: Primary | ICD-10-CM

## 2020-12-15 LAB
ABO GROUP BLD: NORMAL
AMPHET+METHAMPHET UR QL: NEGATIVE
AMPHETAMINES UR QL: NEGATIVE
BARBITURATES UR QL SCN: NEGATIVE
BENZODIAZ UR QL SCN: NEGATIVE
BLD GP AB SCN SERPL QL: NEGATIVE
BUPRENORPHINE SERPL-MCNC: NEGATIVE NG/ML
CANNABINOIDS SERPL QL: NEGATIVE
COCAINE UR QL: NEGATIVE
DEPRECATED RDW RBC AUTO: 41.6 FL (ref 37–54)
ERYTHROCYTE [DISTWIDTH] IN BLOOD BY AUTOMATED COUNT: 13.2 % (ref 12.3–15.4)
GLUCOSE BLDC GLUCOMTR-MCNC: 120 MG/DL (ref 70–130)
GLUCOSE BLDC GLUCOMTR-MCNC: 146 MG/DL (ref 70–130)
GLUCOSE BLDC GLUCOMTR-MCNC: 73 MG/DL (ref 70–130)
GLUCOSE BLDC GLUCOMTR-MCNC: 77 MG/DL (ref 70–130)
HCT VFR BLD AUTO: 30.2 % (ref 34–46.6)
HGB BLD-MCNC: 9.7 G/DL (ref 12–15.9)
MCH RBC QN AUTO: 27.6 PG (ref 26.6–33)
MCHC RBC AUTO-ENTMCNC: 32.1 G/DL (ref 31.5–35.7)
MCV RBC AUTO: 86 FL (ref 79–97)
METHADONE UR QL SCN: NEGATIVE
OPIATES UR QL: NEGATIVE
OXYCODONE UR QL SCN: NEGATIVE
PCP UR QL SCN: NEGATIVE
PLATELET # BLD AUTO: 290 10*3/MM3 (ref 140–450)
PMV BLD AUTO: 10.6 FL (ref 6–12)
PROPOXYPH UR QL: NEGATIVE
RBC # BLD AUTO: 3.51 10*6/MM3 (ref 3.77–5.28)
RH BLD: POSITIVE
T&S EXPIRATION DATE: NORMAL
TRICYCLICS UR QL SCN: NEGATIVE
WBC # BLD AUTO: 10.9 10*3/MM3 (ref 3.4–10.8)

## 2020-12-15 PROCEDURE — 86900 BLOOD TYPING SEROLOGIC ABO: CPT | Performed by: OBSTETRICS & GYNECOLOGY

## 2020-12-15 PROCEDURE — 85027 COMPLETE CBC AUTOMATED: CPT | Performed by: OBSTETRICS & GYNECOLOGY

## 2020-12-15 PROCEDURE — 80306 DRUG TEST PRSMV INSTRMNT: CPT | Performed by: OBSTETRICS & GYNECOLOGY

## 2020-12-15 PROCEDURE — 82962 GLUCOSE BLOOD TEST: CPT

## 2020-12-15 PROCEDURE — 86901 BLOOD TYPING SEROLOGIC RH(D): CPT | Performed by: OBSTETRICS & GYNECOLOGY

## 2020-12-15 PROCEDURE — 3E033VJ INTRODUCTION OF OTHER HORMONE INTO PERIPHERAL VEIN, PERCUTANEOUS APPROACH: ICD-10-PCS | Performed by: OBSTETRICS & GYNECOLOGY

## 2020-12-15 PROCEDURE — 3E0P7VZ INTRODUCTION OF HORMONE INTO FEMALE REPRODUCTIVE, VIA NATURAL OR ARTIFICIAL OPENING: ICD-10-PCS | Performed by: OBSTETRICS & GYNECOLOGY

## 2020-12-15 PROCEDURE — 86850 RBC ANTIBODY SCREEN: CPT | Performed by: OBSTETRICS & GYNECOLOGY

## 2020-12-15 RX ORDER — OXYTOCIN/0.9 % SODIUM CHLORIDE 30/500 ML
2-20 PLASTIC BAG, INJECTION (ML) INTRAVENOUS
Status: DISCONTINUED | OUTPATIENT
Start: 2020-12-16 | End: 2020-12-16

## 2020-12-15 RX ORDER — ZOLPIDEM TARTRATE 5 MG/1
5 TABLET ORAL ONCE
Status: COMPLETED | OUTPATIENT
Start: 2020-12-15 | End: 2020-12-15

## 2020-12-15 RX ORDER — LABETALOL 100 MG/1
50 TABLET, FILM COATED ORAL EVERY 12 HOURS SCHEDULED
Status: DISCONTINUED | OUTPATIENT
Start: 2020-12-15 | End: 2020-12-18 | Stop reason: HOSPADM

## 2020-12-15 RX ORDER — SODIUM CHLORIDE, SODIUM LACTATE, POTASSIUM CHLORIDE, CALCIUM CHLORIDE 600; 310; 30; 20 MG/100ML; MG/100ML; MG/100ML; MG/100ML
125 INJECTION, SOLUTION INTRAVENOUS CONTINUOUS
Status: DISCONTINUED | OUTPATIENT
Start: 2020-12-15 | End: 2020-12-18 | Stop reason: HOSPADM

## 2020-12-15 RX ADMIN — ZOLPIDEM TARTRATE 5 MG: 5 TABLET ORAL at 22:02

## 2020-12-15 RX ADMIN — LABETALOL HYDROCHLORIDE 50 MG: 100 TABLET, FILM COATED ORAL at 20:37

## 2020-12-15 RX ADMIN — DINOPROSTONE 10 MG: 10 INSERT VAGINAL at 08:05

## 2020-12-15 NOTE — H&P
Ms. Branch is a antonia 29 yo  with an IUP @ 39.1 weeks admitted for IOL for CHTN and GDMA2. She is on labetalol 50 mg BID and Glyburide 5 mg BID. Her Bps have ranged form 100s-120s/50-80s and her FBS was 73 this morning. Her Glyburide was held last night and this morning as she was NPO. Her prenatal care has also been complicated by h/o PCOS, RNI, GERD, and anemia with an admission HgB of 9.7. She is GBS negative and her cervix is fingertip dilated, long and - 3 station and the fetal head is palpable.. She has received Cervdil and we discussed that her IOL may take some time as her cervix is not favorable. FHTs are in the 130-140s with accels noted and no decels appreciated.  Ms. Branch is comfortable and feels only mild cramping.   Imelda Rayo MD

## 2020-12-16 ENCOUNTER — ANESTHESIA EVENT (OUTPATIENT)
Dept: OBSTETRICS AND GYNECOLOGY | Facility: HOSPITAL | Age: 30
End: 2020-12-16

## 2020-12-16 ENCOUNTER — ANESTHESIA (OUTPATIENT)
Dept: OBSTETRICS AND GYNECOLOGY | Facility: HOSPITAL | Age: 30
End: 2020-12-16

## 2020-12-16 PROBLEM — Z98.891 S/P CESAREAN SECTION: Status: ACTIVE | Noted: 2020-12-16

## 2020-12-16 LAB
GLUCOSE BLDC GLUCOMTR-MCNC: 86 MG/DL (ref 70–130)
GLUCOSE BLDC GLUCOMTR-MCNC: 97 MG/DL (ref 70–130)

## 2020-12-16 PROCEDURE — 10907ZC DRAINAGE OF AMNIOTIC FLUID, THERAPEUTIC FROM PRODUCTS OF CONCEPTION, VIA NATURAL OR ARTIFICIAL OPENING: ICD-10-PCS | Performed by: OBSTETRICS & GYNECOLOGY

## 2020-12-16 PROCEDURE — 59514 CESAREAN DELIVERY ONLY: CPT | Performed by: OBSTETRICS & GYNECOLOGY

## 2020-12-16 PROCEDURE — 25010000003 MORPHINE PER 10 MG: Performed by: NURSE ANESTHETIST, CERTIFIED REGISTERED

## 2020-12-16 PROCEDURE — S0260 H&P FOR SURGERY: HCPCS | Performed by: OBSTETRICS & GYNECOLOGY

## 2020-12-16 PROCEDURE — 63710000001 DIPHENHYDRAMINE PER 50 MG: Performed by: NURSE ANESTHETIST, CERTIFIED REGISTERED

## 2020-12-16 PROCEDURE — 25010000003 CEFAZOLIN SODIUM-DEXTROSE 2-3 GM-%(50ML) RECONSTITUTED SOLUTION: Performed by: OBSTETRICS & GYNECOLOGY

## 2020-12-16 PROCEDURE — 94799 UNLISTED PULMONARY SVC/PX: CPT

## 2020-12-16 PROCEDURE — 25010000002 KETOROLAC TROMETHAMINE PER 15 MG: Performed by: NURSE ANESTHETIST, CERTIFIED REGISTERED

## 2020-12-16 PROCEDURE — 82962 GLUCOSE BLOOD TEST: CPT

## 2020-12-16 PROCEDURE — 25010000002 ONDANSETRON PER 1 MG: Performed by: NURSE ANESTHETIST, CERTIFIED REGISTERED

## 2020-12-16 PROCEDURE — 59514 CESAREAN DELIVERY ONLY: CPT | Performed by: SPECIALIST/TECHNOLOGIST, OTHER

## 2020-12-16 RX ORDER — PROMETHAZINE HYDROCHLORIDE 25 MG/1
25 TABLET ORAL EVERY 6 HOURS PRN
Status: DISCONTINUED | OUTPATIENT
Start: 2020-12-16 | End: 2020-12-18 | Stop reason: HOSPADM

## 2020-12-16 RX ORDER — BUPIVACAINE HYDROCHLORIDE 7.5 MG/ML
INJECTION, SOLUTION INTRASPINAL
Status: DISPENSED
Start: 2020-12-16 | End: 2020-12-16

## 2020-12-16 RX ORDER — METHYLERGONOVINE MALEATE 0.2 MG/ML
200 INJECTION INTRAVENOUS ONCE AS NEEDED
Status: DISCONTINUED | OUTPATIENT
Start: 2020-12-16 | End: 2020-12-18 | Stop reason: HOSPADM

## 2020-12-16 RX ORDER — CARBOPROST TROMETHAMINE 250 UG/ML
250 INJECTION, SOLUTION INTRAMUSCULAR AS NEEDED
Status: DISCONTINUED | OUTPATIENT
Start: 2020-12-16 | End: 2020-12-18 | Stop reason: HOSPADM

## 2020-12-16 RX ORDER — HYDROCODONE BITARTRATE AND ACETAMINOPHEN 5; 325 MG/1; MG/1
1 TABLET ORAL EVERY 4 HOURS PRN
Status: DISPENSED | OUTPATIENT
Start: 2020-12-16 | End: 2020-12-17

## 2020-12-16 RX ORDER — MORPHINE SULFATE 2 MG/ML
2 INJECTION, SOLUTION INTRAMUSCULAR; INTRAVENOUS
Status: DISCONTINUED | OUTPATIENT
Start: 2020-12-16 | End: 2020-12-18 | Stop reason: HOSPADM

## 2020-12-16 RX ORDER — OXYTOCIN/0.9 % SODIUM CHLORIDE 30/500 ML
650 PLASTIC BAG, INJECTION (ML) INTRAVENOUS ONCE
Status: DISCONTINUED | OUTPATIENT
Start: 2020-12-16 | End: 2020-12-18 | Stop reason: HOSPADM

## 2020-12-16 RX ORDER — PRENATAL VIT/IRON FUM/FOLIC AC 27MG-0.8MG
1 TABLET ORAL DAILY
Status: DISCONTINUED | OUTPATIENT
Start: 2020-12-16 | End: 2020-12-18 | Stop reason: HOSPADM

## 2020-12-16 RX ORDER — OXYCODONE HYDROCHLORIDE AND ACETAMINOPHEN 5; 325 MG/1; MG/1
2 TABLET ORAL EVERY 4 HOURS PRN
Status: DISCONTINUED | OUTPATIENT
Start: 2020-12-17 | End: 2020-12-18 | Stop reason: HOSPADM

## 2020-12-16 RX ORDER — DIPHENHYDRAMINE HYDROCHLORIDE 50 MG/ML
25 INJECTION INTRAMUSCULAR; INTRAVENOUS EVERY 4 HOURS PRN
Status: DISCONTINUED | OUTPATIENT
Start: 2020-12-16 | End: 2020-12-18 | Stop reason: HOSPADM

## 2020-12-16 RX ORDER — DOCUSATE SODIUM 100 MG/1
100 CAPSULE, LIQUID FILLED ORAL 2 TIMES DAILY PRN
Status: DISCONTINUED | OUTPATIENT
Start: 2020-12-16 | End: 2020-12-18 | Stop reason: HOSPADM

## 2020-12-16 RX ORDER — ONDANSETRON 4 MG/1
4 TABLET, FILM COATED ORAL EVERY 6 HOURS PRN
Status: DISCONTINUED | OUTPATIENT
Start: 2020-12-16 | End: 2020-12-18 | Stop reason: HOSPADM

## 2020-12-16 RX ORDER — PROMETHAZINE HYDROCHLORIDE 25 MG/1
12.5 SUPPOSITORY RECTAL EVERY 6 HOURS PRN
Status: DISCONTINUED | OUTPATIENT
Start: 2020-12-16 | End: 2020-12-18 | Stop reason: HOSPADM

## 2020-12-16 RX ORDER — CEFAZOLIN SODIUM 2 G/50ML
2 SOLUTION INTRAVENOUS EVERY 8 HOURS
Status: ACTIVE | OUTPATIENT
Start: 2020-12-16 | End: 2020-12-17

## 2020-12-16 RX ORDER — OXYCODONE HYDROCHLORIDE AND ACETAMINOPHEN 5; 325 MG/1; MG/1
1 TABLET ORAL EVERY 4 HOURS PRN
Status: DISCONTINUED | OUTPATIENT
Start: 2020-12-17 | End: 2020-12-18 | Stop reason: HOSPADM

## 2020-12-16 RX ORDER — DIPHENHYDRAMINE HCL 25 MG
25 CAPSULE ORAL EVERY 4 HOURS PRN
Status: DISCONTINUED | OUTPATIENT
Start: 2020-12-16 | End: 2020-12-18 | Stop reason: HOSPADM

## 2020-12-16 RX ORDER — ONDANSETRON 2 MG/ML
INJECTION INTRAMUSCULAR; INTRAVENOUS AS NEEDED
Status: DISCONTINUED | OUTPATIENT
Start: 2020-12-16 | End: 2020-12-16 | Stop reason: SURG

## 2020-12-16 RX ORDER — CEFAZOLIN SODIUM 2 G/50ML
SOLUTION INTRAVENOUS
Status: COMPLETED
Start: 2020-12-16 | End: 2020-12-16

## 2020-12-16 RX ORDER — IBUPROFEN 800 MG/1
800 TABLET ORAL EVERY 8 HOURS PRN
Status: DISCONTINUED | OUTPATIENT
Start: 2020-12-17 | End: 2020-12-18 | Stop reason: HOSPADM

## 2020-12-16 RX ORDER — MISOPROSTOL 200 UG/1
800 TABLET ORAL AS NEEDED
Status: DISCONTINUED | OUTPATIENT
Start: 2020-12-16 | End: 2020-12-18 | Stop reason: HOSPADM

## 2020-12-16 RX ORDER — ONDANSETRON 2 MG/ML
INJECTION INTRAMUSCULAR; INTRAVENOUS
Status: COMPLETED
Start: 2020-12-16 | End: 2020-12-16

## 2020-12-16 RX ORDER — FERROUS GLUCONATE 324(37.5)
324 TABLET ORAL
Status: DISCONTINUED | OUTPATIENT
Start: 2020-12-16 | End: 2020-12-18 | Stop reason: HOSPADM

## 2020-12-16 RX ORDER — SCOLOPAMINE TRANSDERMAL SYSTEM 1 MG/1
PATCH, EXTENDED RELEASE TRANSDERMAL AS NEEDED
Status: DISCONTINUED | OUTPATIENT
Start: 2020-12-16 | End: 2020-12-16 | Stop reason: SURG

## 2020-12-16 RX ORDER — FAMOTIDINE 10 MG/ML
INJECTION, SOLUTION INTRAVENOUS AS NEEDED
Status: DISCONTINUED | OUTPATIENT
Start: 2020-12-16 | End: 2020-12-16 | Stop reason: SURG

## 2020-12-16 RX ORDER — MORPHINE SULFATE 0.5 MG/ML
INJECTION, SOLUTION EPIDURAL; INTRATHECAL; INTRAVENOUS AS NEEDED
Status: DISCONTINUED | OUTPATIENT
Start: 2020-12-16 | End: 2020-12-16 | Stop reason: SURG

## 2020-12-16 RX ORDER — LANOLIN 100 %
OINTMENT (GRAM) TOPICAL
Status: DISCONTINUED | OUTPATIENT
Start: 2020-12-16 | End: 2020-12-18 | Stop reason: HOSPADM

## 2020-12-16 RX ORDER — ONDANSETRON 2 MG/ML
4 INJECTION INTRAMUSCULAR; INTRAVENOUS EVERY 6 HOURS PRN
Status: DISCONTINUED | OUTPATIENT
Start: 2020-12-16 | End: 2020-12-18 | Stop reason: HOSPADM

## 2020-12-16 RX ORDER — PANTOPRAZOLE SODIUM 40 MG/1
40 TABLET, DELAYED RELEASE ORAL DAILY
Status: DISCONTINUED | OUTPATIENT
Start: 2020-12-16 | End: 2020-12-18 | Stop reason: HOSPADM

## 2020-12-16 RX ORDER — OXYTOCIN/0.9 % SODIUM CHLORIDE 30/500 ML
85 PLASTIC BAG, INJECTION (ML) INTRAVENOUS ONCE
Status: COMPLETED | OUTPATIENT
Start: 2020-12-16 | End: 2020-12-16

## 2020-12-16 RX ORDER — LABETALOL 100 MG/1
50 TABLET, FILM COATED ORAL EVERY 12 HOURS SCHEDULED
Status: DISCONTINUED | OUTPATIENT
Start: 2020-12-16 | End: 2020-12-16 | Stop reason: SDUPTHER

## 2020-12-16 RX ORDER — FAMOTIDINE 10 MG/ML
INJECTION, SOLUTION INTRAVENOUS
Status: COMPLETED
Start: 2020-12-16 | End: 2020-12-16

## 2020-12-16 RX ORDER — SODIUM CHLORIDE, SODIUM LACTATE, POTASSIUM CHLORIDE, CALCIUM CHLORIDE 600; 310; 30; 20 MG/100ML; MG/100ML; MG/100ML; MG/100ML
125 INJECTION, SOLUTION INTRAVENOUS CONTINUOUS
Status: DISCONTINUED | OUTPATIENT
Start: 2020-12-16 | End: 2020-12-18 | Stop reason: HOSPADM

## 2020-12-16 RX ORDER — SCOLOPAMINE TRANSDERMAL SYSTEM 1 MG/1
PATCH, EXTENDED RELEASE TRANSDERMAL
Status: COMPLETED
Start: 2020-12-16 | End: 2020-12-16

## 2020-12-16 RX ORDER — ONDANSETRON 2 MG/ML
4 INJECTION INTRAMUSCULAR; INTRAVENOUS ONCE AS NEEDED
Status: DISCONTINUED | OUTPATIENT
Start: 2020-12-16 | End: 2020-12-18 | Stop reason: HOSPADM

## 2020-12-16 RX ORDER — KETOROLAC TROMETHAMINE 30 MG/ML
30 INJECTION, SOLUTION INTRAMUSCULAR; INTRAVENOUS EVERY 6 HOURS PRN
Status: DISPENSED | OUTPATIENT
Start: 2020-12-16 | End: 2020-12-17

## 2020-12-16 RX ORDER — KETOROLAC TROMETHAMINE 30 MG/ML
INJECTION, SOLUTION INTRAMUSCULAR; INTRAVENOUS AS NEEDED
Status: DISCONTINUED | OUTPATIENT
Start: 2020-12-16 | End: 2020-12-16 | Stop reason: SURG

## 2020-12-16 RX ORDER — MORPHINE SULFATE 0.5 MG/ML
INJECTION, SOLUTION EPIDURAL; INTRATHECAL; INTRAVENOUS
Status: COMPLETED
Start: 2020-12-16 | End: 2020-12-16

## 2020-12-16 RX ORDER — SIMETHICONE 80 MG
80 TABLET,CHEWABLE ORAL 4 TIMES DAILY PRN
Status: DISCONTINUED | OUTPATIENT
Start: 2020-12-16 | End: 2020-12-18 | Stop reason: HOSPADM

## 2020-12-16 RX ORDER — BUPIVACAINE HYDROCHLORIDE 7.5 MG/ML
INJECTION, SOLUTION EPIDURAL; RETROBULBAR
Status: COMPLETED | OUTPATIENT
Start: 2020-12-16 | End: 2020-12-16

## 2020-12-16 RX ADMIN — SODIUM CHLORIDE, POTASSIUM CHLORIDE, SODIUM LACTATE AND CALCIUM CHLORIDE: 600; 310; 30; 20 INJECTION, SOLUTION INTRAVENOUS at 09:06

## 2020-12-16 RX ADMIN — DIPHENHYDRAMINE HYDROCHLORIDE 25 MG: 25 CAPSULE ORAL at 14:15

## 2020-12-16 RX ADMIN — EPHEDRINE SULFATE 10 MG: 50 INJECTION, SOLUTION INTRAVENOUS at 10:34

## 2020-12-16 RX ADMIN — EPHEDRINE SULFATE 10 MG: 50 INJECTION, SOLUTION INTRAVENOUS at 10:31

## 2020-12-16 RX ADMIN — EPHEDRINE SULFATE 10 MG: 50 INJECTION, SOLUTION INTRAVENOUS at 10:27

## 2020-12-16 RX ADMIN — SODIUM CHLORIDE, POTASSIUM CHLORIDE, SODIUM LACTATE AND CALCIUM CHLORIDE 1000 ML: 600; 310; 30; 20 INJECTION, SOLUTION INTRAVENOUS at 09:31

## 2020-12-16 RX ADMIN — PRENATAL VIT W/ FE FUMARATE-FA TAB 27-0.8 MG 1 TABLET: 27-0.8 TAB at 14:15

## 2020-12-16 RX ADMIN — KETOROLAC TROMETHAMINE 60 MG: 30 INJECTION INTRAMUSCULAR; INTRAVENOUS at 11:13

## 2020-12-16 RX ADMIN — Medication 2 MILLI-UNITS/MIN: at 02:15

## 2020-12-16 RX ADMIN — FAMOTIDINE 20 MG: 10 INJECTION INTRAVENOUS at 09:56

## 2020-12-16 RX ADMIN — KETOROLAC TROMETHAMINE 30 MG: 30 INJECTION, SOLUTION INTRAMUSCULAR; INTRAVENOUS at 17:24

## 2020-12-16 RX ADMIN — ONDANSETRON 4 MG: 2 INJECTION, SOLUTION INTRAMUSCULAR; INTRAVENOUS at 10:01

## 2020-12-16 RX ADMIN — BUPIVACAINE HYDROCHLORIDE 1.7 ML: 7.5 INJECTION, SOLUTION EPIDURAL; RETROBULBAR at 10:22

## 2020-12-16 RX ADMIN — FERROUS GLUCONATE TAB 324 MG (37.5 MG ELEMENTAL IRON) 324 MG: 324 (37.5 FE) TAB at 14:15

## 2020-12-16 RX ADMIN — CEFAZOLIN SODIUM 2 G: 2 SOLUTION INTRAVENOUS at 10:16

## 2020-12-16 RX ADMIN — MORPHINE SULFATE 0.2 MG: 0.5 INJECTION EPIDURAL; INTRATHECAL; INTRAVENOUS at 10:21

## 2020-12-16 RX ADMIN — OXYTOCIN 85 ML/HR: 10 INJECTION INTRAVENOUS at 12:35

## 2020-12-16 RX ADMIN — SODIUM CHLORIDE, POTASSIUM CHLORIDE, SODIUM LACTATE AND CALCIUM CHLORIDE 125 ML/HR: 600; 310; 30; 20 INJECTION, SOLUTION INTRAVENOUS at 02:16

## 2020-12-16 RX ADMIN — SCOPALAMINE 1 PATCH: 1 PATCH, EXTENDED RELEASE TRANSDERMAL at 09:58

## 2020-12-16 NOTE — ANESTHESIA PREPROCEDURE EVALUATION
Anesthesia Evaluation     Patient summary reviewed and Nursing notes reviewed   no history of anesthetic complications:               Airway   Mallampati: II  TM distance: >3 FB  Neck ROM: full  No difficulty expected  Dental    (+) poor dentition    Pulmonary - normal exam    breath sounds clear to auscultation  (+) a smoker (quit 8 months ago) Former, asthma,    ROS comment: snore  Cardiovascular - normal exam  Exercise tolerance: good (4-7 METS)    Rhythm: regular  Rate: normal    (+) hypertension well controlled less than 2 medications,       Neuro/Psych  (+) headaches,     GI/Hepatic/Renal/Endo    (+) obesity,  GERD poorly controlled,  diabetes mellitus gestational well controlled,     Musculoskeletal (-) negative ROS    Abdominal   (+) obese,    Substance History - negative use     OB/GYN    (+) Pregnant,         Other - negative ROS                       Anesthesia Plan    ASA 3     spinal and ITN       Anesthetic plan, all risks, benefits, and alternatives have been provided, discussed and informed consent has been obtained with: patient.  Use of blood products discussed with patient .

## 2020-12-16 NOTE — ADDENDUM NOTE
Addendum  created 12/16/20 1256 by Shaka Pelayo, MARY    Review and Sign - Ready for Procedure

## 2020-12-16 NOTE — ANESTHESIA POSTPROCEDURE EVALUATION
Patient: Es Branch    Procedure Summary     Date: 20 Room / Location: [facility] LABOR DELIVERY    Anesthesia Start: 101 Anesthesia Stop:     Procedure:  SECTION PRIMARY (N/A Abdomen) Diagnosis:     Surgeon: Prachi Kim DO Provider: Shaka Pelayo CRNA    Anesthesia Type: spinal, ITN ASA Status: 3          Anesthesia Type: spinal, ITN    Vitals  No vitals data found for the desired time range.          Post Anesthesia Care and Evaluation    Patient location during evaluation: bedside  Patient participation: complete - patient participated  Level of consciousness: awake and alert  Pain score: 0  Pain management: adequate  Airway patency: patent  Anesthetic complications: No anesthetic complications  PONV Status: none  Cardiovascular status: acceptable  Respiratory status: acceptable  Hydration status: acceptable

## 2020-12-16 NOTE — PAYOR COMM NOTE
"[facility]  [address] [place] [address]  Facility NPI: [id number]    Janice Moreno  Fax: [phone]  Phone: [phone] (Lizzy: [phone], Linda: 1271)  Email: [email or web address]    Date: 12/16/2020  To: [facility]   Fax: [phone]  Subject: DELIVERY NOTE   Reference #: [id number]    Please don't hesitate to contact me with any questions or concerns.    Es Alicea (30 y.o. Female)     Date of Birth Social Security Number Address Home Phone MRN    1990  [address] [place] [address] [phone] [id number]    Sikhism Marital Status          None        Admission Date Admission Type Admitting Provider Attending Provider Department, Room/Bed    12/15/20 Elective Murphy Wood MD McDanald, Darrick Matthew, MD [facility] OB GYN, 1120/1    Discharge Date Discharge Disposition Discharge Destination                       Attending Provider: Murphy Wood MD    Allergies: No Known Allergies    Isolation: None   Infection: None   Code Status: CPR    Ht: 175.3 cm (69\")   Wt: 104 kg (230 lb)    Admission Cmt: None   Principal Problem: None                Active Insurance as of 12/15/2020     Primary Coverage     Payor Plan Insurance Group Employer/Plan Group    ANTHEM MEDICAID ANTHEM MEDICAID [id number]     Payor Plan Address Payor Plan Phone Number Payor Plan Fax Number Effective Dates    [address] [address] [phone]  11/1/2020 - None Entered    [facility] [id number]       Subscriber Name Subscriber Birth Date Member ID       ES ALICEA 1990 [id number]                 Emergency Contacts      (Rel.) Home Phone Work Phone Mobile Phone    Juan CarlosStuart (Spouse) [phone] -- [phone]               History & Physical      Prachi Kim DO at 12/16/20 1019              Patient Care Team:  Rosetta Adams MD as PCP - General (Family Medicine)    Chief complaint: primary LTCS     "   HPI: 29yo  at 39.2 weeks for IOL due to A2GDM and CHTN. Pt has an unfavorable cervix. She received Cervidil yesterdy and her cervix is unchanged. She was started on Pitocin last night but it had to be stopped due to decreased variability. Pt has had decreased fetal variability throughout the pregnancy and has always needed additional monitoring on L and D due to nonreactive NST in office. SVE fingertip/10%/-4. Discussed with pt and FOB that she is remote from delivery and if fetus is not tolerating pitocin then it will be difficult to get her into labor. Offered to proceed with primary LTCS and they accept.     Debilities: None    Emotional Behavior: Appropriate    PMH:   Past Medical History:   Diagnosis Date   • Anemia    • Asthma    • Female infertility    • Gestational diabetes    • Headache    • Hypertension    • PCOS (polycystic ovarian syndrome) 2013   • Polycystic ovary syndrome          PSH:   Past Surgical History:   Procedure Laterality Date   • ADENOIDECTOMY     • DIAGNOSTIC LAPAROSCOPY N/A 2020    Procedure: DIAGNOSTIC LAPAROSCOPY;  Surgeon: Imelda Rayo MD;  Location: [facility];  Service: Obstetrics/Gynecology;  Laterality: N/A;   • TONSILLECTOMY  2008    adnoids also   • US GUIDED CYST ASPIRATION BREAST N/A 2020       SoHx:   Social History     Socioeconomic History   • Marital status:      Spouse name: Not on file   • Number of children: Not on file   • Years of education: Not on file   • Highest education level: Not on file   Occupational History   • Occupation: cna     Employer: [employer]   Social Needs   • Financial resource strain: Not hard at all   • Food insecurity     Worry: Never true     Inability: Never true   • Transportation needs     Medical: No     Non-medical: No   Tobacco Use   • Smoking status: Former Smoker     Types: Cigarettes     Quit date: 2020     Years since quittin.5   • Smokeless tobacco: Never Used   Substance and  Sexual Activity   • Alcohol use: Not Currently   • Drug use: No   • Sexual activity: Yes     Partners: Male     Birth control/protection: None   Lifestyle   • Physical activity     Days per week: 4 days     Minutes per session: 60 min   • Stress: Not at all       FHx:   Family History   Problem Relation Age of Onset   • Hypertension Mother    • Cancer Mother    • Hypertension Maternal Grandmother    • Diabetes Maternal Grandfather    • Hypertension Paternal Grandmother    • Diabetes Paternal Grandfather    • Deep vein thrombosis Maternal Aunt    • Breast cancer Neg Hx    • Colon cancer Neg Hx    • Pulmonary embolism Neg Hx    • Anesthesia problems Neg Hx          Allergies: Patient has no known allergies.    Medications:   No current facility-administered medications on file prior to encounter.      Current Outpatient Medications on File Prior to Encounter   Medication Sig Dispense Refill   • Blood Glucose Monitoring Suppl (Accu-Chek Ana Plus) w/Device kit CHECK BLOOD SUGAR 4 TIMES DAILY     • ferrous gluconate (FERGON) 324 MG tablet Take 1 tablet by mouth Daily With Breakfast. (Patient taking differently: Take 324 mg by mouth 3 (Three) Times a Day.) 30 tablet 6   • glucose blood test strip Check blood sugars 4 times daily 120 each 6   • glucose monitor monitoring kit Check blood sugar 4 times daily 1 each 1   • glyburide (DIAbeta) 5 MG tablet Take 1 tablet by mouth Daily With Dinner. (Patient taking differently: Take 5 mg by mouth 2 (Two) Times a Day With Meals.) 30 tablet 6   • labetalol (NORMODYNE) 100 MG tablet One half of tablet by mouth daily (Patient taking differently: Take 50 mg by mouth. One half of tablet by mouth daily) 30 tablet 6   • Lancets misc Check blood sugar 4 times daily 120 each 6   • pantoprazole (PROTONIX) 40 MG EC tablet Take 1 tablet by mouth Daily. 30 tablet 6   • Prenat-Fe Carbonyl-FA-Omega 3 (ONE-A-DAY WOMENS PRENATAL 1) 28-0.8-235 MG capsule Take 1 capsule by mouth Daily.     •  sucralfate (CARAFATE) 1 g tablet Take 1 tablet by mouth 4 (Four) Times a Day. Dissolve in 10 cc water 120 tablet 5             Vital Signs  Temp:  [97.9 °F (36.6 °C)-98.4 °F (36.9 °C)] 97.9 °F (36.6 °C)  Heart Rate:  [] 76  Resp:  [18-20] 18  BP: (103-150)/(56-89) 145/83    Physical Exam:  General: NAD  Heart:: RRR  Lungs: clear  Abdomen: gravid. NST is mild to moderate variability and 10 x10 accels and some 15 x 15 accels.  Genitalia: SVE fingertip/10%/-3  Extremities: no calf tenderness  Psychological: AAOx3      Labs: Hgb 9.7      Assessment/Plan: 31yo  at 39.1 weeks with A2GDM, CHTN, non reactive FHT and remote from delivery. Proceed with Primary LTCS       I discussed the patients findings and my recommendations with family.    Prachi Kim DO  20  10:19 EST        Electronically signed by Prachi Kim DO at 20 1028     Imelda Rayo MD at 12/15/20 1301        Ms. Branch is a antonia 31 yo  with an IUP @ 39.1 weeks admitted for IOL for CHTN and GDMA2. She is on labetalol 50 mg BID and Glyburide 5 mg BID. Her Bps have ranged form 100s-120s/50-80s and her FBS was 73 this morning. Her Glyburide was held last night and this morning as she was NPO. Her prenatal care has also been complicated by h/o PCOS, RNI, GERD, and anemia with an admission HgB of 9.7. She is GBS negative and her cervix is fingertip dilated, long and - 3 station and the fetal head is palpable.. She has received Cervdil and we discussed that her IOL may take some time as her cervix is not favorable. FHTs are in the 130-140s with accels noted and no decels appreciated.  Ms. Branch is comfortable and feels only mild cramping.   Imelda Rayo MD      Electronically signed by Imelda Rayo MD at 12/15/20 1310            Operative/Procedure Notes (all)      Prachi Kim DO at 20 1138  Version 1 of 1          Princess Hopper   Section Operative Note    Pre-Operative  Dx:   1) 30 y.o.   2) IUP at 39.2 weeks  3) Indications for  section: Nonreactive NSt and remote from delivery  4) CHTN  5) A2GDM  6) anemia         Postoperative dx:    1.  Same     Procedure: , Low Transverse    Surgeon: Prachi Kim     Assistant: REBECCA Brady   Anesthesia: Spinal    EBL:   mls.  1000 mL mls.         IV Fluids: 900 mls.   UOP: 1200 mls.    I/O this shift:  In: 900 [I.V.:900]  Out: 2200 [Urine:1200; Blood:1000]   Antibiotics: cefazolin (Ancef)     Infant:      Time of Delivery     Gender: male  infant    Weight: 3530 g (7 lb 12.5 oz)     Apgars: 9  @ 1 minute /     9  @ 5 minutes      Meconium:   Nuchal Cord:    no  no            Indication for C/Section:                             non reactive NST, remote from delivery         Procedure Details:    29yo  at 39.2 weeks for IOL due to A2GDM and CHTN. Pt has an unfavorable cervix. She received Cervidil yesterdy and her cervix is unchanged. She was started on Pitocin last night but it had to be stopped due to decreased variability. Pt has had decreased fetal variability throughout the pregnancy and has always needed additional monitoring on L and D due to nonreactive NST in office. SVE fingertip/10%/-4. Discussed with pt and FOB that she is remote from delivery and if fetus is not tolerating pitocin then it will be difficult to get her into labor. Offered to proceed with primary LTCS and they accept.      Once all questions were answered, the patient was given IV antibiotics for ID prophylaxis. Pt was taken to the OR where spinal anesthesia was administered. A higgins catheter was placed and hung to gravity for the duration of the procedure. SCD's were placed on the lower extremities bilaterally for DVT prophylaxis. Once the pt was prepped and draped and anesthesia was found to be adequate, a Pfannensteil skin incision was made on the abdomen and carried down to the fascia. The fascia was incised and extended with Refugio  scissors. Kocher clamps were placed on the superior and inferior aspect of the fascia and the rectus abdominal muscles were sharply and bluntly taken down. The rectus abdominal muscles were  in the midline and the periteoneum was entered and bluntly extended. A bladder blade was then inserted and a bladder flap was created. A low transverse incision was made on the uterus and bluntly extended. Artificial rupture of membranes was performed with clear fluid noted. The fetal head was delivered followed by the rest of the body. Cord was clamped and cut and baby taken to the warmer. Cord blood was collected and the placenta was delivered. The uterus was exteriorized and cleared of all clots and debris. The uterine incision was repaired in 2 layers. The first layer was a running and locked fashion with 0-Vicryl and the second layer was an imbricating repair with 0-Vicryl suture. The uterus was firm and hemostatic. The abdomen was irrigated and aspirated with warm normal saline. The uterus was placed back into the abdomen. The fascia was reapproximated with 0-vicryl suture. The subcutaneous layer was irrigated and cauterized as needed for hemostasis. The skin was reapproximated with 4-0 vicryl. Pt tolerated the procedure well. Mom and baby are stable and progressing well.      Complications:   None      Disposition:   Mother to Mother Baby/Postpartum  in stable condition currently.   Baby to remains with mom  in stable condition currently.       Prachi Kim DO  12/16/2020  11:38 EST      Electronically signed by Prachi Kim DO at 12/16/20 1140         Maternal Vitals (since admission)     Date/Time   Temp   Pulse   Resp   BP   SpO2    12/16/20 1530   --   72   --   121/72   --    12/16/20 1526   98.2 (36.8)   66   18   140/69   95    12/16/20 1429   --   62   --   113/86   99    12/16/20 1415   --   --   16   --   --    12/16/20 1323   --   67   18   124/87   99    12/16/20 1307   --   66   18   128/80    98    12/16/20 1233   --   57   16   115/75   98    12/16/20 1219   --   70   --   119/73   --    12/16/20 1216   --   63   15   --   96    12/16/20 1203   --   68   --   --   --    12/16/20 1202   97.5 (36.4)   73   12   123/80   96    12/16/20 1147   --   72   --   119/64   --    12/16/20 1146   --   72   --   119/64   --    12/16/20 1145   --   76   16   --   94    12/16/20 1129   --   --   18   --   93    12/16/20 1121   --   69   --   98/73   --    12/16/20 0945   --   75   --   103/51   --    12/16/20 0919   --   74   --   112/59   --    12/16/20 0844   --   76   --   145/83   --    12/16/20 0717   97.9 (36.6)   69   18   124/77   98    12/16/20 0218   98 (36.7)   80   18   124/65   99    12/15/20 2039   --   79   --   150/85   --    12/15/20 2037   --   79   --   150/85   --    12/15/20 1909   98.3 (36.8)   93   18   144/85   98    12/15/20 1720   --   82   --   136/85   --    12/15/20 1705   --   81   --   136/85   --    12/15/20 1648   --   73   --   136/84   --    12/15/20 1546   98.4 (36.9)   86   18   110/71   --    12/15/20 1306   --   104   18   136/86   --    12/15/20 1205   --   82   18   124/76   --    12/15/20 1144   --   74   18   128/89   --    12/15/20 1105   --   72   20   103/56   --    12/15/20 1005   --   72   20   100/55   --    12/15/20 0905   --   81   20   125/81   --    12/15/20 0805   97.9 (36.6)   72   20   127/73   --    12/15/20 0649   --   108   --   109/79   --    12/15/20 0648   98.4 (36.9)   106   20   --   99            FHR (since admission)     Date/Time Fetal HR Assessment Method Fetal HR (beats/min) Fetal Heart Baseline Rate Fetal HR Variability Fetal HR Accelerations Fetal HR Decelerations Fetal HR Tracing Category    12/16/20 1022  external  146 in OR  --  --  --  --  --    12/16/20 1008  external  (Pended)   145  (Pended)   normal range  (Pended)   moderate (amplitude range 6 to 25 bpm)  (Pended)   greater than/equal to 15 bpm;lasting at least 15 seconds  (Pended)    absent  (Pended)   --    12/16/20 1000  external  140  normal range  moderate (amplitude range 6 to 25 bpm) w/ periods of minimal  greater than/equal to 15 bpm;lasting at least 15 seconds  absent  --    12/16/20 0930  external  140  normal range  moderate (amplitude range 6 to 25 bpm)  greater than/equal to 15 bpm;lasting at least 15 seconds  absent  --    12/16/20 0900  external  135  normal range  minimal (detectable, amplitude less than or equal to 5 bpm)  absent  absent  --    12/16/20 0830  external  140  normal range  moderate (amplitude range 6 to 25 bpm)  greater than/equal to 15 bpm;lasting at least 15 seconds  absent  --    12/16/20 0800  external  135  normal range  minimal (detectable, amplitude less than or equal to 5 bpm) w/ periods of moderate  greater than/equal to 15 bpm;lasting at least 15 seconds  absent  --    12/16/20 0730  external  130  normal range  moderate (amplitude range 6 to 25 bpm)  greater than/equal to 15 bpm;lasting at least 15 seconds  absent  --    12/16/20 0700  external  135  normal range  moderate (amplitude range 6 to 25 bpm)  greater than/equal to 15 bpm;lasting at least 15 seconds  absent  --    12/16/20 0630  external  135  normal range  moderate (amplitude range 6 to 25 bpm)  greater than/equal to 15 bpm;lasting at least 15 seconds  absent  --    12/16/20 0600  external  140  normal range  moderate (amplitude range 6 to 25 bpm)  greater than/equal to 15 bpm;lasting at least 15 seconds  absent  --    12/16/20 0530  external  135  normal range  moderate (amplitude range 6 to 25 bpm)  greater than/equal to 15 bpm;lasting at least 15 seconds  absent  --    12/16/20 0500  external  135  normal range  moderate (amplitude range 6 to 25 bpm)  greater than/equal to 15 bpm;lasting at least 15 seconds  absent  --    12/16/20 0430  external  135  normal range  moderate (amplitude range 6 to 25 bpm)  greater than/equal to 15 bpm;lasting at least 15 seconds  absent  --    12/16/20 0400   external  135  normal range  moderate (amplitude range 6 to 25 bpm)  greater than/equal to 15 bpm;lasting at least 15 seconds  absent  --    12/16/20 0330  external  140  normal range  moderate (amplitude range 6 to 25 bpm)  greater than/equal to 15 bpm;lasting at least 15 seconds  absent  --    12/16/20 0300  external  135  normal range  moderate (amplitude range 6 to 25 bpm)  greater than/equal to 15 bpm;lasting at least 15 seconds  absent  --    12/16/20 0245  external  140  normal range  moderate (amplitude range 6 to 25 bpm)  greater than/equal to 15 bpm;lasting at least 15 seconds  absent  --    12/16/20 0230  external  140  normal range  minimal (detectable, amplitude less than or equal to 5 bpm)  greater than/equal to 15 bpm;lasting at least 15 seconds  absent  --    12/16/20 0215  external  140  normal range  moderate (amplitude range 6 to 25 bpm)  greater than/equal to 15 bpm;lasting at least 15 seconds  absent  --    12/16/20 0200  external  145  normal range  moderate (amplitude range 6 to 25 bpm)  greater than/equal to 15 bpm;lasting at least 15 seconds  absent  --    12/16/20 0130  external  145  normal range  moderate (amplitude range 6 to 25 bpm)  greater than/equal to 15 bpm;lasting at least 15 seconds  absent  --    12/16/20 0100  external  155  normal range  moderate (amplitude range 6 to 25 bpm)  greater than/equal to 15 bpm;lasting at least 15 seconds  absent  --    12/16/20 0030  external  145  normal range  moderate (amplitude range 6 to 25 bpm)  greater than/equal to 15 bpm;lasting at least 15 seconds  absent  --    12/16/20 0000  external  145  normal range  moderate (amplitude range 6 to 25 bpm)  greater than/equal to 15 bpm;lasting at least 15 seconds  absent  --    12/15/20 2330  external  145  normal range  moderate (amplitude range 6 to 25 bpm)  greater than/equal to 15 bpm;lasting at least 15 seconds  absent  --    12/15/20 2300  external  140  normal range  moderate (amplitude range 6  to 25 bpm)  greater than/equal to 15 bpm;lasting at least 15 seconds  absent  --    12/15/20 2230  external  140  normal range  moderate (amplitude range 6 to 25 bpm)  greater than/equal to 15 bpm;lasting at least 15 seconds  absent  --    12/15/20 2040  external  145  normal range  moderate (amplitude range 6 to 25 bpm)  greater than/equal to 15 bpm;lasting at least 15 seconds  absent  --    12/15/20 2039  --  -- monitor tracing maternal heart tones  --  --  --  --  --    12/15/20 2030  external  155  normal range  moderate (amplitude range 6 to 25 bpm)  greater than/equal to 15 bpm;lasting at least 15 seconds  absent  --    12/15/20 2000  external  155  normal range  moderate (amplitude range 6 to 25 bpm)  greater than/equal to 15 bpm;lasting at least 15 seconds  absent  --    12/15/20 1930  external  155  normal range  moderate (amplitude range 6 to 25 bpm)  greater than/equal to 15 bpm;lasting at least 15 seconds  absent  --    12/15/20 1830  external  145  normal range  moderate (amplitude range 6 to 25 bpm)  greater than/equal to 15 bpm;lasting at least 15 seconds  absent  --    12/15/20 1800  external  145  normal range  moderate (amplitude range 6 to 25 bpm)  greater than/equal to 15 bpm;lasting at least 15 seconds  absent  --    12/15/20 1730  external  150  normal range  moderate (amplitude range 6 to 25 bpm)  greater than/equal to 15 bpm;lasting at least 15 seconds  absent  --    12/15/20 1700  external  145  normal range  moderate (amplitude range 6 to 25 bpm)  greater than/equal to 15 bpm;lasting at least 15 seconds  absent  --    12/15/20 1630  external  150  normal range  minimal (detectable, amplitude less than or equal to 5 bpm)  greater than/equal to 15 bpm;lasting at least 15 seconds  absent  --    12/15/20 1600  external  150  normal range  moderate (amplitude range 6 to 25 bpm)  greater than/equal to 15 bpm;lasting at least 15 seconds  absent  --    12/15/20 1530  external  150  normal range   moderate (amplitude range 6 to 25 bpm)  greater than/equal to 15 bpm;lasting at least 15 seconds  absent  --    12/15/20 1500  external  150  normal range  moderate (amplitude range 6 to 25 bpm) w/ times of minimal  greater than/equal to 15 bpm;lasting at least 15 seconds  absent  --    12/15/20 1430  external  140  normal range  moderate (amplitude range 6 to 25 bpm) w/ times of minimal  greater than/equal to 15 bpm;lasting at least 15 seconds  absent  --    12/15/20 1400  external  140  normal range  moderate (amplitude range 6 to 25 bpm)  greater than/equal to 15 bpm;lasting at least 15 seconds  absent  --    12/15/20 1330  external  145  normal range  moderate (amplitude range 6 to 25 bpm)  greater than/equal to 15 bpm;lasting at least 15 seconds  absent  --    12/15/20 1300  external  140  normal range  moderate (amplitude range 6 to 25 bpm)  greater than/equal to 15 bpm;lasting at least 15 seconds  absent  --    12/15/20 1230  external  140  normal range  moderate (amplitude range 6 to 25 bpm)  greater than/equal to 15 bpm;lasting at least 15 seconds  absent  --    12/15/20 1200  external  140  normal range  moderate (amplitude range 6 to 25 bpm) periods of minimal  greater than/equal to 15 bpm;lasting at least 15 seconds  absent  --    12/15/20 1130  external  140  normal range  moderate (amplitude range 6 to 25 bpm)  greater than/equal to 15 bpm;lasting at least 15 seconds  absent  --    12/15/20 1100  external  140  normal range  moderate (amplitude range 6 to 25 bpm)  greater than/equal to 15 bpm;lasting at least 15 seconds  absent  --    12/15/20 1030  external  140  normal range  moderate (amplitude range 6 to 25 bpm)  greater than/equal to 15 bpm;lasting at least 15 seconds  absent  --    12/15/20 1000  external  140  normal range  moderate (amplitude range 6 to 25 bpm)  greater than/equal to 15 bpm;lasting at least 15 seconds  absent  --    12/15/20 0930  external  140  normal range  moderate  (amplitude range 6 to 25 bpm)  greater than/equal to 15 bpm;lasting at least 15 seconds  absent  --    12/15/20 0900  external  140  normal range  moderate (amplitude range 6 to 25 bpm)  greater than/equal to 15 bpm;lasting at least 15 seconds  absent  --    12/15/20 0830  external  140  normal range  moderate (amplitude range 6 to 25 bpm) minimal at times  greater than/equal to 15 bpm;lasting at least 15 seconds  absent  --    12/15/20 0800  external  140  normal range  minimal (detectable, amplitude less than or equal to 5 bpm)  greater than/equal to 15 bpm;lasting at least 15 seconds  absent  --    12/15/20 0730  external  140  normal range  minimal (detectable, amplitude less than or equal to 5 bpm)  absent  absent  --    12/15/20 0700  external  145  normal range  minimal (detectable, amplitude less than or equal to 5 bpm)  absent  absent  --        Uterine Activity (since admission)     Date/Time Method Contraction Frequency (Minutes) Contraction Duration (sec) Contraction Intensity Uterine Resting Tone Contraction Pattern    12/16/20 1008  palpation;external tocotransducer  (Pended)   --  --  no contractions  (Pended)   soft by palpation  (Pended)   --    12/16/20 1000  palpation;external tocotransducer  4-6    mild by palpation  soft by palpation  --    12/16/20 0930  palpation;external tocotransducer  3-7.5    mild by palpation  soft by palpation  --    12/16/20 0900  palpation;external tocotransducer  --  --  no contractions  soft by palpation  --    12/16/20 0830  palpation;external tocotransducer  --  --  no contractions  soft by palpation  --    12/16/20 0800  palpation;external tocotransducer  1 cont in 30 min   120  mild by palpation  soft by palpation  --    12/16/20 0730  palpation;external tocotransducer  x2 cont   40  mild by palpation  soft by palpation  --    12/16/20 0700  external tocotransducer;palpation  --  --  no contractions  soft by palpation  --    12/16/20 0630  external  tocotransducer;palpation  --  --  no contractions  soft by palpation  --    12/16/20 0600  external tocotransducer;palpation  --  --  no contractions  soft by palpation  --    12/16/20 0530  external tocotransducer;palpation  x2  40-70  mild by palpation  soft by palpation  --    12/16/20 0500  external tocotransducer;palpation  --  --  no contractions  soft by palpation  --    12/16/20 0430  external tocotransducer;palpation  x2  40-60  mild by palpation  soft by palpation  --    12/16/20 0400  external tocotransducer;palpation  --  --  no contractions  soft by palpation  --    12/16/20 0330  external tocotransducer;palpation  --  --  no contractions  soft by palpation  --    12/16/20 0300  external tocotransducer;palpation  --  --  no contractions  soft by palpation  --    12/16/20 0245  external tocotransducer;palpation  x1  60  mild by palpation  soft by palpation  --    12/16/20 0230  external tocotransducer;palpation  x2    mild by palpation  soft by palpation  --    12/16/20 0215  external tocotransducer;palpation  x1  60  no contractions  soft by palpation  --    12/16/20 0200  external tocotransducer;palpation  --  --  no contractions  soft by palpation  --    12/16/20 0130  external tocotransducer;palpation  --  --  no contractions  soft by palpation  --    12/16/20 0100  external tocotransducer;palpation  --  --  no contractions  soft by palpation  --    12/16/20 0030  external tocotransducer;palpation  --  --  no contractions  soft by palpation  --    12/16/20 0000  external tocotransducer;palpation  --  --  no contractions  soft by palpation  --    12/15/20 2330  external tocotransducer;palpation  --  --  no contractions  soft by palpation  --    12/15/20 2300  external tocotransducer;palpation  --  --  no contractions  soft by palpation  --    12/15/20 2230  external tocotransducer;palpation  --  --  no contractions  soft by palpation  --    12/15/20 2040  external tocotransducer;palpation  --   --  no contractions  soft by palpation  --    12/15/20 2030  external tocotransducer;palpation  x2    mild by palpation  soft by palpation  --    12/15/20 2000  external tocotransducer;palpation  x3    mild by palpation  soft by palpation  --    12/15/20 1930  external tocotransducer;palpation  x2    mild by palpation  soft by palpation  --    12/15/20 1830  palpation;external tocotransducer  x2    mild by palpation  soft by palpation  --    12/15/20 1800  palpation;external tocotransducer  2-11    mild by palpation  soft by palpation  --    12/15/20 1730  palpation;external tocotransducer  2-10    mild by palpation  soft by palpation  --    12/15/20 1700  palpation;external tocotransducer  UTD  --  --  soft by palpation  --    12/15/20 1630  palpation;external tocotransducer  UTD  --  --  soft by palpation  --    12/15/20 1600  palpation;external tocotransducer  10    mild by palpation  soft by palpation  --    12/15/20 1530  palpation;external tocotransducer  x2 in 30 min    mild by palpation  soft by palpation  --    12/15/20 1500  palpation;external tocotransducer  x1  60  mild by palpation  soft by palpation  --    12/15/20 1430  palpation;external tocotransducer  2-5    mild by palpation  soft by palpation  --    12/15/20 1400  palpation;external tocotransducer  6    mild by palpation  soft by palpation  --    12/15/20 1330  palpation;external tocotransducer  4  40-60  mild by palpation  soft by palpation  --    12/15/20 1300  palpation;external tocotransducer  --  --  --  soft by palpation  --    12/15/20 1230  palpation;external tocotransducer  --  --  --  soft by palpation  --    12/15/20 1200  palpation;external tocotransducer  --  --  --  soft by palpation  --    12/15/20 1130  palpation;external tocotransducer  11-13  60-80  mild by palpation  soft by palpation  --    12/15/20 1100  palpation;external tocotransducer  --  --  --  soft by  palpation  --    12/15/20 1030  palpation;external tocotransducer  --  --  --  soft by palpation  --    12/15/20 1000  palpation;external tocotransducer  x1  --  mild by palpation  soft by palpation  --    12/15/20 0930  palpation;external tocotransducer  --  --  --  soft by palpation  --    12/15/20 0900  palpation;external tocotransducer  --  --  --  soft by palpation  --    12/15/20 0830  palpation;external tocotransducer  x1  80  mild by palpation  soft by palpation  --    12/15/20 0800  palpation;external tocotransducer  x1  140  mild by palpation  soft by palpation  --    12/15/20 0730  palpation;external tocotransducer  x2  100-140  mild by palpation  soft by palpation  --    12/15/20 0700  palpation;external tocotransducer  --  --  mild by palpation  soft by palpation  --        Labor Pain (since admission)     Date/Time (0-10) Pain Rating: Rest (0-10) Pain Rating: Activity Pain Management Interventions    12/16/20 0717  0  0  --    12/16/20 0218  0  0  no interventions per patient request    12/15/20 1909  1  2  pillow support provided;position adjusted    12/15/20 1546  2  2  quiet environment facilitated    12/15/20 0800  0  0  --

## 2020-12-16 NOTE — H&P
Patient Care Team:  Rosetta Adams MD as PCP - General (Family Medicine)    Chief complaint: primary LTCS       HPI: 31yo  at 39.2 weeks for IOL due to A2GDM and CHTN. Pt has an unfavorable cervix. She received Cervidil yesterdy and her cervix is unchanged. She was started on Pitocin last night but it had to be stopped due to decreased variability. Pt has had decreased fetal variability throughout the pregnancy and has always needed additional monitoring on L and D due to nonreactive NST in office. SVE fingertip/10%/-4. Discussed with pt and FOB that she is remote from delivery and if fetus is not tolerating pitocin then it will be difficult to get her into labor. Offered to proceed with primary LTCS and they accept.     Debilities: None    Emotional Behavior: Appropriate    PMH:   Past Medical History:   Diagnosis Date   • Anemia    • Asthma    • Female infertility    • Gestational diabetes    • Headache    • Hypertension    • PCOS (polycystic ovarian syndrome) 2013   • Polycystic ovary syndrome          PSH:   Past Surgical History:   Procedure Laterality Date   • ADENOIDECTOMY     • DIAGNOSTIC LAPAROSCOPY N/A 2020    Procedure: DIAGNOSTIC LAPAROSCOPY;  Surgeon: Imelda Rayo MD;  Location: [facility];  Service: Obstetrics/Gynecology;  Laterality: N/A;   • TONSILLECTOMY  2008    adnoids also   • US GUIDED CYST ASPIRATION BREAST N/A 2020       SoHx:   Social History     Socioeconomic History   • Marital status:      Spouse name: Not on file   • Number of children: Not on file   • Years of education: Not on file   • Highest education level: Not on file   Occupational History   • Occupation: cna     Employer: [employer]   Social Needs   • Financial resource strain: Not hard at all   • Food insecurity     Worry: Never true     Inability: Never true   • Transportation needs     Medical: No     Non-medical: No   Tobacco Use   • Smoking status: Former Smoker      Types: Cigarettes     Quit date: 2020     Years since quittin.5   • Smokeless tobacco: Never Used   Substance and Sexual Activity   • Alcohol use: Not Currently   • Drug use: No   • Sexual activity: Yes     Partners: Male     Birth control/protection: None   Lifestyle   • Physical activity     Days per week: 4 days     Minutes per session: 60 min   • Stress: Not at all       FHx:   Family History   Problem Relation Age of Onset   • Hypertension Mother    • Cancer Mother    • Hypertension Maternal Grandmother    • Diabetes Maternal Grandfather    • Hypertension Paternal Grandmother    • Diabetes Paternal Grandfather    • Deep vein thrombosis Maternal Aunt    • Breast cancer Neg Hx    • Colon cancer Neg Hx    • Pulmonary embolism Neg Hx    • Anesthesia problems Neg Hx          Allergies: Patient has no known allergies.    Medications:   No current facility-administered medications on file prior to encounter.      Current Outpatient Medications on File Prior to Encounter   Medication Sig Dispense Refill   • Blood Glucose Monitoring Suppl (Accu-Chek Ana Plus) w/Device kit CHECK BLOOD SUGAR 4 TIMES DAILY     • ferrous gluconate (FERGON) 324 MG tablet Take 1 tablet by mouth Daily With Breakfast. (Patient taking differently: Take 324 mg by mouth 3 (Three) Times a Day.) 30 tablet 6   • glucose blood test strip Check blood sugars 4 times daily 120 each 6   • glucose monitor monitoring kit Check blood sugar 4 times daily 1 each 1   • glyburide (DIAbeta) 5 MG tablet Take 1 tablet by mouth Daily With Dinner. (Patient taking differently: Take 5 mg by mouth 2 (Two) Times a Day With Meals.) 30 tablet 6   • labetalol (NORMODYNE) 100 MG tablet One half of tablet by mouth daily (Patient taking differently: Take 50 mg by mouth. One half of tablet by mouth daily) 30 tablet 6   • Lancets misc Check blood sugar 4 times daily 120 each 6   • pantoprazole (PROTONIX) 40 MG EC tablet Take 1 tablet by mouth Daily. 30 tablet 6   •  Prenat-Fe Carbonyl-FA-Omega 3 (ONE-A-DAY WOMENS PRENATAL 1) 28-0.8-235 MG capsule Take 1 capsule by mouth Daily.     • sucralfate (CARAFATE) 1 g tablet Take 1 tablet by mouth 4 (Four) Times a Day. Dissolve in 10 cc water 120 tablet 5             Vital Signs  Temp:  [97.9 °F (36.6 °C)-98.4 °F (36.9 °C)] 97.9 °F (36.6 °C)  Heart Rate:  [] 76  Resp:  [18-20] 18  BP: (103-150)/(56-89) 145/83    Physical Exam:  General: NAD  Heart:: RRR  Lungs: clear  Abdomen: gravid. NST is mild to moderate variability and 10 x10 accels and some 15 x 15 accels.  Genitalia: SVE fingertip/10%/-3  Extremities: no calf tenderness  Psychological: AAOx3      Labs: Hgb 9.7      Assessment/Plan: 31yo  at 39.1 weeks with A2GDM, CHTN, non reactive FHT and remote from delivery. Proceed with Primary LTCS       I discussed the patients findings and my recommendations with family.    Prachi Kim DO  20  10:19 EST

## 2020-12-16 NOTE — L&D DELIVERY NOTE
[facility]   Section Operative Note    Pre-Operative Dx:   1) 30 y.o.   2) IUP at 39.2 weeks  3) Indications for  section: Nonreactive NSt and remote from delivery  4) CHTN  5) A2GDM  6) anemia         Postoperative dx:    1.  Same     Procedure: , Low Transverse    Surgeon: Prachi Kim     Assistant: REBECCA Brady   Anesthesia: Spinal    EBL:   mls.  1000 mL mls.         IV Fluids: 900 mls.   UOP: 1200 mls.    I/O this shift:  In: 900 [I.V.:900]  Out: 2200 [Urine:1200; Blood:1000]   Antibiotics: cefazolin (Ancef)     Infant:      Time of Delivery     Gender: male  infant    Weight: 3530 g (7 lb 12.5 oz)     Apgars: 9  @ 1 minute /     9  @ 5 minutes      Meconium:   Nuchal Cord:    no  no            Indication for C/Section:                             non reactive NST, remote from delivery         Procedure Details:    31yo  at 39.2 weeks for IOL due to A2GDM and CHTN. Pt has an unfavorable cervix. She received Cervidil yesterdy and her cervix is unchanged. She was started on Pitocin last night but it had to be stopped due to decreased variability. Pt has had decreased fetal variability throughout the pregnancy and has always needed additional monitoring on L and D due to nonreactive NST in office. SVE fingertip/10%/-4. Discussed with pt and FOB that she is remote from delivery and if fetus is not tolerating pitocin then it will be difficult to get her into labor. Offered to proceed with primary LTCS and they accept.      Once all questions were answered, the patient was given IV antibiotics for ID prophylaxis. Pt was taken to the OR where spinal anesthesia was administered. A higgins catheter was placed and hung to gravity for the duration of the procedure. SCD's were placed on the lower extremities bilaterally for DVT prophylaxis. Once the pt was prepped and draped and anesthesia was found to be adequate, a Pfannensteil skin incision was made on the abdomen and carried down  to the fascia. The fascia was incised and extended with Huff scissors. Kocher clamps were placed on the superior and inferior aspect of the fascia and the rectus abdominal muscles were sharply and bluntly taken down. The rectus abdominal muscles were  in the midline and the periteoneum was entered and bluntly extended. A bladder blade was then inserted and a bladder flap was created. A low transverse incision was made on the uterus and bluntly extended. Artificial rupture of membranes was performed with clear fluid noted. The fetal head was delivered followed by the rest of the body. Cord was clamped and cut and baby taken to the warmer. Cord blood was collected and the placenta was delivered. The uterus was exteriorized and cleared of all clots and debris. The uterine incision was repaired in 2 layers. The first layer was a running and locked fashion with 0-Vicryl and the second layer was an imbricating repair with 0-Vicryl suture. The uterus was firm and hemostatic. The abdomen was irrigated and aspirated with warm normal saline. The uterus was placed back into the abdomen. The fascia was reapproximated with 0-vicryl suture. The subcutaneous layer was irrigated and cauterized as needed for hemostasis. The skin was reapproximated with 4-0 vicryl. Pt tolerated the procedure well. Mom and baby are stable and progressing well.      Complications:   None      Disposition:   Mother to Mother Baby/Postpartum  in stable condition currently.   Baby to remains with mom  in stable condition currently.       Prachi Kim DO  12/16/2020  11:38 EST

## 2020-12-16 NOTE — ANESTHESIA PROCEDURE NOTES
Spinal Block      Start Time: 12/16/2020 10:20 AM  Stop Time: 12/16/2020 10:22 AM  Preanesthetic Checklist  Completed: patient identified, site marked, surgical consent, pre-op evaluation, timeout performed, IV checked, risks and benefits discussed and monitors and equipment checked  Spinal Block Prep:  Patient Position:sitting  Sterile Tech:cap, gloves, mask and sterile barriers  Prep:Betadine and Chloraprep  Patient Monitoring:blood pressure monitoring and continuous pulse oximetry  Spinal Block Procedure  Approach:midline  Guidance:landmark technique  Location:L2-L3  Needle Type:Sprotte  Needle Gauge:25 G  Placement of Spinal needle event:cerebrospinal fluid aspirated  Paresthesia: no  Fluid Appearance:clear  Medications: bupivacaine PF (MARCAINE) injection 0.75%, 1.7 mL  Med Administered at 12/16/2020 10:22 AM   Post Assessment  Patient Tolerance:patient tolerated the procedure well with no apparent complications  Complications no

## 2020-12-17 LAB
HCT VFR BLD AUTO: 27.4 % (ref 34–46.6)
HGB BLD-MCNC: 8.6 G/DL (ref 12–15.9)

## 2020-12-17 PROCEDURE — 85018 HEMOGLOBIN: CPT | Performed by: OBSTETRICS & GYNECOLOGY

## 2020-12-17 PROCEDURE — 99232 SBSQ HOSP IP/OBS MODERATE 35: CPT | Performed by: NURSE PRACTITIONER

## 2020-12-17 PROCEDURE — 85014 HEMATOCRIT: CPT | Performed by: OBSTETRICS & GYNECOLOGY

## 2020-12-17 RX ADMIN — OXYCODONE HYDROCHLORIDE AND ACETAMINOPHEN 2 TABLET: 5; 325 TABLET ORAL at 14:38

## 2020-12-17 RX ADMIN — IBUPROFEN 800 MG: 800 TABLET, FILM COATED ORAL at 19:26

## 2020-12-17 RX ADMIN — OXYCODONE HYDROCHLORIDE AND ACETAMINOPHEN 2 TABLET: 5; 325 TABLET ORAL at 19:26

## 2020-12-17 RX ADMIN — HYDROCODONE BITARTRATE AND ACETAMINOPHEN 1 TABLET: 5; 325 TABLET ORAL at 04:33

## 2020-12-17 RX ADMIN — PRENATAL VIT W/ FE FUMARATE-FA TAB 27-0.8 MG 1 TABLET: 27-0.8 TAB at 09:27

## 2020-12-17 RX ADMIN — FERROUS GLUCONATE TAB 324 MG (37.5 MG ELEMENTAL IRON) 324 MG: 324 (37.5 FE) TAB at 09:27

## 2020-12-17 RX ADMIN — PANTOPRAZOLE SODIUM 40 MG: 40 TABLET, DELAYED RELEASE ORAL at 09:26

## 2020-12-17 RX ADMIN — HYDROCODONE BITARTRATE AND ACETAMINOPHEN 1 TABLET: 5; 325 TABLET ORAL at 10:34

## 2020-12-17 NOTE — PROGRESS NOTES
[facility]   PROGRESS NOTE    Post-Op Day 1 S/P   Subjective   Subjective  Patient reports:  Pain is well controlled with IV toradol and Norco. .  She is up and ambulating. Tolerating diet. Tolerating po -- normal for liquids and normal for solids.  Intake -- c/o of tolerating po solids and tolerating po liquids.   Voiding - without difficulty; flatus reported..  Vaginal bleeding is as much as expected.    Objective    Objective     Vitals: Vital Signs Range for the last 24 hours  Temperature: Temp:  [97.5 °F (36.4 °C)-98.4 °F (36.9 °C)] 98.4 °F (36.9 °C)   Temp Source: Temp src: Oral   BP: BP: ()/(51-95) 130/76   Pulse: Heart Rate:  [57-77] 77   Respirations: Resp:  [12-18] 18   SPO2: SpO2:  [93 %-99 %] 97 %   O2 Amount (l/min):     O2 Devices Device (Oxygen Therapy): room air   Weight:              Physical Exam    Lungs clear to auscultation bilaterally   Abdomen Soft, bowel sounds present   Incision  Dressing C/D/I, op site intact   Extremities edema 1+ edema in feet, legs trace edema. and Homans sign is negative, no sign of DVT     I reviewed the patient's new clinical results.    Assessment/Plan        Normal labor and delivery    S/P  section    Assessment & Plan    Assessment:    Es Branch is Day 1  post-partum  , Low Transverse   .      Plan:  1) Postpartum day #1- S/P PLTCS. Hgb 8.6g/dL. Rh +.      2) Postpartum care- Advance. Enc ambulation and IS use.     3) Postpartum anemia- Hgb 8.6g/dL. HD stable. Continue ferrous gluconate.     4) CHTN- Labetalol has been on hold since IOL. BP well controlled without medication. Plan to monitor BP. May need to restart when patient is more active.     5) GDMA2- Needs 2hr GTT @ 6 weeks postparutm    6) Male- Breast and bottle. Desires circumcision.     7) Dispo- Consider home tomorrow if feeling well.       María Elena Arthur, NORMAN  20  09:28 EST

## 2020-12-17 NOTE — PROGRESS NOTES
Patient: Es Branch  Procedure(s):   SECTION PRIMARY  Anesthesia type: spinal, ITN    Patient location: Labor and Delivery  Vitals:    20 1729 20 0757 20 1115 20 1527   BP: 120/73 130/76 133/70 119/73   BP Location:  Right arm Right arm Right arm   Patient Position:  Lying Lying Sitting   Pulse: 75 77 78 77   Resp:  18 18 18   Temp:  98.4 °F (36.9 °C) 98.5 °F (36.9 °C) 98 °F (36.7 °C)   TempSrc:  Oral Oral Oral   SpO2:  97% 96% 97%     Level of consciousness: awake, alert and oriented    Post-anesthesia pain: adequate analgesia  Airway patency: patent  Respiratory: unassisted  Cardiovascular: stable and blood pressure at baseline  Hydration: euvolemic    Anesthetic complications: no   URIEL w/ PEK OU-The use/continuation of artificial tears were recommended.

## 2020-12-17 NOTE — PLAN OF CARE
Problem: Adult Inpatient Plan of Care  Goal: Plan of Care Review  Outcome: Ongoing, Progressing  Flowsheets  Taken 12/17/2020 1612 by Sabine Scott, RN  Progress: improving  Outcome Summary: vss,mom doing well up walking, output good. good pain control  Taken 12/16/2020 1659 by Haley Pepe, RN  Plan of Care Reviewed With:   spouse   patient   Goal Outcome Evaluation:  Plan of Care Reviewed With: spouse, patient  Progress: improving  Outcome Summary: elenamom doing well up walking, output good. good pain control

## 2020-12-18 VITALS
DIASTOLIC BLOOD PRESSURE: 73 MMHG | SYSTOLIC BLOOD PRESSURE: 125 MMHG | HEART RATE: 86 BPM | RESPIRATION RATE: 18 BRPM | TEMPERATURE: 98.3 F | OXYGEN SATURATION: 98 %

## 2020-12-18 PROCEDURE — 90471 IMMUNIZATION ADMIN: CPT | Performed by: OBSTETRICS & GYNECOLOGY

## 2020-12-18 PROCEDURE — 99238 HOSP IP/OBS DSCHRG MGMT 30/<: CPT | Performed by: OBSTETRICS & GYNECOLOGY

## 2020-12-18 PROCEDURE — 90707 MMR VACCINE SC: CPT | Performed by: OBSTETRICS & GYNECOLOGY

## 2020-12-18 PROCEDURE — 25010000002 MEASLES, MUMPS & RUBELLA VAC RECONSTITUTED SOLUTION: Performed by: OBSTETRICS & GYNECOLOGY

## 2020-12-18 RX ORDER — PSEUDOEPHEDRINE HCL 30 MG
100 TABLET ORAL 2 TIMES DAILY PRN
Qty: 30 CAPSULE | Refills: 0 | Status: SHIPPED | OUTPATIENT
Start: 2020-12-18 | End: 2021-05-26

## 2020-12-18 RX ORDER — IBUPROFEN 800 MG/1
800 TABLET ORAL EVERY 8 HOURS PRN
Qty: 30 TABLET | Refills: 0 | Status: SHIPPED | OUTPATIENT
Start: 2020-12-18 | End: 2020-12-31

## 2020-12-18 RX ORDER — OXYCODONE HYDROCHLORIDE AND ACETAMINOPHEN 5; 325 MG/1; MG/1
2 TABLET ORAL EVERY 4 HOURS PRN
Qty: 30 TABLET | Refills: 0 | Status: SHIPPED | OUTPATIENT
Start: 2020-12-18 | End: 2020-12-24

## 2020-12-18 RX ADMIN — PANTOPRAZOLE SODIUM 40 MG: 40 TABLET, DELAYED RELEASE ORAL at 10:22

## 2020-12-18 RX ADMIN — OXYCODONE HYDROCHLORIDE AND ACETAMINOPHEN 2 TABLET: 5; 325 TABLET ORAL at 04:07

## 2020-12-18 RX ADMIN — MEASLES, MUMPS, AND RUBELLA VIRUS VACCINE LIVE 0.5 ML: 1000; 12500; 1000 INJECTION, POWDER, LYOPHILIZED, FOR SUSPENSION SUBCUTANEOUS at 10:24

## 2020-12-18 RX ADMIN — LABETALOL HYDROCHLORIDE 50 MG: 100 TABLET, FILM COATED ORAL at 10:23

## 2020-12-18 RX ADMIN — IBUPROFEN 800 MG: 800 TABLET, FILM COATED ORAL at 04:07

## 2020-12-18 RX ADMIN — FERROUS GLUCONATE TAB 324 MG (37.5 MG ELEMENTAL IRON) 324 MG: 324 (37.5 FE) TAB at 10:26

## 2020-12-18 RX ADMIN — PRENATAL VIT W/ FE FUMARATE-FA TAB 27-0.8 MG 1 TABLET: 27-0.8 TAB at 10:22

## 2020-12-18 NOTE — PAYOR COMM NOTE
"[facility]  [address] [place] [address]  Facility NPI: [id number]    Janice Moreno  Fax: [phone]  Phone: [phone] (Lizzy: [phone], Linda: [phone])  Email: [email or web address]    Date: 12/18/2020  To: ZAYDABROOKE   Fax: [phone]  Subject: D/C SUMMARY   Reference #: [id number]    Please don't hesitate to contact me with any questions or concerns.      Es Alicea (30 y.o. Female)     Date of Birth Social Security Number Address Home Phone MRN    1990  [address] [place] [address] [phone] [id number]    Rastafari Marital Status          None        Admission Date Admission Type Admitting Provider Attending Provider Department, Room/Bed    12/15/20 Elective Murphy Wood MD  [facility], 1120/1    Discharge Date Discharge Disposition Discharge Destination        12/18/2020 Home or Self Care              Attending Provider: (none)   Allergies: No Known Allergies    Isolation: None   Infection: None   Code Status: CPR    Ht: 175.3 cm (69\")   Wt: 104 kg (230 lb)    Admission Cmt: None   Principal Problem: None                Active Insurance as of 12/15/2020     Primary Coverage     Payor Plan Insurance Group Employer/Plan Group    ANTHEM MEDICAID ANTH MEDICAID [id number]     Payor Plan Address Payor Plan Phone Number Payor Plan Fax Number Effective Dates    [address] [address] [phone]  11/1/2020 - None Entered    [facility] [id number]       Subscriber Name Subscriber Birth Date Member ID       ES ALICEA 1990 [id number]                 Emergency Contacts      (Rel.) Home Phone Work Phone Mobile Phone    Stuart Rangel (Spouse) [phone] -- [phone]               Discharge Summary      Murphy Wood MD at 12/18/20 0949          Obstetrical Discharge Form    Primary OB Clinician: [facility]      Gestational Age: 39w1d    Antepartum complications: gestational diabetes and " CHTN    Date of Delivery:  2020     Delivered By: Prachi Lynch     Delivery Type: primary  section, low transverse incision    Tubal Ligation: n/a    Baby: Liveborn male, Apgars 9/9, weight 7 #, 13 oz    Anesthesia: epidural    Intrapartum complications: Failure to Progress    Laceration: n/a      Feeding method: breast      Discharge Date: 2020; Discharge Time: 09:49 EST    /73 (BP Location: Right arm, Patient Position: Sitting)   Pulse 86   Temp 98.3 °F (36.8 °C) (Oral)   Resp 18   LMP 2020 (Exact Date)   SpO2 98%   Breastfeeding Unknown      Abd: Soft, fundus firm, nontender.  Incision clean dry intact  Ext: No cords  Results from last 7 days   Lab Units 20  0603   HEMOGLOBIN g/dL 8.6*       IMP/DDX: Postop day #2 status post primary low transverse  section for failure to progress    Patient doing well.  Chronic hypertension well controlled without medications.  Blood sugar normal without glyburide.  Okay for discharge per patient request without medications for those problems.  Okay to DC with pain medicine, prenatal vitamins and Colace.  Continue oral iron.      Plan:    Patient given written instruction sheet.  Follow-up appointment with Dr Lynch in 2 weeks.    Murphy Wood MD  09:49 EST  2020    Electronically signed by Murphy Wood MD at 20 0951       Discharge Order (From admission, onward)     Start     Ordered    20 0946  Discharge patient  Once     Expected Discharge Date: 20    Expected Discharge Time: Midday    Discharge Disposition: Home or Self Care    Physician of Record for Attribution - Please select from Treatment Team: PRACHI LYNCH [[id number]]    Review needed by CMO to determine Physician of Record: No       Question Answer Comment   Physician of Record for Attribution - Please select from Treatment Team PRACHI LYNCH    Review needed by CMO to determine Physician of Record No         12/18/20 0949

## 2020-12-18 NOTE — DISCHARGE SUMMARY
Obstetrical Discharge Form    Primary OB Clinician: [facility]      Gestational Age: 39w1d    Antepartum complications: gestational diabetes and CHTN    Date of Delivery:  2020     Delivered By: Prachi Kim     Delivery Type: primary  section, low transverse incision    Tubal Ligation: n/a    Baby: Liveborn male, Apgars 9/9, weight 7 #, 13 oz    Anesthesia: epidural    Intrapartum complications: Failure to Progress    Laceration: n/a      Feeding method: breast      Discharge Date: 2020; Discharge Time: 09:49 EST    /73 (BP Location: Right arm, Patient Position: Sitting)   Pulse 86   Temp 98.3 °F (36.8 °C) (Oral)   Resp 18   LMP 2020 (Exact Date)   SpO2 98%   Breastfeeding Unknown      Abd: Soft, fundus firm, nontender.  Incision clean dry intact  Ext: No cords  Results from last 7 days   Lab Units 20  0603   HEMOGLOBIN g/dL 8.6*       IMP/DDX: Postop day #2 status post primary low transverse  section for failure to progress    Patient doing well.  Chronic hypertension well controlled without medications.  Blood sugar normal without glyburide.  Okay for discharge per patient request without medications for those problems.  Okay to DC with pain medicine, prenatal vitamins and Colace.  Continue oral iron.      Plan:    Patient given written instruction sheet.  Follow-up appointment with Dr Kim in 2 weeks.    Murphy Wood MD  09:49 EST  2020

## 2020-12-18 NOTE — PLAN OF CARE
Problem: Adult Inpatient Plan of Care  Goal: Plan of Care Review  Outcome: Met  Goal: Patient-Specific Goal (Individualized)  Outcome: Met  Goal: Absence of Hospital-Acquired Illness or Injury  Outcome: Met  Intervention: Identify and Manage Fall Risk  Recent Flowsheet Documentation  Taken 2020 0750 by Isatu Ac, RN  Safety Promotion/Fall Prevention: safety round/check completed  Goal: Optimal Comfort and Wellbeing  Outcome: Met  Intervention: Provide Person-Centered Care  Recent Flowsheet Documentation  Taken 2020 0750 by Isatu Ac, RN  Trust Relationship/Rapport:   care explained   questions encouraged   thoughts/feelings acknowledged   questions answered  Goal: Readiness for Transition of Care  Outcome: Met     Problem: Bleeding (Labor)  Goal: Hemostasis  Outcome: Met     Problem: Change in Fetal Wellbeing (Labor)  Goal: Stable Fetal Wellbeing  Outcome: Met     Problem: Delayed Labor Progression (Labor)  Goal: Effective Progression to Delivery  Outcome: Met     Problem: Infection (Labor)  Goal: Absence of Infection Signs and Symptoms  Outcome: Met     Problem: Labor Pain (Labor)  Goal: Acceptable Pain Control  Outcome: Met     Problem: Uterine Tachysystole (Labor)  Goal: Normal Uterine Contraction Pattern  Outcome: Met     Problem: Adjustment to Role Transition (Postpartum  Delivery)  Goal: Successful Maternal Role Transition  Outcome: Met     Problem: Bleeding (Postpartum  Delivery)  Goal: Hemostasis  Outcome: Met     Problem: Infection (Postpartum  Delivery)  Goal: Absence of Infection Signs and Symptoms  Outcome: Met     Problem: Pain (Postpartum  Delivery)  Goal: Acceptable Pain Control  Outcome: Met  Intervention: Prevent or Manage Pain  Recent Flowsheet Documentation  Taken 2020 0750 by Isatu Ac, RN  Pain Management Interventions: quiet environment facilitated     Problem: Postoperative Nausea and Vomiting (Postpartum   Delivery)  Goal: Nausea and Vomiting Relief  Outcome: Met     Problem: Postoperative Urinary Retention (Postpartum  Delivery)  Goal: Effective Urinary Elimination  Outcome: Met   Goal Outcome Evaluation:  Plan of Care Reviewed With: spouse, patient  Progress: improving

## 2020-12-20 NOTE — NURSING NOTE
Case Management Discharge Note      Final Note: Discharged home.         Selected Continued Care - Discharged on 12/18/2020 Admission date: 12/15/2020 - Discharge disposition: Home or Self Care    Destination    No services have been selected for the patient.              Durable Medical Equipment    No services have been selected for the patient.              Dialysis/Infusion    No services have been selected for the patient.              Home Medical Care    No services have been selected for the patient.              Therapy    No services have been selected for the patient.              Community Resources    No services have been selected for the patient.                       Final Discharge Disposition Code: 01 - home or self-care

## 2020-12-31 ENCOUNTER — HOSPITAL ENCOUNTER (EMERGENCY)
Facility: HOSPITAL | Age: 30
Discharge: HOME | End: 2020-12-31
Attending: EMERGENCY MEDICINE | Admitting: EMERGENCY MEDICINE

## 2020-12-31 ENCOUNTER — APPOINTMENT (OUTPATIENT)
Dept: CT IMAGING | Facility: HOSPITAL | Age: 30
End: 2020-12-31

## 2020-12-31 VITALS
DIASTOLIC BLOOD PRESSURE: 103 MMHG | BODY MASS INDEX: 28.17 KG/M2 | HEART RATE: 58 BPM | OXYGEN SATURATION: 98 % | WEIGHT: 190.2 LBS | TEMPERATURE: 98.2 F | SYSTOLIC BLOOD PRESSURE: 170 MMHG | HEIGHT: 69 IN | RESPIRATION RATE: 18 BRPM

## 2020-12-31 DIAGNOSIS — Z98.891 S/P CESAREAN SECTION: ICD-10-CM

## 2020-12-31 LAB
ALBUMIN SERPL-MCNC: 3.9 G/DL (ref 3.5–5.2)
ALBUMIN/GLOB SERPL: 1.5 G/DL
ALP SERPL-CCNC: 109 U/L (ref 39–117)
ALT SERPL W P-5'-P-CCNC: 9 U/L (ref 1–33)
ANION GAP SERPL CALCULATED.3IONS-SCNC: 10 MMOL/L (ref 5–15)
APTT PPP: 28 SECONDS (ref 24.3–38.1)
AST SERPL-CCNC: 13 U/L (ref 1–32)
BASOPHILS # BLD AUTO: 0.05 10*3/MM3 (ref 0–0.2)
BASOPHILS NFR BLD AUTO: 0.6 % (ref 0–1.5)
BILIRUB SERPL-MCNC: <0.2 MG/DL (ref 0–1.2)
BUN SERPL-MCNC: 14 MG/DL (ref 6–20)
BUN/CREAT SERPL: 16.5 (ref 7–25)
CALCIUM SPEC-SCNC: 8.7 MG/DL (ref 8.6–10.5)
CHLORIDE SERPL-SCNC: 103 MMOL/L (ref 98–107)
CO2 SERPL-SCNC: 24 MMOL/L (ref 22–29)
CREAT SERPL-MCNC: 0.85 MG/DL (ref 0.57–1)
D DIMER PPP FEU-MCNC: 1.84 MCGFEU/ML (ref 0–0.46)
DEPRECATED RDW RBC AUTO: 43.3 FL (ref 37–54)
EOSINOPHIL # BLD AUTO: 0.21 10*3/MM3 (ref 0–0.4)
EOSINOPHIL NFR BLD AUTO: 2.4 % (ref 0.3–6.2)
ERYTHROCYTE [DISTWIDTH] IN BLOOD BY AUTOMATED COUNT: 13.8 % (ref 12.3–15.4)
GFR SERPL CREATININE-BSD FRML MDRD: 79 ML/MIN/1.73
GLOBULIN UR ELPH-MCNC: 2.6 GM/DL
GLUCOSE SERPL-MCNC: 85 MG/DL (ref 65–99)
HCT VFR BLD AUTO: 35.3 % (ref 34–46.6)
HGB BLD-MCNC: 11 G/DL (ref 12–15.9)
IMM GRANULOCYTES # BLD AUTO: 0.02 10*3/MM3 (ref 0–0.05)
IMM GRANULOCYTES NFR BLD AUTO: 0.2 % (ref 0–0.5)
INR PPP: 1.09 (ref 0.9–1.1)
LYMPHOCYTES # BLD AUTO: 2.35 10*3/MM3 (ref 0.7–3.1)
LYMPHOCYTES NFR BLD AUTO: 27.1 % (ref 19.6–45.3)
MCH RBC QN AUTO: 27 PG (ref 26.6–33)
MCHC RBC AUTO-ENTMCNC: 31.2 G/DL (ref 31.5–35.7)
MCV RBC AUTO: 86.7 FL (ref 79–97)
MONOCYTES # BLD AUTO: 0.69 10*3/MM3 (ref 0.1–0.9)
MONOCYTES NFR BLD AUTO: 7.9 % (ref 5–12)
NEUTROPHILS NFR BLD AUTO: 5.36 10*3/MM3 (ref 1.7–7)
NEUTROPHILS NFR BLD AUTO: 61.8 % (ref 42.7–76)
NRBC BLD AUTO-RTO: 0 /100 WBC (ref 0–0.2)
NT-PROBNP SERPL-MCNC: 64.8 PG/ML (ref 0–450)
PLATELET # BLD AUTO: 489 10*3/MM3 (ref 140–450)
PMV BLD AUTO: 10 FL (ref 6–12)
POTASSIUM SERPL-SCNC: 3.7 MMOL/L (ref 3.5–5.2)
PROT SERPL-MCNC: 6.5 G/DL (ref 6–8.5)
PROTHROMBIN TIME: 13.8 SECONDS (ref 12.1–15)
RBC # BLD AUTO: 4.07 10*6/MM3 (ref 3.77–5.28)
SODIUM SERPL-SCNC: 137 MMOL/L (ref 136–145)
TROPONIN T SERPL-MCNC: <0.01 NG/ML (ref 0–0.03)
WBC # BLD AUTO: 8.68 10*3/MM3 (ref 3.4–10.8)

## 2020-12-31 PROCEDURE — 93010 ELECTROCARDIOGRAM REPORT: CPT | Performed by: INTERNAL MEDICINE

## 2020-12-31 PROCEDURE — 93005 ELECTROCARDIOGRAM TRACING: CPT | Performed by: EMERGENCY MEDICINE

## 2020-12-31 PROCEDURE — 93005 ELECTROCARDIOGRAM TRACING: CPT

## 2020-12-31 PROCEDURE — 99283 EMERGENCY DEPT VISIT LOW MDM: CPT | Performed by: PHYSICIAN ASSISTANT

## 2020-12-31 PROCEDURE — 83880 ASSAY OF NATRIURETIC PEPTIDE: CPT | Performed by: PHYSICIAN ASSISTANT

## 2020-12-31 PROCEDURE — 99284 EMERGENCY DEPT VISIT MOD MDM: CPT

## 2020-12-31 PROCEDURE — 85730 THROMBOPLASTIN TIME PARTIAL: CPT | Performed by: PHYSICIAN ASSISTANT

## 2020-12-31 PROCEDURE — 71275 CT ANGIOGRAPHY CHEST: CPT

## 2020-12-31 PROCEDURE — 80053 COMPREHEN METABOLIC PANEL: CPT | Performed by: PHYSICIAN ASSISTANT

## 2020-12-31 PROCEDURE — 85610 PROTHROMBIN TIME: CPT | Performed by: PHYSICIAN ASSISTANT

## 2020-12-31 PROCEDURE — 85379 FIBRIN DEGRADATION QUANT: CPT | Performed by: PHYSICIAN ASSISTANT

## 2020-12-31 PROCEDURE — 84484 ASSAY OF TROPONIN QUANT: CPT | Performed by: PHYSICIAN ASSISTANT

## 2020-12-31 PROCEDURE — 0 IOPAMIDOL PER 1 ML: Performed by: EMERGENCY MEDICINE

## 2020-12-31 PROCEDURE — 85025 COMPLETE CBC W/AUTO DIFF WBC: CPT | Performed by: PHYSICIAN ASSISTANT

## 2020-12-31 RX ORDER — SODIUM CHLORIDE 0.9 % (FLUSH) 0.9 %
10 SYRINGE (ML) INJECTION AS NEEDED
Status: DISCONTINUED | OUTPATIENT
Start: 2020-12-31 | End: 2021-01-01 | Stop reason: HOSPADM

## 2020-12-31 RX ADMIN — IOPAMIDOL 100 ML: 755 INJECTION, SOLUTION INTRAVENOUS at 20:44

## 2020-12-31 RX ADMIN — APIXABAN 10 MG: 2.5 TABLET, FILM COATED ORAL at 22:11

## 2021-01-01 ENCOUNTER — HOSPITAL ENCOUNTER (EMERGENCY)
Facility: HOSPITAL | Age: 31
Discharge: HOME | End: 2021-01-02
Attending: EMERGENCY MEDICINE | Admitting: EMERGENCY MEDICINE

## 2021-01-01 VITALS
DIASTOLIC BLOOD PRESSURE: 100 MMHG | SYSTOLIC BLOOD PRESSURE: 170 MMHG | OXYGEN SATURATION: 98 % | TEMPERATURE: 98.9 F | RESPIRATION RATE: 18 BRPM | HEIGHT: 69 IN | BODY MASS INDEX: 30.56 KG/M2 | WEIGHT: 206.3 LBS | HEART RATE: 74 BPM

## 2021-01-01 DIAGNOSIS — R07.81 PLEURITIC CHEST PAIN: Primary | ICD-10-CM

## 2021-01-01 DIAGNOSIS — I26.99 OTHER ACUTE PULMONARY EMBOLISM WITHOUT ACUTE COR PULMONALE (HCC): ICD-10-CM

## 2021-01-01 PROCEDURE — 99283 EMERGENCY DEPT VISIT LOW MDM: CPT

## 2021-01-01 NOTE — ED PROVIDER NOTES
EMERGENCY DEPARTMENT ENCOUNTER      Room Number:     History is provided by the patient, no translation services needed    HPI:    Chief complaint: Chest pain    Location: Substernal radiating to left chest    Quality/Severity: Pressure, currently 4/10    Timing/Duration: Pain began around 5 PM this evening.  Intermittent, however constant for the past hour.    Modifying Factors: Nothing really seems to make the pain better or worse.  Her  called EMS when chest pain began, they came and evaluated her and she felt better so she decided to stay home.  She states she took a Carafate which did not seem to help, then her chest pain worsened exponentially so she called EMS and was then transported here by ambulance.    Associated Symptoms: Positive for chest pain and shortness of breath when her chest pain was severe.  She denies any dizziness, diaphoresis, nausea, vomiting, palpitations, leg pain or swelling.    Narrative: Pt is a 30 y.o. female who presents complaining of chest pain that began around 5 PM this evening that radiates to the left chest.  Patient is G1, P1, 2 weeks S/P  section which was performed because labor was not progressing.  Denies any history of preeclampsia, chart review shows that she did have some hypertension and gestational diabetes with this pregnancy.  Patient states this evening around 5 PM she began having substernal chest pressure radiating to the left chest.  He states it was intermittent for the first hour but has been constant for the past hour.  She states with the initial onset she did have some shortness of air but denies any at this time.  She denies any leg pain or swelling.  She is no longer breast-feeding.  She denies any dizziness or syncope.  She is still experiencing some vaginal bleeding but denies any heavy bleeding at this time.  She does seem anxious but states she is just worried about the pain, and would also like to be with her baby at this  time.      PMD: Rosetta Adams MD    REVIEW OF SYSTEMS  Review of Systems   Constitutional: Negative for chills and fever.   Respiratory: Positive for shortness of breath. Negative for cough and wheezing.    Cardiovascular: Positive for chest pain. Negative for palpitations and leg swelling.   Gastrointestinal: Negative for abdominal pain, nausea and vomiting.   Genitourinary: Negative for difficulty urinating and dysuria.   Musculoskeletal: Negative for arthralgias and myalgias.   Skin: Positive for wound (From recent  section). Negative for rash.   Neurological: Negative for dizziness and syncope.   Psychiatric/Behavioral: Negative for confusion. The patient is nervous/anxious.          PAST MEDICAL HISTORY  Active Ambulatory Problems     Diagnosis Date Noted   • Pregnancy 2020   • CHTN- labetalol 50 mg QD, baseline 24 hour urine- 80 mg 2020   • Rubella non-immune status, antepartum- MMR pp 2020   • PCOS (polycystic ovarian syndrome) 2020   • Elevated glycosylated hemoglobin- HgBA1c= 5.7, needs early 2 hour GTT at 20  & 28 weeks 2020   • Gestational diabetes mellitus, class A2: glyburide 2020   • Solitary cyst of right breast: 5.7cm. Scheduled for drainage 2020   • Gastroesophageal reflux disease with esophagitis 2020   • Sacroiliac pain during pregnancy 10/16/2020   • Normal labor and delivery 12/15/2020   • S/P  section 2020     Resolved Ambulatory Problems     Diagnosis Date Noted   • Ectopic pregnancy 2020   • S/P laparoscopy 2020   • Pelvic pain 2020   • Blood type, Rh positive 2020   • Gestational diabetes 10/10/2020     Past Medical History:   Diagnosis Date   • Anemia    • Asthma    • Female infertility    • Headache    • Hypertension    • Polycystic ovary syndrome        PAST SURGICAL HISTORY  Past Surgical History:   Procedure Laterality Date   • ADENOIDECTOMY     •  SECTION N/A 2020     Procedure:  SECTION PRIMARY;  Surgeon: Prachi Kim DO;  Location: [facility] LABOR DELIVERY;  Service: Obstetrics/Gynecology;  Laterality: N/A;   • DIAGNOSTIC LAPAROSCOPY N/A 2020    Procedure: DIAGNOSTIC LAPAROSCOPY;  Surgeon: Imelda Rayo MD;  Location: [facility] OR;  Service: Obstetrics/Gynecology;  Laterality: N/A;   • TONSILLECTOMY  2008    adnoids also   • US GUIDED CYST ASPIRATION BREAST N/A 2020       FAMILY HISTORY  Family History   Problem Relation Age of Onset   • Hypertension Mother    • Cancer Mother    • Hypertension Maternal Grandmother    • Diabetes Maternal Grandfather    • Hypertension Paternal Grandmother    • Diabetes Paternal Grandfather    • Deep vein thrombosis Maternal Aunt    • Breast cancer Neg Hx    • Colon cancer Neg Hx    • Pulmonary embolism Neg Hx    • Anesthesia problems Neg Hx        SOCIAL HISTORY  Social History     Socioeconomic History   • Marital status:      Spouse name: Not on file   • Number of children: Not on file   • Years of education: Not on file   • Highest education level: Not on file   Occupational History   • Occupation: cna     Employer: [employer]   Social Needs   • Financial resource strain: Not hard at all   • Food insecurity     Worry: Never true     Inability: Never true   • Transportation needs     Medical: No     Non-medical: No   Tobacco Use   • Smoking status: Current Every Day Smoker     Packs/day: 0.25     Types: Cigarettes     Last attempt to quit: 2020     Years since quittin.5   • Smokeless tobacco: Never Used   Substance and Sexual Activity   • Alcohol use: Not Currently   • Drug use: No   • Sexual activity: Yes     Partners: Male     Birth control/protection: None   Lifestyle   • Physical activity     Days per week: 4 days     Minutes per session: 60 min   • Stress: Not at all       ALLERGIES  Patient has no known allergies.      Current Facility-Administered Medications:   •  [COMPLETED] Insert  peripheral IV, , , Once **AND** sodium chloride 0.9 % flush 10 mL, 10 mL, Intravenous, PRN, Mary Dorantes PA-C    Current Outpatient Medications:   •  ferrous gluconate (FERGON) 324 MG tablet, Take 1 tablet by mouth Daily With Breakfast. (Patient taking differently: Take 324 mg by mouth 3 (Three) Times a Day.), Disp: 30 tablet, Rfl: 6  •  sucralfate (CARAFATE) 1 g tablet, Take 1 tablet by mouth 4 (Four) Times a Day. Dissolve in 10 cc water, Disp: 120 tablet, Rfl: 5  •  Apixaban Starter Pack tablet therapy pack, Take two 5 mg tablets by mouth every 12 hours for 7 days. Followed by one 5 mg tablet every 12 hours. (Dispense starter pack if available), Disp: 74 tablet, Rfl: 0  •  Blood Glucose Monitoring Suppl (Accu-Chek Ana Plus) w/Device kit, CHECK BLOOD SUGAR 4 TIMES DAILY, Disp: , Rfl:   •  docusate sodium 100 MG capsule, Take 1 capsule by mouth 2 (Two) Times a Day As Needed for Constipation., Disp: 30 capsule, Rfl: 0  •  glucose monitor monitoring kit, Check blood sugar 4 times daily, Disp: 1 each, Rfl: 1  •  Lancets misc, Check blood sugar 4 times daily, Disp: 120 each, Rfl: 6  •  pantoprazole (PROTONIX) 40 MG EC tablet, Take 1 tablet by mouth Daily., Disp: 30 tablet, Rfl: 6  •  Prenat-Fe Carbonyl-FA-Omega 3 (ONE-A-DAY WOMENS PRENATAL 1) 28-0.8-235 MG capsule, Take 1 capsule by mouth Daily., Disp: , Rfl:     PHYSICAL EXAM  ED Triage Vitals [12/31/20 1812]   Temp Heart Rate Resp BP SpO2   98.2 °F (36.8 °C) 61 22 (!) 148/106 98 %      Temp src Heart Rate Source Patient Position BP Location FiO2 (%)   Oral Monitor Sitting Left arm --       Physical Exam   Constitutional: She is oriented to person, place, and time and well-developed, well-nourished, and in no distress.   HENT:   Head: Normocephalic and atraumatic.   Eyes: Pupils are equal, round, and reactive to light. Conjunctivae are normal.   Neck: Normal range of motion. Neck supple. No JVD present.   Cardiovascular: Normal rate, regular rhythm, normal  heart sounds and intact distal pulses.   No murmur heard.  Pulmonary/Chest: Effort normal and breath sounds normal. She has no wheezes. She has no rales.   Abdominal: Soft. Bowel sounds are normal. There is no abdominal tenderness. There is no guarding.   Musculoskeletal: Normal range of motion.         General: No edema.   Neurological: She is alert and oriented to person, place, and time. GCS score is 15.   Skin: Skin is warm and dry. No erythema.   Patient  incision is healing well without sign of infection   Psychiatric: Memory, affect and judgment normal. Her mood appears anxious. She does not exhibit a depressed mood. She expresses no suicidal ideation.   Patient appears tearful and anxious, but with normal affect.   Nursing note and vitals reviewed.        LAB RESULTS  Lab Results (last 24 hours)     Procedure Component Value Units Date/Time    CBC & Differential [[id number]]  (Abnormal) Collected: 20    Specimen: Blood Updated: 20    Narrative:      The following orders were created for panel order CBC & Differential.  Procedure                               Abnormality         Status                     ---------                               -----------         ------                     CBC Auto Differential[[id number]]        Abnormal            Final result                 Please view results for these tests on the individual orders.    Comprehensive Metabolic Panel [[id number]] Collected: 20    Specimen: Blood Updated: 20     Glucose 85 mg/dL      BUN 14 mg/dL      Creatinine 0.85 mg/dL      Sodium 137 mmol/L      Potassium 3.7 mmol/L      Chloride 103 mmol/L      CO2 24.0 mmol/L      Calcium 8.7 mg/dL      Total Protein 6.5 g/dL      Albumin 3.90 g/dL      ALT (SGPT) 9 U/L      AST (SGOT) 13 U/L      Alkaline Phosphatase 109 U/L      Total Bilirubin <0.2 mg/dL      eGFR Non African Amer 79 mL/min/1.73      Globulin 2.6 gm/dL      A/G Ratio 1.5 g/dL       BUN/Creatinine Ratio 16.5     Anion Gap 10.0 mmol/L     Narrative:      GFR Normal >60  Chronic Kidney Disease <60  Kidney Failure <15      Troponin [[id number]]  (Normal) Collected: 12/31/20 1917    Specimen: Blood Updated: 12/31/20 1945     Troponin T <0.010 ng/mL     Narrative:      Troponin T Reference Range:  <= 0.03 ng/mL-   Negative for AMI  >0.03 ng/mL-     Abnormal for myocardial necrosis.  Clinicians would have to utilize clinical acumen, EKG, Troponin and serial changes to determine if it is an Acute Myocardial Infarction or myocardial injury due to an underlying chronic condition.       Results may be falsely decreased if patient taking Biotin.      D-dimer, Quantitative [[id number]]  (Abnormal) Collected: 12/31/20 1917    Specimen: Blood Updated: 12/31/20 1935     D-Dimer, Quantitative 1.84 MCGFEU/mL     Narrative:      Can be elevated in, but is not diagnostic for deep vein thrombosis (DVT) or pulmonary embolis (PE).  It is also elevated in other medical conditions.  Clinical correlation is required.  The negative cut-off value for the D-Dimer is 0.50 mcg FEU/mL for DVT and PE.      BNP [[id number]]  (Normal) Collected: 12/31/20 1917    Specimen: Blood Updated: 12/31/20 1958     proBNP 64.8 pg/mL     Narrative:      Among patients with dyspnea, NT-proBNP is highly sensitive for the detection of acute congestive heart failure. In addition NT-proBNP of <300 pg/ml effectively rules out acute congestive heart failure with 99% negative predictive value.    Results may be falsely decreased if patient taking Biotin.      CBC Auto Differential [[id number]]  (Abnormal) Collected: 12/31/20 1917    Specimen: Blood Updated: 12/31/20 1941     WBC 8.68 10*3/mm3      RBC 4.07 10*6/mm3      Hemoglobin 11.0 g/dL      Hematocrit 35.3 %      MCV 86.7 fL      MCH 27.0 pg      MCHC 31.2 g/dL      RDW 13.8 %      RDW-SD 43.3 fl      MPV 10.0 fL      Platelets 489 10*3/mm3      Neutrophil % 61.8 %      Lymphocyte % 27.1 %       Monocyte % 7.9 %      Eosinophil % 2.4 %      Basophil % 0.6 %      Immature Grans % 0.2 %      Neutrophils, Absolute 5.36 10*3/mm3      Lymphocytes, Absolute 2.35 10*3/mm3      Monocytes, Absolute 0.69 10*3/mm3      Eosinophils, Absolute 0.21 10*3/mm3      Basophils, Absolute 0.05 10*3/mm3      Immature Grans, Absolute 0.02 10*3/mm3      nRBC 0.0 /100 WBC     aPTT [[id number]]  (Normal) Collected: 12/31/20 1917    Specimen: Blood Updated: 12/31/20 2140     PTT 28.0 seconds     Narrative:      PTT = The equivalent PTT values for the therapeutic range of heparin levels at 0.1 to 0.7 U/ml are 53 to 110 seconds.      Protime-INR [[id number]]  (Normal) Collected: 12/31/20 1917    Specimen: Blood Updated: 12/31/20 2140     Protime 13.8 Seconds      INR 1.09    Narrative:      Therapeutic Ranges for INR: 2.0-3.0 (PT 20-30)                              2.5-3.5 (PT 25-34)            I ordered the above labs and reviewed the results    RADIOLOGY  Ct Angiogram Chest    Result Date: 12/31/2020  CTA Chest INDICATION: Recent postpartum with sudden onset left chest pain and elevated d-dimer, concern for pulmonary TECHNIQUE: CT angiogram of the chest with IV contrast. 3-D reconstructions were obtained and reviewed.   Radiation dose reduction techniques included automated exposure control or exposure modulation based on body size. Count of known CT and cardiac nuc med studies performed in previous 12 months: 0. COMPARISON: None available. FINDINGS: Pulmonary arterial opacification is only good centrally with diminished opacification of the segmental branches and subsegmental branches of the pulmonary arterial vasculature.. However, there is patchy somewhat irregular opacification of the segmental and subsegmental branches within the left lower lobe worrisome for small pulmonary emboli though not definitive. Additionally, there are peripheral very small wedge-shaped opacities abutting the pleural surface which could represent early  changes associated with pulmonary embolus. No confluent infiltrate in the lungs otherwise. No pleural effusion. No pneumothorax. Thoracic aorta normal in course and caliber without dissection. Heart size is normal. No pericardial effusion. Aberrant right subclavian artery following retropharyngeal course. The RV/LV ratio is not elevated. Visualized upper abdomen appears within normal limits allowing for limited field-of-view and contrast bolus timing.  IMPRESSION: 1.  Poor opacification of the pulmonary arteries at the segmental and subsegmental level. Allowing for this there is somewhat irregular opacification within the segmental and subsegmental branches of the left lower lobe pulmonary arterial vasculature. No definite focal filling defect is seen but there are some peripherally based small wedge-shaped opacities within the left lower lobe of the lung. The combination of findings are worrisome for small subsegmental or segmental pulmonary emboli though not definitive. 2.  Aberrant right subclavian artery following a retropharyngeal course. I discussed the above findings of potential segmental/subsegmental pulmonary embolus with the patient's ordering provider Mary Dorantes via telephone conversation at 9:00 PM on 12/31/2020. Signer Name: JEAN PIERRE ALICEA MD  Signed: 12/31/2020 9:05 PM  Workstation Name: [facility]  [facility]      I ordered the above radiologic testing and reviewed the results    PROCEDURES  Procedures      PROGRESS AND CONSULTS  ED Course as of Dec 31 2253   Thu Dec 31, 2020   1815 EKG         EKG time / Interpretation time: 1802/1805  Rhythm/Rate: Sinus bradycardia, 57   MI: 120  QRS, axis: 22  QTc 407  ST and T waves: There is no ST elevation or depression, no T wave inversions.  EKG Tracing Interpreted Contemporaneously by me, independently viewed by me and MD.  No significant change from prior EKG on 8/4/2020 except today's rate is slightly slower at 57.      [KS]    2226 Discussed patient with radiologist, Dr. Branch.  He called with emergent results regarding patient's CT angiogram of the chest.  He states that the study was not quite definitive given the timing of the contrast, however there is enough evidence that he is concerned for small subsegmental pulmonary embolism in the left lower lobe.  I spoke with NORMAN Chamorro on-call for Dr. Marcos this evening, she spoke with Dr. Marcos regarding patient's care and they both agree that there is no contraindication to start her on Eliquis at this time.  Patient's vitals are normal with the exception of fluctuating blood pressures while in the department, her BNP is normal, her troponin is normal, and there is no evidence of right heart strain on CT angiogram.  I have discussed her case with Dr. Corrigan as well and he agrees there is no indication for admission at this time.  We will start patient on Eliquis here with 10 mg twice daily dosing for 1 week, followed by 5 mg twice daily dosing.  OB/GYN did recommend that patient follow with hematology as well so we will give her the number for the CBC group, I have also instructed her to follow-up with her PCP for further evaluation and management of this probable PE.  I have also discussed with patient that she should return to the emergency department with any worsening or emergent symptoms of any kind.  Patient verbalizes understanding and is agreeable with this plan.    [KS]      ED Course User Index  [KS] Mary Dorantes PA-C           MEDICAL DECISION MAKING  Results were reviewed/discussed with the patient and they were also made aware of online access. Pt also made aware that some labs, such as cultures, will not be resulted during ER visit and follow up with PMD is necessary.     MDM        My differential diagnosis for chest pain includes but is not limited to:  Muscle strain, costochondritis, myositis, pleurisy, rib fracture, intercostal neuritis, herpes  zoster, tumor, myocardial infarction, coronary syndrome, unstable angina, angina, aortic dissection, mitral valve prolapse, pericarditis, palpitations, pulmonary embolus, pneumonia, pneumothorax, lung cancer, GERD, esophagitis, esophageal spasm      DIAGNOSIS  Final diagnoses:   Postpartum pulmonary embolism   S/P  section       Latest Documented Vital Signs:  As of 22:53 EST  BP- (!) 170/103 HR- 58 Temp- 98.2 °F (36.8 °C) (Oral) O2 sat- 98%    DISPOSITION  Patient discharged home in care of significant other.    Discussed pertinent labs and imaging findings with the patient/family.  Patient/Family voiced understanding of need to follow-up for recheck, further testing as needed.  Return to the emergency Department warnings were given.         Medication List      New Prescriptions    Apixaban Starter Pack tablet therapy pack  Take two 5 mg tablets by mouth every 12 hours for 7 days. Followed by one 5 mg tablet every 12 hours. (Dispense starter pack if available)        Changed    ferrous gluconate 324 MG tablet  Commonly known as: FERGON  Take 1 tablet by mouth Daily With Breakfast.  What changed: when to take this           Where to Get Your Medications      These medications were sent to [facility]/pharmacy #[id number] - [place] [id number] [facility] - [phone]  - [phone] [place] [address]    Phone: [phone]   · Apixaban Starter Pack tablet therapy pack             Follow-up Information     Prachi Kim DO. Go on 2021.    Specialties: Obstetrics and Gynecology, Gynecology  Why: As previously scheduled  Contact information:  [address]  [facility] [address]  [place] [address] [phone]             oRsetta Adams MD. Call on 2021.    Specialty: Family Medicine  Why: To schedule follow-up appointment  Contact information:  [address] [facility] [address] [phone]             Morro Rich MD. Call on 2021.    Specialties: Hematology  and Oncology, Oncology, Hematology  Why: To schedule follow-up appointment  Contact information:  [address]  [address]  [place] [address]  [phone]                     Dictated utilizing Dragon dictation     Mary Dorantes PA-C  12/31/20 2232       Mary Dorantes PA-C  12/31/20 8071

## 2021-01-01 NOTE — DISCHARGE INSTRUCTIONS
Please take the Eliquis as directed.  Do not take any naproxen, ibuprofen, or aspirin while you are on this medication.  You should also avoid drinking alcohol while on this medication.  If your bleeding worsens to the point that you are soaking through a pad per hour, or start passing large blood clots please contact your OB/GYN immediately.    Follow-up with your OB/GYN for postop appointment, please see your primary care provider next week for ER follow-up and continued management of likely PE.  I have also provided the name of Dr. Rich who is a hematologist, it has been recommended by OB/GYN that you follow-up with hematology as well.     Please return to the emergency department with any new or worsening symptoms.

## 2021-01-02 PROCEDURE — 99284 EMERGENCY DEPT VISIT MOD MDM: CPT | Performed by: EMERGENCY MEDICINE

## 2021-01-02 RX ORDER — OXYCODONE HYDROCHLORIDE AND ACETAMINOPHEN 5; 325 MG/1; MG/1
1 TABLET ORAL EVERY 6 HOURS PRN
Qty: 12 TABLET | Refills: 0 | Status: SHIPPED | OUTPATIENT
Start: 2021-01-02 | End: 2021-01-07

## 2021-01-02 RX ORDER — OXYCODONE HYDROCHLORIDE AND ACETAMINOPHEN 5; 325 MG/1; MG/1
1 TABLET ORAL ONCE
Status: COMPLETED | OUTPATIENT
Start: 2021-01-02 | End: 2021-01-02

## 2021-01-02 RX ADMIN — OXYCODONE HYDROCHLORIDE AND ACETAMINOPHEN 1 TABLET: 5; 325 TABLET ORAL at 00:26

## 2021-01-02 NOTE — ED PROVIDER NOTES
EMERGENCY DEPARTMENT ENCOUNTER      Room Number:       HPI:    Chief complaint: Left-sided chest pain    Location: Left, mid, anterior chest with radiation to the back    Quality/Severity: Moderate    Timing/Duration: Symptoms started yesterday    Modifying Factors: Patient seen in our department yesterday and diagnosed with a left lower lobe pulmonary embolus    Associated Symptoms: Fatigue and vague shortness of breath    Narrative: Pt is a 30 y.o. female who presents complaining of left-sided pleuritic chest pain as noted above.  Again, patient was seen in our department yesterday and diagnosed with a small pulmonary embolus.  Patient has been started on Eliquis as directed.  Patient here now because of continued left chest pain.  No worsening shortness of breath and the patient denies any hemoptysis.      PMD: Rosetta Adams MD    REVIEW OF SYSTEMS  Review of Systems   Constitutional: Positive for activity change and fatigue. Negative for fever.   HENT: Negative for congestion, rhinorrhea and sore throat.    Respiratory: Positive for shortness of breath. Negative for cough and wheezing.    Cardiovascular: Positive for chest pain. Negative for leg swelling.   Gastrointestinal: Positive for abdominal pain (From recent  section).   Musculoskeletal: Positive for back pain (Radiating from left chest).   All other systems reviewed and are negative.      PAST MEDICAL HISTORY  Active Ambulatory Problems     Diagnosis Date Noted   • Pregnancy 2020   • CHTN- labetalol 50 mg QD, baseline 24 hour urine- 80 mg 2020   • Rubella non-immune status, antepartum- MMR pp 2020   • PCOS (polycystic ovarian syndrome) 2020   • Elevated glycosylated hemoglobin- HgBA1c= 5.7, needs early 2 hour GTT at 20  & 28 weeks 2020   • Gestational diabetes mellitus, class A2: glyburide 2020   • Solitary cyst of right breast: 5.7cm. Scheduled for drainage 2020   • Gastroesophageal reflux  disease with esophagitis 2020   • Sacroiliac pain during pregnancy 10/16/2020   • Normal labor and delivery 12/15/2020   • S/P  section 2020     Resolved Ambulatory Problems     Diagnosis Date Noted   • Ectopic pregnancy 2020   • S/P laparoscopy 2020   • Pelvic pain 2020   • Blood type, Rh positive 2020   • Gestational diabetes 10/10/2020     Past Medical History:   Diagnosis Date   • Anemia    • Asthma    • Female infertility    • Headache    • Hypertension    • Polycystic ovary syndrome        PAST SURGICAL HISTORY  Past Surgical History:   Procedure Laterality Date   • ADENOIDECTOMY     •  SECTION N/A 2020    Procedure:  SECTION PRIMARY;  Surgeon: Prachi Kim DO;  Location: [facility] LABOR DELIVERY;  Service: Obstetrics/Gynecology;  Laterality: N/A;   • DIAGNOSTIC LAPAROSCOPY N/A 2020    Procedure: DIAGNOSTIC LAPAROSCOPY;  Surgeon: Imelda Rayo MD;  Location: [facility] OR;  Service: Obstetrics/Gynecology;  Laterality: N/A;   • TONSILLECTOMY  2008    adnoids also   • US GUIDED CYST ASPIRATION BREAST N/A 2020       FAMILY HISTORY  Family History   Problem Relation Age of Onset   • Hypertension Mother    • Cancer Mother    • Hypertension Maternal Grandmother    • Diabetes Maternal Grandfather    • Hypertension Paternal Grandmother    • Diabetes Paternal Grandfather    • Deep vein thrombosis Maternal Aunt    • Breast cancer Neg Hx    • Colon cancer Neg Hx    • Pulmonary embolism Neg Hx    • Anesthesia problems Neg Hx        SOCIAL HISTORY  Social History     Socioeconomic History   • Marital status:      Spouse name: Not on file   • Number of children: Not on file   • Years of education: Not on file   • Highest education level: Not on file   Occupational History   • Occupation: cna     Employer: [employer]   Social Needs   • Financial resource strain: Not hard at all   • Food insecurity     Worry: Never true      Inability: Never true   • Transportation needs     Medical: No     Non-medical: No   Tobacco Use   • Smoking status: Current Every Day Smoker     Packs/day: 0.25     Types: Cigarettes     Last attempt to quit: 2020     Years since quittin.5   • Smokeless tobacco: Never Used   Substance and Sexual Activity   • Alcohol use: Not Currently   • Drug use: No   • Sexual activity: Yes     Partners: Male     Birth control/protection: None   Lifestyle   • Physical activity     Days per week: 4 days     Minutes per session: 60 min   • Stress: Not at all       ALLERGIES  Patient has no known allergies.    PHYSICAL EXAM  ED Triage Vitals [21 2355]   Temp Heart Rate Resp BP SpO2   98.9 °F (37.2 °C) 74 18 170/100 98 %      Temp src Heart Rate Source Patient Position BP Location FiO2 (%)   Oral -- -- -- --       Physical Exam   Constitutional: She is oriented to person, place, and time.   The patient is an obese, 30-year-old, female no acute distress.   HENT:   Head: Normocephalic and atraumatic.   Eyes: Conjunctivae and EOM are normal.   Neck: Normal range of motion.   Cardiovascular: Normal rate, regular rhythm and normal heart sounds.   Pulmonary/Chest: Effort normal and breath sounds normal. No respiratory distress.   Neurological: She is alert and oriented to person, place, and time.   Skin: Skin is warm and dry.   Psychiatric: Affect and judgment normal.       LAB RESULTS  Results for orders placed or performed during the hospital encounter of 20   Comprehensive Metabolic Panel    Specimen: Blood   Result Value Ref Range    Glucose 85 65 - 99 mg/dL    BUN 14 6 - 20 mg/dL    Creatinine 0.85 0.57 - 1.00 mg/dL    Sodium 137 136 - 145 mmol/L    Potassium 3.7 3.5 - 5.2 mmol/L    Chloride 103 98 - 107 mmol/L    CO2 24.0 22.0 - 29.0 mmol/L    Calcium 8.7 8.6 - 10.5 mg/dL    Total Protein 6.5 6.0 - 8.5 g/dL    Albumin 3.90 3.50 - 5.20 g/dL    ALT (SGPT) 9 1 - 33 U/L    AST (SGOT) 13 1 - 32 U/L    Alkaline  Phosphatase 109 39 - 117 U/L    Total Bilirubin <0.2 0.0 - 1.2 mg/dL    eGFR Non African Amer 79 >60 mL/min/1.73    Globulin 2.6 gm/dL    A/G Ratio 1.5 g/dL    BUN/Creatinine Ratio 16.5 7.0 - 25.0    Anion Gap 10.0 5.0 - 15.0 mmol/L   Troponin    Specimen: Blood   Result Value Ref Range    Troponin T <0.010 0.000 - 0.030 ng/mL   D-dimer, Quantitative    Specimen: Blood   Result Value Ref Range    D-Dimer, Quantitative 1.84 (H) 0.00 - 0.46 MCGFEU/mL   BNP    Specimen: Blood   Result Value Ref Range    proBNP 64.8 0.0 - 450.0 pg/mL   CBC Auto Differential    Specimen: Blood   Result Value Ref Range    WBC 8.68 3.40 - 10.80 10*3/mm3    RBC 4.07 3.77 - 5.28 10*6/mm3    Hemoglobin 11.0 (L) 12.0 - 15.9 g/dL    Hematocrit 35.3 34.0 - 46.6 %    MCV 86.7 79.0 - 97.0 fL    MCH 27.0 26.6 - 33.0 pg    MCHC 31.2 (L) 31.5 - 35.7 g/dL    RDW 13.8 12.3 - 15.4 %    RDW-SD 43.3 37.0 - 54.0 fl    MPV 10.0 6.0 - 12.0 fL    Platelets 489 (H) 140 - 450 10*3/mm3    Neutrophil % 61.8 42.7 - 76.0 %    Lymphocyte % 27.1 19.6 - 45.3 %    Monocyte % 7.9 5.0 - 12.0 %    Eosinophil % 2.4 0.3 - 6.2 %    Basophil % 0.6 0.0 - 1.5 %    Immature Grans % 0.2 0.0 - 0.5 %    Neutrophils, Absolute 5.36 1.70 - 7.00 10*3/mm3    Lymphocytes, Absolute 2.35 0.70 - 3.10 10*3/mm3    Monocytes, Absolute 0.69 0.10 - 0.90 10*3/mm3    Eosinophils, Absolute 0.21 0.00 - 0.40 10*3/mm3    Basophils, Absolute 0.05 0.00 - 0.20 10*3/mm3    Immature Grans, Absolute 0.02 0.00 - 0.05 10*3/mm3    nRBC 0.0 0.0 - 0.2 /100 WBC   aPTT    Specimen: Blood   Result Value Ref Range    PTT 28.0 24.3 - 38.1 seconds   Protime-INR    Specimen: Blood   Result Value Ref Range    Protime 13.8 12.1 - 15.0 Seconds    INR 1.09 0.90 - 1.10   ECG 12 Lead   Result Value Ref Range    QT Interval 417 ms         I ordered the above labs and reviewed the results    RADIOLOGY  Us Fetal Biophysical Profile;with Non-stress Testing    Result Date: 12/18/2020  Narrative: BPP 8/8. JULIAN 11cm     Us Fetal  Biophysical Profile;with Non-stress Testing    Result Date: 12/10/2020  Narrative: BPP 8/8 Vertex JULIAN 15cm    Ct Angiogram Chest    Result Date: 12/31/2020  Narrative: CTA Chest INDICATION: Recent postpartum with sudden onset left chest pain and elevated d-dimer, concern for pulmonary TECHNIQUE: CT angiogram of the chest with IV contrast. 3-D reconstructions were obtained and reviewed.   Radiation dose reduction techniques included automated exposure control or exposure modulation based on body size. Count of known CT and cardiac nuc med studies performed in previous 12 months: 0. COMPARISON: None available. FINDINGS: Pulmonary arterial opacification is only good centrally with diminished opacification of the segmental branches and subsegmental branches of the pulmonary arterial vasculature.. However, there is patchy somewhat irregular opacification of the segmental and subsegmental branches within the left lower lobe worrisome for small pulmonary emboli though not definitive. Additionally, there are peripheral very small wedge-shaped opacities abutting the pleural surface which could represent early changes associated with pulmonary embolus. No confluent infiltrate in the lungs otherwise. No pleural effusion. No pneumothorax. Thoracic aorta normal in course and caliber without dissection. Heart size is normal. No pericardial effusion. Aberrant right subclavian artery following retropharyngeal course. The RV/LV ratio is not elevated. Visualized upper abdomen appears within normal limits allowing for limited field-of-view and contrast bolus timing.  IMPRESSION: 1.  Poor opacification of the pulmonary arteries at the segmental and subsegmental level. Allowing for this there is somewhat irregular opacification within the segmental and subsegmental branches of the left lower lobe pulmonary arterial vasculature. No definite focal filling defect is seen but there are some peripherally based small wedge-shaped opacities  within the left lower lobe of the lung. The combination of findings are worrisome for small subsegmental or segmental pulmonary emboli though not definitive. 2.  Aberrant right subclavian artery following a retropharyngeal course. I discussed the above findings of potential segmental/subsegmental pulmonary embolus with the patient's ordering provider Mary Dorantes via telephone conversation at 9:00 PM on 12/31/2020. Signer Name: JEAN PIERRE ALICEA MD  Signed: 12/31/2020 9:05 PM  Workstation [facility]: [facility]  [facility]      I ordered the above radiologic testing and reviewed the results    PROCEDURES  Procedures      PROGRESS AND CONSULTS  ED Course as of Jan 02 0046   Sat Jan 02, 2021   0044 It was explained to the patient that pleuritic chest pain was to be expected with her current pulmonary embolus.  Patient was reassured that all her vital signs were completely normal and that her risk of worsening was low.  A short course of Percocet will be prescribed and warnings thoroughly discussed.    [ML]      ED Course User Index  [ML] Stuart Worrell MD           MEDICAL DECISION MAKING  Results were reviewed/discussed with the patient and they were also made aware of online access. Pt also made aware that some labs, such as cultures, will not be resulted during ER visit and follow up with PMD is necessary.     MDM       DIAGNOSIS  Final diagnoses:   Pleuritic chest pain   Other acute pulmonary embolism without acute cor pulmonale (CMS/HCC)       Latest Documented Vital Signs:  As of 00:46 EST  BP- 170/100 HR- 74 Temp- 98.9 °F (37.2 °C) (Oral) O2 sat- 98%    DISPOSITION  Discharged in good condition       Medication List      New Prescriptions    oxyCODONE-acetaminophen 5-325 MG per tablet  Commonly known as: Percocet  Take 1 tablet by mouth Every 6 (Six) Hours As Needed for Moderate Pain .        Changed    ferrous gluconate 324 MG tablet  Commonly known as: FERGON  Take 1 tablet by mouth  Daily With Breakfast.  What changed: when to take this           Where to Get Your Medications      These medications were sent to [facility]/pharmacy #[id number] - [place], [place] - [address] - [phone]  - [phone]   [address] [address]    Phone: [phone]   · oxyCODONE-acetaminophen 5-325 MG per tablet                Stuart Worrell MD  01/02/21 0046

## 2021-01-02 NOTE — ED NOTES
Pt presents with c/o midsternal chest pressure this evening. She was dx with small PE yesterday here , is 2 weeks post partum , , s/p csection. Started on eloquis and has taken all of her appropriate doses thus far. She said she started feeling slightly better than yesterday during hte day today but began having pressure to her chest tonight. She called her OB her told her to come to ER for worsening pain. She says getting up and moving around makes her feel better. She is no longer breastfeeding. Scant vaginal bleeding.      Sandi Branch, RN  21 0024

## 2021-01-03 LAB — QT INTERVAL: 417 MS

## 2021-01-04 ENCOUNTER — TELEPHONE (OUTPATIENT)
Dept: ONCOLOGY | Facility: CLINIC | Age: 31
End: 2021-01-04

## 2021-01-04 ENCOUNTER — POSTPARTUM VISIT (OUTPATIENT)
Dept: OBSTETRICS AND GYNECOLOGY | Facility: CLINIC | Age: 31
End: 2021-01-04

## 2021-01-04 VITALS
DIASTOLIC BLOOD PRESSURE: 70 MMHG | HEIGHT: 69 IN | BODY MASS INDEX: 30.51 KG/M2 | SYSTOLIC BLOOD PRESSURE: 122 MMHG | WEIGHT: 206 LBS

## 2021-01-04 DIAGNOSIS — O24.419 GESTATIONAL DIABETES MELLITUS, CLASS A2: ICD-10-CM

## 2021-01-04 DIAGNOSIS — I26.99 ACUTE PULMONARY EMBOLISM, UNSPECIFIED PULMONARY EMBOLISM TYPE, UNSPECIFIED WHETHER ACUTE COR PULMONALE PRESENT (HCC): ICD-10-CM

## 2021-01-04 PROBLEM — R73.09 ELEVATED GLYCOSYLATED HEMOGLOBIN: Status: RESOLVED | Noted: 2020-06-08 | Resolved: 2021-01-04

## 2021-01-04 PROCEDURE — 99213 OFFICE O/P EST LOW 20 MIN: CPT | Performed by: OBSTETRICS & GYNECOLOGY

## 2021-01-04 RX ORDER — APIXABAN 5 MG/1
TABLET, FILM COATED ORAL EVERY 12 HOURS SCHEDULED
COMMUNITY
Start: 2021-01-01 | End: 2021-02-25

## 2021-01-04 NOTE — TELEPHONE ENCOUNTER
CALLER: PT    REASON:    PT IS ASKING ABOUT NEW PT APPT THAT IS COMING UP. DR. LYNCH HER OB/GYN HAD EXPRESSED THAT THEY WANTED PT TO BE SEEN BEFORE PT WAS BACK AT [facility] ON 1/18/21.     HUB COULD NOT SCHEDULE    BEST C/B: [phone]

## 2021-01-04 NOTE — PROGRESS NOTES
"Postpartum Note    Chief Complaint: Postpartum Visit    HPI      Date of delivery: 12/16/20- Post op.   Pt with VB but nothing heavy.     Pt seen in ER last week and diagnosed with possible left pulmonary embolus after going into ER for CP on 12/31/20. Pt was started on Eliquis. She represented to the ER over the weekend for worsening CP. She was sent home with pain meds. She states she is doing better now and her CP is improved but still present and requiring pain meds. Pt has called Hematology and she spoke with them and they are supposed to call her back with an appt.     Breastfeeding: declines.     Review of Systems   Constitutional: Negative for appetite change, chills, fatigue, fever and unexpected weight change.   Respiratory: Positive for chest tightness.    Cardiovascular: Positive for chest pain.   Gastrointestinal: Negative for abdominal distention, abdominal pain, anal bleeding, blood in stool, constipation, diarrhea, nausea and vomiting.   Genitourinary: Negative for dyspareunia, dysuria, menstrual problem, pelvic pain, vaginal bleeding, vaginal discharge and vaginal pain.       The following portions of the patient's history were reviewed and updated as appropriate: allergies, current medications and problem list.             /70   Ht 175.3 cm (69\")   Wt 93.4 kg (206 lb)   LMP 03/17/2020 (Exact Date)   Breastfeeding No   BMI 30.42 kg/m²       Physical Exam  Vitals signs reviewed.   Constitutional:       Appearance: She is well-developed.   HENT:      Mouth/Throat:      Dentition: Normal dentition. No dental caries.   Cardiovascular:      Rate and Rhythm: Normal rate and regular rhythm.      Heart sounds: Normal heart sounds.   Pulmonary:      Effort: Pulmonary effort is normal. No respiratory distress.      Breath sounds: Normal breath sounds. No stridor. No wheezing.   Abdominal:      General: There is no distension.      Palpations: Abdomen is soft. There is no mass.      Tenderness: " There is no abdominal tenderness.      Comments: Incision C/D/I and healing well.   Musculoskeletal: Normal range of motion.         General: No tenderness.   Skin:     General: Skin is warm.      Coloration: Skin is not pale.      Findings: No erythema or rash.   Neurological:      Mental Status: She is alert and oriented to person, place, and time.      Cranial Nerves: No cranial nerve deficit.      Coordination: Coordination normal.   Psychiatric:         Behavior: Behavior normal.         Thought Content: Thought content normal.         Judgment: Judgment normal.               29yo  s/p primary LTCS        1) POD#12/16/20: Progressing well.     2) Postpartum Care: Bottle feeding    3) Gyn HM: LPS 2020    4) Contraception: Pt encouraged to consider Paragard IUD. Literature given.    5) Hgb 11: Pt to continue iron daily    6) Suspected PE of left lung: pt is on Eliquis. Plans to fu with Hematology. Pt reprots today that her GM had a PE. Will need a thrombophilia workup by Hematology. Pt aware that she should not get pregnant again while on Eliquis and will need Lovenox for any future pregnancy. FU 2 weeks.    7) A2GDM: Pt will need 6 week 2hr PP.      Prachi Kim, DO  2021  16:03 EST

## 2021-01-07 ENCOUNTER — APPOINTMENT (OUTPATIENT)
Dept: OTHER | Facility: HOSPITAL | Age: 31
End: 2021-01-07

## 2021-01-07 ENCOUNTER — CONSULT (OUTPATIENT)
Dept: ONCOLOGY | Facility: CLINIC | Age: 31
End: 2021-01-07

## 2021-01-07 VITALS
DIASTOLIC BLOOD PRESSURE: 92 MMHG | WEIGHT: 206.5 LBS | TEMPERATURE: 98.2 F | RESPIRATION RATE: 16 BRPM | HEART RATE: 73 BPM | BODY MASS INDEX: 31.3 KG/M2 | HEIGHT: 68 IN | SYSTOLIC BLOOD PRESSURE: 156 MMHG | OXYGEN SATURATION: 97 %

## 2021-01-07 DIAGNOSIS — I26.99 ACUTE PULMONARY EMBOLISM, UNSPECIFIED PULMONARY EMBOLISM TYPE, UNSPECIFIED WHETHER ACUTE COR PULMONALE PRESENT (HCC): Primary | ICD-10-CM

## 2021-01-07 LAB
BASOPHILS # BLD AUTO: 0.06 10*3/MM3 (ref 0–0.2)
BASOPHILS NFR BLD AUTO: 0.9 % (ref 0–1.5)
DEPRECATED RDW RBC AUTO: 42.2 FL (ref 37–54)
EOSINOPHIL # BLD AUTO: 0.22 10*3/MM3 (ref 0–0.4)
EOSINOPHIL NFR BLD AUTO: 3.4 % (ref 0.3–6.2)
ERYTHROCYTE [DISTWIDTH] IN BLOOD BY AUTOMATED COUNT: 14.1 % (ref 12.3–15.4)
HCT VFR BLD AUTO: 37.1 % (ref 34–46.6)
HGB BLD-MCNC: 11.9 G/DL (ref 12–15.9)
IMM GRANULOCYTES # BLD AUTO: 0.03 10*3/MM3 (ref 0–0.05)
IMM GRANULOCYTES NFR BLD AUTO: 0.5 % (ref 0–0.5)
LYMPHOCYTES # BLD AUTO: 2.18 10*3/MM3 (ref 0.7–3.1)
LYMPHOCYTES NFR BLD AUTO: 33.7 % (ref 19.6–45.3)
MCH RBC QN AUTO: 26.6 PG (ref 26.6–33)
MCHC RBC AUTO-ENTMCNC: 32.1 G/DL (ref 31.5–35.7)
MCV RBC AUTO: 82.8 FL (ref 79–97)
MONOCYTES # BLD AUTO: 0.52 10*3/MM3 (ref 0.1–0.9)
MONOCYTES NFR BLD AUTO: 8 % (ref 5–12)
NEUTROPHILS NFR BLD AUTO: 3.46 10*3/MM3 (ref 1.7–7)
NEUTROPHILS NFR BLD AUTO: 53.5 % (ref 42.7–76)
NRBC BLD AUTO-RTO: 0.3 /100 WBC (ref 0–0.2)
PLATELET # BLD AUTO: 445 10*3/MM3 (ref 140–450)
PMV BLD AUTO: 10 FL (ref 6–12)
RBC # BLD AUTO: 4.48 10*6/MM3 (ref 3.77–5.28)
WBC # BLD AUTO: 6.47 10*3/MM3 (ref 3.4–10.8)

## 2021-01-07 PROCEDURE — 36415 COLL VENOUS BLD VENIPUNCTURE: CPT

## 2021-01-07 PROCEDURE — 99204 OFFICE O/P NEW MOD 45 MIN: CPT | Performed by: INTERNAL MEDICINE

## 2021-01-07 PROCEDURE — 85025 COMPLETE CBC W/AUTO DIFF WBC: CPT | Performed by: INTERNAL MEDICINE

## 2021-01-07 NOTE — PROGRESS NOTES
Subjective   Es Branch is a 30 y.o. female.  Referred by Mary Dorantes for left sided pulmonary embolism    History of Present Illness   Ms. Branch is a 30-year-old  lady with history of polycystic ovarian syndrome, gestational diabetes who delivered her first child about 3 weeks ago presents for further evaluation of pulmonary embolism.  She had a fairly complicated pregnancy with gestational diabetes and hypertension and underwent a  after failed induction.  She presented to the emergency room on 2020 with complaints of chest pain and pressure for the chest CTA was performed.  CT was suggestive of poor opacification of the pulmonary arteries of the segmental and subsegmental levels with regular opacification within the segmental and subsegmental branches of the left lower pulmonary arterial vasculature.  There are wedge-shaped opacities in the left lower lobe of the lung.  This was concerning for pulmonary embolism.  She was started on Eliquis and discharged home.  She came back to the emergency room subsequently with complaints of chest pain for which she was given hydrocodone to help with the pain.  She was evaluated by her OB at her postpartum visit who referred her to hematology for further management.  Patient denies any previous history of pulmonary embolism or DVT.  She had 1 miscarriage in 2020 at 7 weeks.  She reports a family history of pulmonary embolism with her maternal grandmother having  of a PE.  Maternal aunt also has DVT.  Patient's mother has been diagnosed with leukemia and reportedly on Hydrea.  Patient denies any recent travel.  She smokes about half pack per day.  She had quit with pregnancy however she has resumed smoking in the postpartum period.     The following portions of the patient's history were reviewed and updated as appropriate: allergies, current medications, past family history, past medical history, past social history, past  surgical history and problem list.    Past Medical History:   Diagnosis Date   • Anemia    • Asthma    • Female infertility    • Gestational diabetes    • Headache    • Hypertension    • PCOS (polycystic ovarian syndrome) 2013   • Polycystic ovary syndrome         Past Surgical History:   Procedure Laterality Date   • ADENOIDECTOMY     •  SECTION N/A 2020    Procedure:  SECTION PRIMARY;  Surgeon: Prachi Kim DO;  Location: [facility] LABOR DELIVERY;  Service: Obstetrics/Gynecology;  Laterality: N/A;   • DIAGNOSTIC LAPAROSCOPY N/A 2020    Procedure: DIAGNOSTIC LAPAROSCOPY;  Surgeon: Imelda Rayo MD;  Location: [facility] OR;  Service: Obstetrics/Gynecology;  Laterality: N/A;   • TONSILLECTOMY  2008    adnoids also   • US GUIDED CYST ASPIRATION BREAST N/A 2020        Family History   Problem Relation Age of Onset   • Hypertension Mother    • Cancer Mother    • Hypertension Maternal Grandmother    • Diabetes Maternal Grandfather    • Hypertension Paternal Grandmother    • Diabetes Paternal Grandfather    • Deep vein thrombosis Maternal Aunt    • Breast cancer Neg Hx    • Colon cancer Neg Hx    • Pulmonary embolism Neg Hx    • Anesthesia problems Neg Hx         Social History     Socioeconomic History   • Marital status:      Spouse name: Not on file   • Number of children: Not on file   • Years of education: Not on file   • Highest education level: Not on file   Occupational History   • Occupation: cna     Employer: [employer]   Social Needs   • Financial resource strain: Not hard at all   • Food insecurity     Worry: Never true     Inability: Never true   • Transportation needs     Medical: No     Non-medical: No   Tobacco Use   • Smoking status: Current Every Day Smoker     Packs/day: 0.25     Types: Cigarettes     Last attempt to quit: 2020     Years since quittin.5   • Smokeless tobacco: Never Used   Substance and Sexual Activity   • Alcohol use:  "Not Currently   • Drug use: No   • Sexual activity: Yes     Partners: Male     Birth control/protection: None   Lifestyle   • Physical activity     Days per week: 4 days     Minutes per session: 60 min   • Stress: Not at all        OB History        2    Para   1    Term   1            AB   1    Living   1       SAB   1    TAB        Ectopic        Molar        Multiple   0    Live Births   1                 No Known Allergies         Review of Systems   Constitutional: Negative.    HENT: Negative.    Eyes: Negative.    Respiratory: Positive for chest tightness.    Cardiovascular: Positive for chest pain.   Gastrointestinal: Negative.    Endocrine: Negative.    Genitourinary: Negative.    Musculoskeletal: Negative.    Allergic/Immunologic: Negative.    Neurological: Negative.    Hematological: Negative.    Psychiatric/Behavioral: Negative.          Objective   Blood pressure 156/92, pulse 73, temperature 98.2 °F (36.8 °C), resp. rate 16, height 172 cm (67.72\"), weight 93.7 kg (206 lb 8 oz), SpO2 97 %, not currently breastfeeding.   Physical Exam  Vitals signs reviewed.   Constitutional:       Appearance: Normal appearance.   HENT:      Head: Normocephalic and atraumatic.      Right Ear: External ear normal.      Left Ear: External ear normal.      Nose: Nose normal. No congestion or rhinorrhea.      Mouth/Throat:      Mouth: Mucous membranes are moist.      Pharynx: Oropharynx is clear.   Eyes:      General: No scleral icterus.        Right eye: No discharge.         Left eye: No discharge.      Conjunctiva/sclera: Conjunctivae normal.      Pupils: Pupils are equal, round, and reactive to light.   Neck:      Musculoskeletal: Normal range of motion and neck supple.   Cardiovascular:      Rate and Rhythm: Normal rate and regular rhythm.      Pulses: Normal pulses.      Heart sounds: Normal heart sounds.   Pulmonary:      Effort: Pulmonary effort is normal. No respiratory distress.      Breath sounds: " Normal breath sounds. No wheezing or rhonchi.   Chest:      Chest wall: No tenderness.   Abdominal:      General: Abdomen is flat. Bowel sounds are normal.   Musculoskeletal: Normal range of motion.         General: No swelling, tenderness, deformity or signs of injury.      Right lower leg: No edema.      Left lower leg: No edema.   Skin:     General: Skin is warm.      Coloration: Skin is not jaundiced or pale.      Findings: No bruising or erythema.   Neurological:      General: No focal deficit present.      Mental Status: She is alert and oriented to person, place, and time. Mental status is at baseline.   Psychiatric:         Mood and Affect: Mood normal.         Behavior: Behavior normal.         Thought Content: Thought content normal.         Judgment: Judgment normal.           Appointment on 01/07/2021   Component Date Value Ref Range Status   • WBC 01/07/2021 6.47  3.40 - 10.80 10*3/mm3 Final   • RBC 01/07/2021 4.48  3.77 - 5.28 10*6/mm3 Final   • Hemoglobin 01/07/2021 11.9* 12.0 - 15.9 g/dL Final   • Hematocrit 01/07/2021 37.1  34.0 - 46.6 % Final   • MCV 01/07/2021 82.8  79.0 - 97.0 fL Final   • MCH 01/07/2021 26.6  26.6 - 33.0 pg Final   • MCHC 01/07/2021 32.1  31.5 - 35.7 g/dL Final   • RDW 01/07/2021 14.1  12.3 - 15.4 % Final   • RDW-SD 01/07/2021 42.2  37.0 - 54.0 fl Final   • MPV 01/07/2021 10.0  6.0 - 12.0 fL Final   • Platelets 01/07/2021 445  140 - 450 10*3/mm3 Final   • Neutrophil % 01/07/2021 53.5  42.7 - 76.0 % Final   • Lymphocyte % 01/07/2021 33.7  19.6 - 45.3 % Final   • Monocyte % 01/07/2021 8.0  5.0 - 12.0 % Final   • Eosinophil % 01/07/2021 3.4  0.3 - 6.2 % Final   • Basophil % 01/07/2021 0.9  0.0 - 1.5 % Final   • Immature Grans % 01/07/2021 0.5  0.0 - 0.5 % Final   • Neutrophils, Absolute 01/07/2021 3.46  1.70 - 7.00 10*3/mm3 Final   • Lymphocytes, Absolute 01/07/2021 2.18  0.70 - 3.10 10*3/mm3 Final   • Monocytes, Absolute 01/07/2021 0.52  0.10 - 0.90 10*3/mm3 Final   • Eosinophils,  Absolute 01/07/2021 0.22  0.00 - 0.40 10*3/mm3 Final   • Basophils, Absolute 01/07/2021 0.06  0.00 - 0.20 10*3/mm3 Final   • Immature Grans, Absolute 01/07/2021 0.03  0.00 - 0.05 10*3/mm3 Final   • nRBC 01/07/2021 0.3* 0.0 - 0.2 /100 WBC Final   Admission on 12/31/2020, Discharged on 12/31/2020   Component Date Value Ref Range Status   • QT Interval 12/31/2020 417  ms Final   • Glucose 12/31/2020 85  65 - 99 mg/dL Final   • BUN 12/31/2020 14  6 - 20 mg/dL Final   • Creatinine 12/31/2020 0.85  0.57 - 1.00 mg/dL Final   • Sodium 12/31/2020 137  136 - 145 mmol/L Final   • Potassium 12/31/2020 3.7  3.5 - 5.2 mmol/L Final   • Chloride 12/31/2020 103  98 - 107 mmol/L Final   • CO2 12/31/2020 24.0  22.0 - 29.0 mmol/L Final   • Calcium 12/31/2020 8.7  8.6 - 10.5 mg/dL Final   • Total Protein 12/31/2020 6.5  6.0 - 8.5 g/dL Final   • Albumin 12/31/2020 3.90  3.50 - 5.20 g/dL Final   • ALT (SGPT) 12/31/2020 9  1 - 33 U/L Final   • AST (SGOT) 12/31/2020 13  1 - 32 U/L Final   • Alkaline Phosphatase 12/31/2020 109  39 - 117 U/L Final   • Total Bilirubin 12/31/2020 <0.2  0.0 - 1.2 mg/dL Final   • eGFR Non African Amer 12/31/2020 79  >60 mL/min/1.73 Final   • Globulin 12/31/2020 2.6  gm/dL Final   • A/G Ratio 12/31/2020 1.5  g/dL Final   • BUN/Creatinine Ratio 12/31/2020 16.5  7.0 - 25.0 Final   • Anion Gap 12/31/2020 10.0  5.0 - 15.0 mmol/L Final   • Troponin T 12/31/2020 <0.010  0.000 - 0.030 ng/mL Final   • D-Dimer, Quantitative 12/31/2020 1.84* 0.00 - 0.46 MCGFEU/mL Final   • proBNP 12/31/2020 64.8  0.0 - 450.0 pg/mL Final   • WBC 12/31/2020 8.68  3.40 - 10.80 10*3/mm3 Final   • RBC 12/31/2020 4.07  3.77 - 5.28 10*6/mm3 Final   • Hemoglobin 12/31/2020 11.0* 12.0 - 15.9 g/dL Final   • Hematocrit 12/31/2020 35.3  34.0 - 46.6 % Final   • MCV 12/31/2020 86.7  79.0 - 97.0 fL Final   • MCH 12/31/2020 27.0  26.6 - 33.0 pg Final   • MCHC 12/31/2020 31.2* 31.5 - 35.7 g/dL Final   • RDW 12/31/2020 13.8  12.3 - 15.4 % Final   • RDW-SD  12/31/2020 43.3  37.0 - 54.0 fl Final   • MPV 12/31/2020 10.0  6.0 - 12.0 fL Final   • Platelets 12/31/2020 489* 140 - 450 10*3/mm3 Final   • Neutrophil % 12/31/2020 61.8  42.7 - 76.0 % Final   • Lymphocyte % 12/31/2020 27.1  19.6 - 45.3 % Final   • Monocyte % 12/31/2020 7.9  5.0 - 12.0 % Final   • Eosinophil % 12/31/2020 2.4  0.3 - 6.2 % Final   • Basophil % 12/31/2020 0.6  0.0 - 1.5 % Final   • Immature Grans % 12/31/2020 0.2  0.0 - 0.5 % Final   • Neutrophils, Absolute 12/31/2020 5.36  1.70 - 7.00 10*3/mm3 Final   • Lymphocytes, Absolute 12/31/2020 2.35  0.70 - 3.10 10*3/mm3 Final   • Monocytes, Absolute 12/31/2020 0.69  0.10 - 0.90 10*3/mm3 Final   • Eosinophils, Absolute 12/31/2020 0.21  0.00 - 0.40 10*3/mm3 Final   • Basophils, Absolute 12/31/2020 0.05  0.00 - 0.20 10*3/mm3 Final   • Immature Grans, Absolute 12/31/2020 0.02  0.00 - 0.05 10*3/mm3 Final   • nRBC 12/31/2020 0.0  0.0 - 0.2 /100 WBC Final   • PTT 12/31/2020 28.0  24.3 - 38.1 seconds Final   • Protime 12/31/2020 13.8  12.1 - 15.0 Seconds Final   • INR 12/31/2020 1.09  0.90 - 1.10 Final   Admission on 12/15/2020, Discharged on 12/18/2020   Component Date Value Ref Range Status   • THC, Screen, Urine 12/15/2020 Negative  Negative Final   • Phencyclidine (PCP), Urine 12/15/2020 Negative  Negative Final   • Cocaine Screen, Urine 12/15/2020 Negative  Negative Final   • Methamphetamine, Ur 12/15/2020 Negative  Negative Final   • Opiate Screen 12/15/2020 Negative  Negative Final   • Amphetamine Screen, Urine 12/15/2020 Negative  Negative Final   • Benzodiazepine Screen, Urine 12/15/2020 Negative  Negative Final   • Tricyclic Antidepressants Screen 12/15/2020 Negative  Negative Final   • Methadone Screen, Urine 12/15/2020 Negative  Negative Final   • Barbiturates Screen, Urine 12/15/2020 Negative  Negative Final   • Oxycodone Screen, Urine 12/15/2020 Negative  Negative Final   • Propoxyphene Screen 12/15/2020 Negative  Negative Final   • Buprenorphine,  Screen, Urine 12/15/2020 Negative  Negative Final   • WBC 12/15/2020 10.90* 3.40 - 10.80 10*3/mm3 Final   • RBC 12/15/2020 3.51* 3.77 - 5.28 10*6/mm3 Final   • Hemoglobin 12/15/2020 9.7* 12.0 - 15.9 g/dL Final   • Hematocrit 12/15/2020 30.2* 34.0 - 46.6 % Final   • MCV 12/15/2020 86.0  79.0 - 97.0 fL Final   • MCH 12/15/2020 27.6  26.6 - 33.0 pg Final   • MCHC 12/15/2020 32.1  31.5 - 35.7 g/dL Final   • RDW 12/15/2020 13.2  12.3 - 15.4 % Final   • RDW-SD 12/15/2020 41.6  37.0 - 54.0 fl Final   • MPV 12/15/2020 10.6  6.0 - 12.0 fL Final   • Platelets 12/15/2020 290  140 - 450 10*3/mm3 Final   • ABO Type 12/15/2020 O   Final   • RH type 12/15/2020 Positive   Final   • Antibody Screen 12/15/2020 Negative   Final   • T&S Expiration Date 12/15/2020 12/18/2020 11:59:59 PM   Final   • Hemoglobin 12/17/2020 8.6* 12.0 - 15.9 g/dL Final   • Hematocrit 12/17/2020 27.4* 34.0 - 46.6 % Final   Hospital Outpatient Visit on 12/15/2020   Component Date Value Ref Range Status   • Glucose 12/15/2020 73  70 - 130 mg/dL Final   • Glucose 12/15/2020 77  70 - 130 mg/dL Final   • Glucose 12/15/2020 120  70 - 130 mg/dL Final   • Glucose 12/15/2020 146* 70 - 130 mg/dL Final   • Glucose 12/16/2020 86  70 - 130 mg/dL Final   • Glucose 12/16/2020 97  70 - 130 mg/dL Final   Lab on 12/12/2020   Component Date Value Ref Range Status   • SARS-CoV-2 BLANKA 12/12/2020 Not Detected  Not Detected Final   Routine Prenatal on 12/10/2020   Component Date Value Ref Range Status   • Glucose, UA 12/10/2020 Negative  Negative, 1000 mg/dL (3+) Final   • Protein, POC 12/10/2020 Negative  Negative Final        No radiology results for the last 30 days.      CBC on 7/20/2020-WBC 6.47, hemoglobin 11.9, platelets 445    Assessment/Plan       30-year-old premenopausal  lady who is currently 3 weeks postpartum presents with recent diagnosis of left-sided pulmonary embolism    *Subsegmental and subsegmental pulmonary embolism  · She has been started on Eliquis  for the treatment of pulmonary embolism  · Continue the same  · Pulmonary embolus is most likely due to the recent pregnancy and hence provoked  · She denies any previous history of blood clots or DVT  · She also smokes which increase the risk of DVT and PE  · However she does have a family history of blood clots in her maternal grandmother maternal aunt  · This warrants a hypercoagulability work-up  · Since she is postpartum this may affect some of the results of the hypercoagulable work-up  · Therefore I choose to wait about 4 weeks prior to performing a thrombophilia work-up  · Plan to see him back in 6 weeks to review the results and determine the duration of anticoagulation    *History of prior miscarriage  · Miscarriage in first trimester  · Rule out APS given PE and history of miscarriage    *Tobacco use  · Extensive counseling performed today for smoking cessation.  · Discussed increased risk of DVT PE, malignancies, coronary artery disease associated with smoking.  · Patient is going to quit smoking.    *Anemia  · Hemoglobin noted to be 11.9, normocytic  · Most likely secondary to iron deficiency from recent pregnancy  · Recheck CBC at follow-up and if hemoglobin continues to be low then obtain iron studies B12 and folic acid.    *Follow-up-6 weeks with me, 4 weeks for labs    I spent 45 minutes on encounter with Ms. Branch.

## 2021-01-12 ENCOUNTER — HOSPITAL ENCOUNTER (EMERGENCY)
Facility: HOSPITAL | Age: 31
Discharge: HOME | End: 2021-01-13
Attending: EMERGENCY MEDICINE | Admitting: EMERGENCY MEDICINE

## 2021-01-12 ENCOUNTER — APPOINTMENT (OUTPATIENT)
Dept: CT IMAGING | Facility: HOSPITAL | Age: 31
End: 2021-01-12

## 2021-01-12 VITALS
HEART RATE: 60 BPM | WEIGHT: 211.4 LBS | TEMPERATURE: 98.4 F | HEIGHT: 69 IN | RESPIRATION RATE: 18 BRPM | SYSTOLIC BLOOD PRESSURE: 150 MMHG | BODY MASS INDEX: 31.31 KG/M2 | DIASTOLIC BLOOD PRESSURE: 89 MMHG | OXYGEN SATURATION: 95 %

## 2021-01-12 DIAGNOSIS — R07.9 CHEST PAIN, UNSPECIFIED TYPE: Primary | ICD-10-CM

## 2021-01-12 LAB
ALBUMIN SERPL-MCNC: 4.3 G/DL (ref 3.5–5.2)
ALBUMIN/GLOB SERPL: 1.4 G/DL
ALP SERPL-CCNC: 121 U/L (ref 39–117)
ALT SERPL W P-5'-P-CCNC: 12 U/L (ref 1–33)
ANION GAP SERPL CALCULATED.3IONS-SCNC: 8.7 MMOL/L (ref 5–15)
APTT PPP: 28.4 SECONDS (ref 24.3–38.1)
AST SERPL-CCNC: 17 U/L (ref 1–32)
BASOPHILS # BLD AUTO: 0.06 10*3/MM3 (ref 0–0.2)
BASOPHILS NFR BLD AUTO: 0.5 % (ref 0–1.5)
BILIRUB SERPL-MCNC: 0.2 MG/DL (ref 0–1.2)
BUN SERPL-MCNC: 12 MG/DL (ref 6–20)
BUN/CREAT SERPL: 12.6 (ref 7–25)
CALCIUM SPEC-SCNC: 9.2 MG/DL (ref 8.6–10.5)
CHLORIDE SERPL-SCNC: 101 MMOL/L (ref 98–107)
CO2 SERPL-SCNC: 26.3 MMOL/L (ref 22–29)
CREAT SERPL-MCNC: 0.95 MG/DL (ref 0.57–1)
DEPRECATED RDW RBC AUTO: 44.8 FL (ref 37–54)
EOSINOPHIL # BLD AUTO: 0.24 10*3/MM3 (ref 0–0.4)
EOSINOPHIL NFR BLD AUTO: 2.2 % (ref 0.3–6.2)
ERYTHROCYTE [DISTWIDTH] IN BLOOD BY AUTOMATED COUNT: 14.4 % (ref 12.3–15.4)
GFR SERPL CREATININE-BSD FRML MDRD: 69 ML/MIN/1.73
GLOBULIN UR ELPH-MCNC: 3 GM/DL
GLUCOSE SERPL-MCNC: 102 MG/DL (ref 65–99)
HCT VFR BLD AUTO: 40.9 % (ref 34–46.6)
HGB BLD-MCNC: 12.7 G/DL (ref 12–15.9)
IMM GRANULOCYTES # BLD AUTO: 0.02 10*3/MM3 (ref 0–0.05)
IMM GRANULOCYTES NFR BLD AUTO: 0.2 % (ref 0–0.5)
INR PPP: 1.14 (ref 0.9–1.1)
LYMPHOCYTES # BLD AUTO: 4.48 10*3/MM3 (ref 0.7–3.1)
LYMPHOCYTES NFR BLD AUTO: 40.7 % (ref 19.6–45.3)
MCH RBC QN AUTO: 26.5 PG (ref 26.6–33)
MCHC RBC AUTO-ENTMCNC: 31.1 G/DL (ref 31.5–35.7)
MCV RBC AUTO: 85.4 FL (ref 79–97)
MONOCYTES # BLD AUTO: 1.03 10*3/MM3 (ref 0.1–0.9)
MONOCYTES NFR BLD AUTO: 9.3 % (ref 5–12)
NEUTROPHILS NFR BLD AUTO: 47.1 % (ref 42.7–76)
NEUTROPHILS NFR BLD AUTO: 5.19 10*3/MM3 (ref 1.7–7)
NRBC BLD AUTO-RTO: 0 /100 WBC (ref 0–0.2)
PLATELET # BLD AUTO: 441 10*3/MM3 (ref 140–450)
PMV BLD AUTO: 10 FL (ref 6–12)
POTASSIUM SERPL-SCNC: 3.6 MMOL/L (ref 3.5–5.2)
PROT SERPL-MCNC: 7.3 G/DL (ref 6–8.5)
PROTHROMBIN TIME: 14.3 SECONDS (ref 12.1–15)
RBC # BLD AUTO: 4.79 10*6/MM3 (ref 3.77–5.28)
SODIUM SERPL-SCNC: 136 MMOL/L (ref 136–145)
TROPONIN T SERPL-MCNC: <0.01 NG/ML (ref 0–0.03)
WBC # BLD AUTO: 11.02 10*3/MM3 (ref 3.4–10.8)

## 2021-01-12 PROCEDURE — 85730 THROMBOPLASTIN TIME PARTIAL: CPT | Performed by: EMERGENCY MEDICINE

## 2021-01-12 PROCEDURE — 84484 ASSAY OF TROPONIN QUANT: CPT | Performed by: EMERGENCY MEDICINE

## 2021-01-12 PROCEDURE — 99284 EMERGENCY DEPT VISIT MOD MDM: CPT

## 2021-01-12 PROCEDURE — 85610 PROTHROMBIN TIME: CPT | Performed by: EMERGENCY MEDICINE

## 2021-01-12 PROCEDURE — 96375 TX/PRO/DX INJ NEW DRUG ADDON: CPT

## 2021-01-12 PROCEDURE — 25010000002 KETOROLAC TROMETHAMINE PER 15 MG: Performed by: EMERGENCY MEDICINE

## 2021-01-12 PROCEDURE — 0 IOPAMIDOL PER 1 ML: Performed by: EMERGENCY MEDICINE

## 2021-01-12 PROCEDURE — 25010000002 ONDANSETRON PER 1 MG: Performed by: EMERGENCY MEDICINE

## 2021-01-12 PROCEDURE — 96374 THER/PROPH/DIAG INJ IV PUSH: CPT

## 2021-01-12 PROCEDURE — 80053 COMPREHEN METABOLIC PANEL: CPT | Performed by: EMERGENCY MEDICINE

## 2021-01-12 PROCEDURE — 93005 ELECTROCARDIOGRAM TRACING: CPT | Performed by: EMERGENCY MEDICINE

## 2021-01-12 PROCEDURE — 85025 COMPLETE CBC W/AUTO DIFF WBC: CPT | Performed by: EMERGENCY MEDICINE

## 2021-01-12 PROCEDURE — 99283 EMERGENCY DEPT VISIT LOW MDM: CPT | Performed by: EMERGENCY MEDICINE

## 2021-01-12 PROCEDURE — 71275 CT ANGIOGRAPHY CHEST: CPT

## 2021-01-12 PROCEDURE — 93010 ELECTROCARDIOGRAM REPORT: CPT | Performed by: INTERNAL MEDICINE

## 2021-01-12 RX ORDER — ONDANSETRON 2 MG/ML
4 INJECTION INTRAMUSCULAR; INTRAVENOUS ONCE
Status: COMPLETED | OUTPATIENT
Start: 2021-01-12 | End: 2021-01-12

## 2021-01-12 RX ORDER — KETOROLAC TROMETHAMINE 30 MG/ML
30 INJECTION, SOLUTION INTRAMUSCULAR; INTRAVENOUS ONCE
Status: COMPLETED | OUTPATIENT
Start: 2021-01-12 | End: 2021-01-12

## 2021-01-12 RX ORDER — SODIUM CHLORIDE 0.9 % (FLUSH) 0.9 %
10 SYRINGE (ML) INJECTION AS NEEDED
Status: DISCONTINUED | OUTPATIENT
Start: 2021-01-12 | End: 2021-01-13 | Stop reason: HOSPADM

## 2021-01-12 RX ADMIN — KETOROLAC TROMETHAMINE 30 MG: 30 INJECTION, SOLUTION INTRAMUSCULAR at 22:46

## 2021-01-12 RX ADMIN — IOPAMIDOL 18 ML: 755 INJECTION, SOLUTION INTRAVENOUS at 23:33

## 2021-01-12 RX ADMIN — ONDANSETRON 4 MG: 2 INJECTION, SOLUTION INTRAMUSCULAR; INTRAVENOUS at 22:46

## 2021-01-12 RX ADMIN — IOPAMIDOL 100 ML: 755 INJECTION, SOLUTION INTRAVENOUS at 23:33

## 2021-01-13 LAB — QT INTERVAL: 410 MS

## 2021-01-13 NOTE — DISCHARGE INSTRUCTIONS
Your work-up tonight is unremarkable.  No PE seen on CAT scan of your chest.  Continue current medications.  Follow-up with your PCP as above.  Return to ED for medical emergencies.

## 2021-01-13 NOTE — ED PROVIDER NOTES
Subjective   Es Branch is a 30-year-old white female who presents secondary to chest pain and shortness of breath.  Sudden onset of symptoms 30 minutes prior to coming to the emergency room.  Patient describes a pressure in the center of her chest.  No aggravating or alleviating factors.  Accompanying shortness of breath, nausea and vomiting.  Patient reports these symptoms are similar to when she had a PE which was diagnosed on 2020.  Patient presents for evaluation.      History provided by:  Patient      Review of Systems   Constitutional: Negative.  Negative for fever.   HENT: Negative.  Negative for rhinorrhea.    Eyes: Negative.  Negative for redness.   Respiratory: Positive for shortness of breath. Negative for cough.    Cardiovascular: Positive for chest pain.   Gastrointestinal: Positive for nausea and vomiting. Negative for abdominal pain.   Endocrine: Negative.    Genitourinary: Negative.  Negative for difficulty urinating.   Musculoskeletal: Negative.  Negative for back pain.   Skin: Negative.  Negative for color change.   Neurological: Negative.  Negative for syncope.   Hematological: Negative.    Psychiatric/Behavioral: The patient is nervous/anxious.    All other systems reviewed and are negative.      Past Medical History:   Diagnosis Date   • Anemia    • Asthma    • Female infertility    • Gestational diabetes    • Headache    • Hypertension    • PCOS (polycystic ovarian syndrome) 2013   • Polycystic ovary syndrome        No Known Allergies    Past Surgical History:   Procedure Laterality Date   • ADENOIDECTOMY     •  SECTION N/A 2020    Procedure:  SECTION PRIMARY;  Surgeon: Prachi Kim DO;  Location: [facility] LABOR DELIVERY;  Service: Obstetrics/Gynecology;  Laterality: N/A;   • DIAGNOSTIC LAPAROSCOPY N/A 2020    Procedure: DIAGNOSTIC LAPAROSCOPY;  Surgeon: Imelda Rayo MD;  Location: [facility] OR;  Service: Obstetrics/Gynecology;   Laterality: N/A;   • TONSILLECTOMY  2008    adnoids also   • US GUIDED CYST ASPIRATION BREAST N/A 2020       Family History   Problem Relation Age of Onset   • Hypertension Mother    • Cancer Mother    • Hypertension Maternal Grandmother    • Diabetes Maternal Grandfather    • Hypertension Paternal Grandmother    • Diabetes Paternal Grandfather    • Deep vein thrombosis Maternal Aunt    • Breast cancer Neg Hx    • Colon cancer Neg Hx    • Pulmonary embolism Neg Hx    • Anesthesia problems Neg Hx        Social History     Socioeconomic History   • Marital status:      Spouse name: Not on file   • Number of children: Not on file   • Years of education: Not on file   • Highest education level: Not on file   Occupational History   • Occupation: cna     Employer: [email or web address]   Social Needs   • Financial resource strain: Not hard at all   • Food insecurity     Worry: Never true     Inability: Never true   • Transportation needs     Medical: No     Non-medical: No   Tobacco Use   • Smoking status: Current Every Day Smoker     Packs/day: 0.25     Types: Cigarettes     Last attempt to quit: 2020     Years since quittin.5   • Smokeless tobacco: Never Used   Substance and Sexual Activity   • Alcohol use: Not Currently   • Drug use: No   • Sexual activity: Yes     Partners: Male     Birth control/protection: None   Lifestyle   • Physical activity     Days per week: 4 days     Minutes per session: 60 min   • Stress: Not at all           Objective   Physical Exam  Vitals signs and nursing note reviewed.   Constitutional:       General: She is in acute distress.      Appearance: She is well-developed. She is obese. She is not ill-appearing, toxic-appearing or diaphoretic.      Comments: 30-year-old white female laying in bed.  Patient is obese.  She otherwise appears in good health.  Patient appears very anxious and in discomfort.  Vital signs notable for BP of 147/102.  Otherwise unremarkable.   HENT:       Head: Normocephalic and atraumatic.      Right Ear: External ear normal.      Left Ear: External ear normal.      Nose: Nose normal.   Eyes:      Extraocular Movements: Extraocular movements intact.      Conjunctiva/sclera: Conjunctivae normal.      Pupils: Pupils are equal, round, and reactive to light.   Neck:      Musculoskeletal: Normal range of motion and neck supple.      Vascular: No JVD.      Trachea: No tracheal deviation.   Cardiovascular:      Rate and Rhythm: Normal rate and regular rhythm.      Heart sounds: Normal heart sounds. No murmur. No friction rub. No gallop.    Pulmonary:      Effort: Pulmonary effort is normal. No respiratory distress.      Breath sounds: Normal breath sounds. No wheezing, rhonchi or rales.   Chest:      Chest wall: No tenderness.   Abdominal:      General: Bowel sounds are normal. There is no distension.      Palpations: Abdomen is soft.      Tenderness: There is no abdominal tenderness. There is no guarding or rebound.   Musculoskeletal: Normal range of motion.   Skin:     General: Skin is warm and dry.   Neurological:      General: No focal deficit present.      Mental Status: She is alert and oriented to person, place, and time.      Deep Tendon Reflexes: Reflexes are normal and symmetric.   Psychiatric:         Mood and Affect: Mood is anxious.         Behavior: Behavior normal.         Thought Content: Thought content normal.         Judgment: Judgment normal.         Procedures       EKG 12-lead  Date 1/12/2021  Time 22: 34  Normal sinus rhythm.  Normal rate.  Normal axis.  Normal intervals.  Normal ST segments.  Normal T waves.  Normal EKG.      ED Course  ED Course as of Jan 13 0002 Tue Jan 12, 2021   2242 Patient gave birth on 12/15/2020.  She had a PE on 12/21/2020.  Patient is anticoagulated.  Blood clot is thus a very low probability.  However with patient's history obtaining CT angio of chest as well as full set of lab work.  EKG is unremarkable.  Giving  pain and nausea medications.    [SS]   2332 CBC shows minimal leukocytosis with white count 11.02K.  Otherwise unremarkable.  CMP unremarkable.  PT, PTT and INR unremarkable.  Troponin is negative.  Awaiting CT results.    [SS]   2359 CT is unremarkable.  No evidence of PE.  I find no source for patient's chest pain.  I suspect anxiety is a component.      Patient resting comfortably in bed. I have discussed at length with patient all results, diagnoses, treatment, indications to return to emergency room and follow-up.  Will d/c home.        [SS]      ED Course User Index  [SS] Sam Hutton MD      Labs Reviewed   COMPREHENSIVE METABOLIC PANEL - Abnormal; Notable for the following components:       Result Value    Glucose 102 (*)     Alkaline Phosphatase 121 (*)     All other components within normal limits    Narrative:     GFR Normal >60  Chronic Kidney Disease <60  Kidney Failure <15     PROTIME-INR - Abnormal; Notable for the following components:    INR 1.14 (*)     All other components within normal limits    Narrative:     Therapeutic Ranges for INR: 2.0-3.0 (PT 20-30)                              2.5-3.5 (PT 25-34)   CBC WITH AUTO DIFFERENTIAL - Abnormal; Notable for the following components:    WBC 11.02 (*)     MCH 26.5 (*)     MCHC 31.1 (*)     Lymphocytes, Absolute 4.48 (*)     Monocytes, Absolute 1.03 (*)     All other components within normal limits   APTT - Normal    Narrative:     PTT = The equivalent PTT values for the therapeutic range of heparin levels at 0.1 to 0.7 U/ml are 53 to 110 seconds.     TROPONIN (IN-HOUSE) - Normal    Narrative:     Troponin T Reference Range:  <= 0.03 ng/mL-   Negative for AMI  >0.03 ng/mL-     Abnormal for myocardial necrosis.  Clinicians would have to utilize clinical acumen, EKG, Troponin and serial changes to determine if it is an Acute Myocardial Infarction or myocardial injury due to an underlying chronic condition.       Results may be falsely decreased if  patient taking Biotin.     CBC AND DIFFERENTIAL    Narrative:     The following orders were created for panel order CBC & Differential.  Procedure                               Abnormality         Status                     ---------                               -----------         ------                     CBC Auto Differential[[id number]]        Abnormal            Final result                 Please view results for these tests on the individual orders.     Ct Angiogram Chest    Result Date: 1/12/2021  Narrative: CTA Chest INDICATION: Chest pain TECHNIQUE: CT angiogram of the chest with IV contrast. 3-D reconstructions were obtained and reviewed.   Radiation dose reduction techniques included automated exposure control or exposure modulation based on body size. Count of known CT and cardiac nuc med studies performed in previous 12 months: 0. COMPARISON: CTA of the chest from 12/31/2020 FINDINGS: No evidence of pulmonary embolism. Lung fields are grossly clear. No evidence of lymphadenopathy. Upper abdominal structures are grossly unremarkable. Osseous structures are unremarkable. The common bile duct measures approximately 9 mm in the region of the pancreatic head. If positive, report RV/LV ratio if >.0.9     Impression: 1. Note definite pulmonary embolism detected. 2. No acute findings in the chest. 3. The common bile duct appears slightly prominent in size. This may be within normal limits for this patient. Signer Name: Leyda Adams MD  Signed: 1/12/2021 11:50 PM  Workstation Name: [id number]  [facility]    Ct Angiogram Chest    Result Date: 12/31/2020  Narrative: CTA Chest INDICATION: Recent postpartum with sudden onset left chest pain and elevated d-dimer, concern for pulmonary TECHNIQUE: CT angiogram of the chest with IV contrast. 3-D reconstructions were obtained and reviewed.   Radiation dose reduction techniques included automated exposure control or exposure modulation based on  body size. Count of known CT and cardiac nuc med studies performed in previous 12 months: 0. COMPARISON: None available. FINDINGS: Pulmonary arterial opacification is only good centrally with diminished opacification of the segmental branches and subsegmental branches of the pulmonary arterial vasculature.. However, there is patchy somewhat irregular opacification of the segmental and subsegmental branches within the left lower lobe worrisome for small pulmonary emboli though not definitive. Additionally, there are peripheral very small wedge-shaped opacities abutting the pleural surface which could represent early changes associated with pulmonary embolus. No confluent infiltrate in the lungs otherwise. No pleural effusion. No pneumothorax. Thoracic aorta normal in course and caliber without dissection. Heart size is normal. No pericardial effusion. Aberrant right subclavian artery following retropharyngeal course. The RV/LV ratio is not elevated. Visualized upper abdomen appears within normal limits allowing for limited field-of-view and contrast bolus timing.  IMPRESSION: 1.  Poor opacification of the pulmonary arteries at the segmental and subsegmental level. Allowing for this there is somewhat irregular opacification within the segmental and subsegmental branches of the left lower lobe pulmonary arterial vasculature. No definite focal filling defect is seen but there are some peripherally based small wedge-shaped opacities within the left lower lobe of the lung. The combination of findings are worrisome for small subsegmental or segmental pulmonary emboli though not definitive. 2.  Aberrant right subclavian artery following a retropharyngeal course. I discussed the above findings of potential segmental/subsegmental pulmonary embolus with the patient's ordering provider Mary Dorantes via telephone conversation at 9:00 PM on 12/31/2020. Signer Name: JEAN PIERRE ALICEA MD  Signed: 12/31/2020 9:05 PM  Workstation Name:  [facility]  [facility]    My differential diagnosis for chest pain includes but is not limited to:  Muscle strain, costochondritis, myositis, pleurisy, rib fracture, intercostal neuritis, herpes zoster, tumor, myocardial infarction, coronary syndrome, unstable angina, angina, aortic dissection, mitral valve prolapse, pericarditis, palpitations, pulmonary embolus, pneumonia, pneumothorax, lung cancer, GERD, esophagitis, esophageal spasm                                     HEART Score (for prediction of 6-week risk of major adverse cardiac event) reviewed and/or performed as part of the patient evaluation and treatment planning process.  The result associated with this review/performance is: 1       MDM    Final diagnoses:   Chest pain, unspecified type            Sam Hutton MD  01/13/21 0002

## 2021-01-13 NOTE — ED NOTES
Pt presents with c/o midsternal CP x approx 30 min PTA. She has a known PE which developed post partum from her delivery 4 weeks ago; . She has been seen in the ER last on  for same pain complaint of sudden onset of pain. She states she has felt better since then, has been taking her eloquis as directed and has a f/u appt soon with her MD for repeat labs. She states pain started suddenly tonight and now she feels flushed and cant get comfortable. She says she is still smoking but is trying to quit. Small amount of post partum vaginal bleeding. She is A&Ox4. ambulates with a steady gait.      Sandi Branch RN  [phone]       Sandi Branch RN  [phone]

## 2021-01-18 ENCOUNTER — POSTPARTUM VISIT (OUTPATIENT)
Dept: OBSTETRICS AND GYNECOLOGY | Facility: CLINIC | Age: 31
End: 2021-01-18

## 2021-01-18 ENCOUNTER — TELEPHONE (OUTPATIENT)
Dept: OBSTETRICS AND GYNECOLOGY | Facility: CLINIC | Age: 31
End: 2021-01-18

## 2021-01-18 VITALS
HEIGHT: 69 IN | SYSTOLIC BLOOD PRESSURE: 150 MMHG | BODY MASS INDEX: 30.07 KG/M2 | WEIGHT: 203 LBS | DIASTOLIC BLOOD PRESSURE: 86 MMHG

## 2021-01-18 DIAGNOSIS — Z13.9 SCREENING FOR CONDITION: ICD-10-CM

## 2021-01-18 LAB
B-HCG UR QL: NEGATIVE
BILIRUB BLD-MCNC: NEGATIVE MG/DL
CLARITY, POC: CLEAR
COLOR UR: YELLOW
GLUCOSE UR STRIP-MCNC: NEGATIVE MG/DL
INTERNAL NEGATIVE CONTROL: NEGATIVE
INTERNAL POSITIVE CONTROL: POSITIVE
KETONES UR QL: NEGATIVE
LEUKOCYTE EST, POC: NEGATIVE
Lab: NORMAL
NITRITE UR-MCNC: NEGATIVE MG/ML
PH UR: 6 [PH] (ref 5–8)
PROT UR STRIP-MCNC: NEGATIVE MG/DL
RBC # UR STRIP: NEGATIVE /UL
SP GR UR: 1.02 (ref 1–1.03)
UROBILINOGEN UR QL: NORMAL

## 2021-01-18 PROCEDURE — 99213 OFFICE O/P EST LOW 20 MIN: CPT | Performed by: OBSTETRICS & GYNECOLOGY

## 2021-01-18 PROCEDURE — 81025 URINE PREGNANCY TEST: CPT | Performed by: OBSTETRICS & GYNECOLOGY

## 2021-01-18 RX ORDER — DICYCLOMINE HCL 20 MG
20 TABLET ORAL
COMMUNITY
Start: 2021-01-15 | End: 2021-05-26

## 2021-01-18 NOTE — PROGRESS NOTES
"Postpartum Note    Chief Complaint: Postpartum Visit    HPI      Date of delivery: 12/16/20- Post op.   Pt with VB but nothing heavy.     Pt was dx with a suspected left PE 12/11/20. Pt is now on Eliquis. She saw Hematology 1/07/21. She is planned for labs to evaluate for a thrombophilia. Pt reports her maternal GM had a PE. She also has a maternal aunt with DVT. Pt was seen in the ER recently due to recurrent chest pain. CT scan reveals PE gone but bile duct prominent. Pt is scheduled for RUQ US tomorrow.     Breastfeeding: declines    Review of Systems   Constitutional: Negative for appetite change, chills, fatigue, fever and unexpected weight change.   Gastrointestinal: Negative for abdominal distention, abdominal pain, anal bleeding, blood in stool, constipation, diarrhea, nausea and vomiting.   Genitourinary: Negative for dyspareunia, dysuria, menstrual problem, pelvic pain, vaginal bleeding, vaginal discharge and vaginal pain.       The following portions of the patient's history were reviewed and updated as appropriate: allergies, current medications and problem list.             /86   Ht 175.3 cm (69\")   Wt 92.1 kg (203 lb)   Breastfeeding No   BMI 29.98 kg/m²       Physical Exam  Constitutional:       General: She is not in acute distress.     Appearance: Normal appearance. She is not toxic-appearing or diaphoretic.   Abdominal:      General: There is no distension.      Palpations: Abdomen is soft. There is no mass.      Tenderness: There is no abdominal tenderness.      Comments: Incision C/D/I   Neurological:      General: No focal deficit present.      Mental Status: She is alert and oriented to person, place, and time. Mental status is at baseline.      Cranial Nerves: No cranial nerve deficit.      Motor: No weakness.      Coordination: Coordination normal.      Gait: Gait normal.   Psychiatric:         Mood and Affect: Mood normal.         Behavior: Behavior normal.         Thought Content: " Thought content normal.         Judgment: Judgment normal.             29yo  s/p primary LTCS        1) POD#12/16/20: Progressing well.     2) Postpartum Care: Bottle feeding male infant. Pt denies any PP depression.    3) Gyn HM: LPS 2020    4) Contraception: Pt desires ParaGArd IUD- will order.    5) Hgb 11: Pt to continue iron daily    6) Suspected PE of left lung: pt is on Eliquis. Pt saw Hematology and planned for thrombophilia work up in approx 4 weeks. Pt reports today that her GM had a PE.  Pt aware that she should not get pregnant again while on Eliquis and will need Lovenox for any future pregnancy. FU 2 weeks.    7) A2GDM: Pt will need 6 week 2hr PP. Pt given 75 glucose today.          Prachi Kim,   2021  13:22 EST

## 2021-02-04 ENCOUNTER — CLINICAL SUPPORT (OUTPATIENT)
Dept: ONCOLOGY | Facility: HOSPITAL | Age: 31
End: 2021-02-04

## 2021-02-04 ENCOUNTER — LAB (OUTPATIENT)
Dept: OTHER | Facility: HOSPITAL | Age: 31
End: 2021-02-04

## 2021-02-04 ENCOUNTER — TELEPHONE (OUTPATIENT)
Dept: OBSTETRICS AND GYNECOLOGY | Facility: CLINIC | Age: 31
End: 2021-02-04

## 2021-02-04 VITALS
BODY MASS INDEX: 30.92 KG/M2 | WEIGHT: 204 LBS | RESPIRATION RATE: 18 BRPM | HEART RATE: 73 BPM | HEIGHT: 68 IN | SYSTOLIC BLOOD PRESSURE: 120 MMHG | TEMPERATURE: 97.3 F | OXYGEN SATURATION: 99 % | DIASTOLIC BLOOD PRESSURE: 84 MMHG

## 2021-02-04 DIAGNOSIS — I26.99 ACUTE PULMONARY EMBOLISM, UNSPECIFIED PULMONARY EMBOLISM TYPE, UNSPECIFIED WHETHER ACUTE COR PULMONALE PRESENT (HCC): Primary | ICD-10-CM

## 2021-02-04 LAB
BASOPHILS # BLD AUTO: 0.06 10*3/MM3 (ref 0–0.2)
BASOPHILS NFR BLD AUTO: 0.8 % (ref 0–1.5)
DEPRECATED RDW RBC AUTO: 48.2 FL (ref 37–54)
EOSINOPHIL # BLD AUTO: 0.21 10*3/MM3 (ref 0–0.4)
EOSINOPHIL NFR BLD AUTO: 2.7 % (ref 0.3–6.2)
ERYTHROCYTE [DISTWIDTH] IN BLOOD BY AUTOMATED COUNT: 16 % (ref 12.3–15.4)
HCT VFR BLD AUTO: 41.2 % (ref 34–46.6)
HGB BLD-MCNC: 13 G/DL (ref 12–15.9)
IMM GRANULOCYTES # BLD AUTO: 0.01 10*3/MM3 (ref 0–0.05)
IMM GRANULOCYTES NFR BLD AUTO: 0.1 % (ref 0–0.5)
LYMPHOCYTES # BLD AUTO: 3.29 10*3/MM3 (ref 0.7–3.1)
LYMPHOCYTES NFR BLD AUTO: 41.5 % (ref 19.6–45.3)
MCH RBC QN AUTO: 26.1 PG (ref 26.6–33)
MCHC RBC AUTO-ENTMCNC: 31.6 G/DL (ref 31.5–35.7)
MCV RBC AUTO: 82.7 FL (ref 79–97)
MONOCYTES # BLD AUTO: 0.53 10*3/MM3 (ref 0.1–0.9)
MONOCYTES NFR BLD AUTO: 6.7 % (ref 5–12)
NEUTROPHILS NFR BLD AUTO: 3.82 10*3/MM3 (ref 1.7–7)
NEUTROPHILS NFR BLD AUTO: 48.2 % (ref 42.7–76)
NRBC BLD AUTO-RTO: 0 /100 WBC (ref 0–0.2)
PLATELET # BLD AUTO: 389 10*3/MM3 (ref 140–450)
PMV BLD AUTO: 9.6 FL (ref 6–12)
RBC # BLD AUTO: 4.98 10*6/MM3 (ref 3.77–5.28)
WBC # BLD AUTO: 7.92 10*3/MM3 (ref 3.4–10.8)

## 2021-02-04 PROCEDURE — 85300 ANTITHROMBIN III ACTIVITY: CPT | Performed by: INTERNAL MEDICINE

## 2021-02-04 PROCEDURE — 86147 CARDIOLIPIN ANTIBODY EA IG: CPT | Performed by: INTERNAL MEDICINE

## 2021-02-04 PROCEDURE — G0463 HOSPITAL OUTPT CLINIC VISIT: HCPCS

## 2021-02-04 PROCEDURE — 85732 THROMBOPLASTIN TIME PARTIAL: CPT | Performed by: INTERNAL MEDICINE

## 2021-02-04 PROCEDURE — 85613 RUSSELL VIPER VENOM DILUTED: CPT | Performed by: INTERNAL MEDICINE

## 2021-02-04 PROCEDURE — 86146 BETA-2 GLYCOPROTEIN ANTIBODY: CPT | Performed by: INTERNAL MEDICINE

## 2021-02-04 PROCEDURE — 85306 CLOT INHIBIT PROT S FREE: CPT | Performed by: INTERNAL MEDICINE

## 2021-02-04 PROCEDURE — 81241 F5 GENE: CPT | Performed by: INTERNAL MEDICINE

## 2021-02-04 PROCEDURE — 85303 CLOT INHIBIT PROT C ACTIVITY: CPT | Performed by: INTERNAL MEDICINE

## 2021-02-04 PROCEDURE — 85025 COMPLETE CBC W/AUTO DIFF WBC: CPT | Performed by: NURSE PRACTITIONER

## 2021-02-04 PROCEDURE — 81240 F2 GENE: CPT | Performed by: INTERNAL MEDICINE

## 2021-02-04 PROCEDURE — 36415 COLL VENOUS BLD VENIPUNCTURE: CPT | Performed by: INTERNAL MEDICINE

## 2021-02-04 NOTE — NURSING NOTE
Pt is here for lab with RN review.  CBC reviewed with pt, counts are stable for this pt at this time. Pt has no complaints.  Copy of labs given to pt and f/u appt reviewed. Pt is instructed to call the office with any concerns or new symptoms prior to next visit. Pt vu\  Lab Results   Component Value Date    WBC 7.92 02/04/2021    HGB 13.0 02/04/2021    HCT 41.2 02/04/2021    MCV 82.7 02/04/2021     02/04/2021

## 2021-02-04 NOTE — TELEPHONE ENCOUNTER
Patient called stating that because she is still off work her employer is asking for a note stating that her return date will be reviewed at her next appointment , do you approve this?

## 2021-02-05 LAB
AT III PPP CHRO-ACNC: 109 % (ref 90–134)
F5 GENE MUT ANL BLD/T: NORMAL
FACTOR II, DNA ANALYSIS: NORMAL
PROT C ACT/NOR PPP: 154 % (ref 86–163)
PROT S ACT/NOR PPP: 80 % (ref 70–127)
PROT S FREE PPP-ACNC: 74 % (ref 49–138)

## 2021-02-06 LAB
CARDIOLIPIN IGG SER IA-ACNC: <9 GPL U/ML (ref 0–14)
CARDIOLIPIN IGM SER IA-ACNC: 10 MPL U/ML (ref 0–12)

## 2021-02-07 LAB
B2 GLYCOPROT1 IGA SER-ACNC: <9 GPI IGA UNITS (ref 0–25)
B2 GLYCOPROT1 IGG SER-ACNC: <9 GPI IGG UNITS (ref 0–20)
B2 GLYCOPROT1 IGM SER-ACNC: <9 GPI IGM UNITS (ref 0–32)

## 2021-02-08 LAB
DRVVT SCREEN TO CONFIRM RATIO: 1.2 RATIO (ref 0.8–1.2)
LA 2 SCREEN W REFLEX-IMP: ABNORMAL
MIXING DRVVT: 41.1 SEC (ref 0–40.4)
SCREEN APTT: 37.1 SEC (ref 0–51.9)
SCREEN DRVVT: 49.5 SEC (ref 0–47)

## 2021-02-16 ENCOUNTER — TELEPHONE (OUTPATIENT)
Dept: ONCOLOGY | Facility: CLINIC | Age: 31
End: 2021-02-16

## 2021-02-16 NOTE — TELEPHONE ENCOUNTER
Caller: allison    Relationship to patient: self    Best call back number: [phone]    Pt would like the results of the lab done on 2/4/21.

## 2021-02-16 NOTE — TELEPHONE ENCOUNTER
Caller: allison    Relationship to patient: self    Best call back number: [phone]    Pt would like to resched her follow up on 2/18/21 to a telephone or video visit due to the weather. Pt would also like to cancel her vitals appt due to the weather and not resched.

## 2021-02-16 NOTE — TELEPHONE ENCOUNTER
Caller: TAMMIE ALICEA    Relationship to patient: SELF    Best call back number: [phone]     PT MISSED A CALL FROM JOAN CAO REGARDING HER LAB RESULTS. PLEASE CALL BACK

## 2021-02-18 ENCOUNTER — TELEMEDICINE (OUTPATIENT)
Dept: ONCOLOGY | Facility: CLINIC | Age: 31
End: 2021-02-18

## 2021-02-18 ENCOUNTER — APPOINTMENT (OUTPATIENT)
Dept: OTHER | Facility: HOSPITAL | Age: 31
End: 2021-02-18

## 2021-02-18 DIAGNOSIS — I26.99 ACUTE PULMONARY EMBOLISM, UNSPECIFIED PULMONARY EMBOLISM TYPE, UNSPECIFIED WHETHER ACUTE COR PULMONALE PRESENT (HCC): Primary | ICD-10-CM

## 2021-02-18 RX ORDER — SUCRALFATE 1 G/1
1 TABLET ORAL 4 TIMES DAILY
Qty: 360 TABLET | Refills: 1 | OUTPATIENT
Start: 2021-02-18

## 2021-02-25 ENCOUNTER — TELEPHONE (OUTPATIENT)
Dept: ONCOLOGY | Facility: CLINIC | Age: 31
End: 2021-02-25

## 2021-02-25 ENCOUNTER — TELEPHONE (OUTPATIENT)
Dept: OBSTETRICS AND GYNECOLOGY | Facility: CLINIC | Age: 31
End: 2021-02-25

## 2021-02-25 NOTE — TELEPHONE ENCOUNTER
Caller: allison    Relationship to patient: self    Best call back number: [phone]     Pt states that Dr. Jhaveri will be needing a note stating that dr. tello okays the procedure that will be done.   Pt also states that she will need a prescription sent out for an injection form of LOVENOX to aid with the procedure.

## 2021-02-25 NOTE — TELEPHONE ENCOUNTER
CALLER: TAMMIE    REASON:    PT RETURNING MISSED CALL REGARDING DR NOTE THAT IS NEEDED.      BEST C/B: [phone]

## 2021-02-25 NOTE — TELEPHONE ENCOUNTER
Patient calling states her employer needs a note as to why she is off until March 12th. She had an appointment on 2/15, but due to weather had to reschedule. Can we provide her with a note or let her go back to work without being seen until then?

## 2021-02-25 NOTE — TELEPHONE ENCOUNTER
Call rec'd from pt. She is scheduled on Tuesday to have her gallbladder removed.  Per Dr Cabrera, pt should hold her eliquis two days prior to the procedure.  We will bridge with lovenox and this will be discontinued the evening prior to the procedure.  I reviewed all of this in detail with Es. She states she feels comfortable giving herself the injections, and she states her  is an EMT and feels comfortable giving her the injections as well.  Letter sent to Dr Osborne's office clearing her for procedure.  Lovenox sent to pt pharmacy.  Asked that she call if she should have any questions.

## 2021-02-25 NOTE — TELEPHONE ENCOUNTER
Why is she not being seen til 3/12?  She needs to be seen before this. This is a long time to write being off

## 2021-03-17 ENCOUNTER — POSTPARTUM VISIT (OUTPATIENT)
Dept: OBSTETRICS AND GYNECOLOGY | Facility: CLINIC | Age: 31
End: 2021-03-17

## 2021-03-17 VITALS
HEIGHT: 68 IN | SYSTOLIC BLOOD PRESSURE: 128 MMHG | DIASTOLIC BLOOD PRESSURE: 80 MMHG | BODY MASS INDEX: 31.34 KG/M2 | WEIGHT: 206.8 LBS

## 2021-03-17 DIAGNOSIS — Z13.9 SCREENING FOR CONDITION: ICD-10-CM

## 2021-03-17 DIAGNOSIS — Z98.891 S/P CESAREAN SECTION: ICD-10-CM

## 2021-03-17 DIAGNOSIS — I26.99 OTHER ACUTE PULMONARY EMBOLISM, UNSPECIFIED WHETHER ACUTE COR PULMONALE PRESENT (HCC): ICD-10-CM

## 2021-03-17 DIAGNOSIS — Z30.430 ENCOUNTER FOR IUD INSERTION: Primary | ICD-10-CM

## 2021-03-17 DIAGNOSIS — O10.011 PRE-EXISTING ESSENTIAL HYPERTENSION DURING PREGNANCY IN FIRST TRIMESTER: ICD-10-CM

## 2021-03-17 LAB
B-HCG UR QL: NEGATIVE
BILIRUB BLD-MCNC: NEGATIVE MG/DL
CLARITY, POC: CLEAR
COLOR UR: YELLOW
GLUCOSE UR STRIP-MCNC: NEGATIVE MG/DL
INTERNAL NEGATIVE CONTROL: NEGATIVE
INTERNAL POSITIVE CONTROL: POSITIVE
KETONES UR QL: NEGATIVE
LEUKOCYTE EST, POC: NEGATIVE
Lab: 55
NITRITE UR-MCNC: NEGATIVE MG/ML
PH UR: 5 [PH] (ref 5–8)
PROT UR STRIP-MCNC: NEGATIVE MG/DL
RBC # UR STRIP: NEGATIVE /UL
SP GR UR: 1 (ref 1–1.03)
UROBILINOGEN UR QL: NORMAL

## 2021-03-17 PROCEDURE — 81025 URINE PREGNANCY TEST: CPT | Performed by: OBSTETRICS & GYNECOLOGY

## 2021-03-17 PROCEDURE — 58300 INSERT INTRAUTERINE DEVICE: CPT | Performed by: OBSTETRICS & GYNECOLOGY

## 2021-03-17 PROCEDURE — 99213 OFFICE O/P EST LOW 20 MIN: CPT | Performed by: OBSTETRICS & GYNECOLOGY

## 2021-03-17 PROCEDURE — 81002 URINALYSIS NONAUTO W/O SCOPE: CPT | Performed by: OBSTETRICS & GYNECOLOGY

## 2021-03-17 RX ORDER — COPPER 313.4 MG/1
INTRAUTERINE DEVICE INTRAUTERINE ONCE
Status: COMPLETED | OUTPATIENT
Start: 2021-03-17 | End: 2021-03-17

## 2021-03-17 RX ORDER — APIXABAN 5 MG/1
5 TABLET, FILM COATED ORAL 2 TIMES DAILY
COMMUNITY
Start: 2021-03-03 | End: 2021-08-04

## 2021-03-17 RX ADMIN — COPPER: 313.4 INTRAUTERINE DEVICE INTRAUTERINE at 15:40

## 2021-03-17 NOTE — PROGRESS NOTES
"FINAL POSTPARTUM VISIT.    S:  Here for final pp postop check.  PP depression?  yes.  Breast or bottle?  bottle.  Contraception?  Needs non-hormonal IUD.      O:  /80   Ht 172.7 cm (67.99\")   Wt 93.8 kg (206 lb 12.8 oz)   LMP 02/21/2021 (Exact Date)   Breastfeeding No   BMI 31.45 kg/m²     Exam: inc ok.  Pelvic exam wnl.  No pap.      Procedure: Intrauterine device insertion    Chief Complaint: IUD insertion    Procedures    Pre procedure indication 1) Desires ParaGard  Post procedure indication 1) Desires ParaGard    The risks, benefits, and alternatives to IUD were explained at length with the patient. All her questions were answered and consents were signed.    The patient was placed in a dorsal lithotomy position on the examining table in [facility]. A bimanual exam confirmed the uterus was normal in size, anteverted. A warmed metal speculum was inserted into the vagina and the cervix was brought into view.    The cervix was prepped with Betadine. The anterior lip was grasped with a single-tooth tenaculum. The endometrial cavity was then sounded to 7 cm without use of a dilator. The IUD was removed in a sterile fashion.    The  was then carefully advanced to the cervical canal into the uterus to the level of the fundus. This was then backed off about 1.5-2 cm to allow sufficient space for the arms to open. The device was deployed. The  was removed carefully from the uterus. The threads were then cut leaving 2-3 cm visible outside of the cervix.  The single-tooth tenaculum was removed from the anterior lip. Good hemostasis was noted.     All other instruments were removed from the vagina.   There were no complications.  The patient tolerated the procedure well with a minimal amount of discomfort.    The patient was counseled about the need to return in 4 weeks for string check.     She was counseled about the need to use a backup method of contraception such as condoms for 1-2 weeks. The " patient is counseled to contact us if she has any significant or increasing bleeding, pain, fever, chills, or other concerns. She is instructed to see a doctor right away if she believes that she may be pregnant at any time with the IUD in place.    Diagnoses and all orders for this visit:    1. Screening for condition (Primary)  -     POC Urinalysis Dipstick  -     POC Pregnancy, Urine    Other orders  -     sertraline (Zoloft) 50 MG tablet; Take 1 tablet by mouth Daily.  Dispense: 30 tablet; Refill: 2      A:  Pp depression, hx PE, IUD inserted    P:  RTO 4 weeks for string check and pp depression check    Levon Marcos MD  15:13 EDT  03/17/21

## 2021-04-13 ENCOUNTER — TELEPHONE (OUTPATIENT)
Dept: ONCOLOGY | Facility: CLINIC | Age: 31
End: 2021-04-13

## 2021-04-13 NOTE — TELEPHONE ENCOUNTER
Caller: TAMMIE ALICEA    Relationship to patient: SELF    Best call back number: [phone]    PT STATES SHE RECEIVED THE RAQUEL AND RAQUEL COVID VACCINE AND SHE HAS A HISTORY OF BLOOD CLOTS. PT IS CALLING TO ASK IF THERE IS ANYTHING DR FORTUNE FEELS THE PT SHOULD DO, AS THE RAQUEL AND RAQUEL VACCINE WAS PAUSED DO TO BLOOD CLOTS. PLEASE ADVISE

## 2021-04-13 NOTE — TELEPHONE ENCOUNTER
Attempted to return call to pt, went straight to voicemail. Left message on voicemail stating that pt does not need to do anything differently, that she is covered since she is already on eliquis, however if she develops s/s on a clot she should go to the ER.

## 2021-04-17 ENCOUNTER — APPOINTMENT (OUTPATIENT)
Dept: GENERAL RADIOLOGY | Facility: HOSPITAL | Age: 31
End: 2021-04-17

## 2021-04-17 ENCOUNTER — HOSPITAL ENCOUNTER (EMERGENCY)
Facility: HOSPITAL | Age: 31
Discharge: HOME | End: 2021-04-17
Attending: EMERGENCY MEDICINE | Admitting: EMERGENCY MEDICINE

## 2021-04-17 VITALS
WEIGHT: 205 LBS | RESPIRATION RATE: 16 BRPM | BODY MASS INDEX: 30.36 KG/M2 | DIASTOLIC BLOOD PRESSURE: 86 MMHG | OXYGEN SATURATION: 98 % | TEMPERATURE: 98.9 F | HEART RATE: 89 BPM | HEIGHT: 69 IN | SYSTOLIC BLOOD PRESSURE: 126 MMHG

## 2021-04-17 DIAGNOSIS — R03.0 ELEVATED BLOOD PRESSURE READING: ICD-10-CM

## 2021-04-17 DIAGNOSIS — S60.032A CONTUSION OF LEFT MIDDLE FINGER WITHOUT DAMAGE TO NAIL, INITIAL ENCOUNTER: Primary | ICD-10-CM

## 2021-04-17 PROCEDURE — 99282 EMERGENCY DEPT VISIT SF MDM: CPT

## 2021-04-17 PROCEDURE — 73140 X-RAY EXAM OF FINGER(S): CPT

## 2021-04-17 NOTE — ED PROVIDER NOTES
EMERGENCY DEPARTMENT ENCOUNTER      Room Number:     History is provided by the patient, no translation services needed    HPI:    Chief complaint: Finger injury    Location: Left middle finger    Quality/Severity: Aching/5 out of 10    Timing/Duration: Just prior to arrival/constant    Modifying Factors: Worse with movement    Associated Symptoms: No other injuries    Narrative: Pt is a 31 y.o. female who presents complaining of left middle finger pain after an injury sustained at work today.  Patient states that she got her finger stuck in a wheelchair.  Patient denies any other injuries.  Patient states that her pain is currently a 5 out of 10.      PMD: Rosetta Adams MD    REVIEW OF SYSTEMS  Review of Systems   Musculoskeletal: Positive for arthralgias and myalgias.   Skin: Negative for color change and wound.         PAST MEDICAL HISTORY  Active Ambulatory Problems     Diagnosis Date Noted   • Pregnancy 2020   • CHTN- labetalol 50 mg QD, baseline 24 hour urine- 80 mg 2020   • Rubella non-immune status, antepartum- MMR pp 2020   • PCOS (polycystic ovarian syndrome) 2020   • Gestational diabetes mellitus, class A2: glyburide 2020   • Solitary cyst of right breast: 5.7cm. Scheduled for drainage 2020   • Gastroesophageal reflux disease with esophagitis 2020   • Sacroiliac pain during pregnancy 10/16/2020   • S/P  section 2020   • Acute pulmonary embolism (CMS/[facility]) 2021   • Encounter for IUD insertion: Paragard 3/17/21 2021     Resolved Ambulatory Problems     Diagnosis Date Noted   • Ectopic pregnancy 2020   • S/P laparoscopy 2020   • Pelvic pain 2020   • Blood type, Rh positive 2020   • Elevated glycosylated hemoglobin- HgBA1c= 5.7, needs early 2 hour GTT at 20  & 28 weeks 2020   • Gestational diabetes 10/10/2020   • Normal labor and delivery 12/15/2020     Past Medical History:   Diagnosis Date   • Anemia     • Asthma    • Female infertility    • Headache    • Hypertension    • Polycystic ovary syndrome        PAST SURGICAL HISTORY  Past Surgical History:   Procedure Laterality Date   • ADENOIDECTOMY     •  SECTION N/A 2020    Procedure:  SECTION PRIMARY;  Surgeon: Prachi Kim DO;  Location: [facility] LABOR DELIVERY;  Service: Obstetrics/Gynecology;  Laterality: N/A;   • DIAGNOSTIC LAPAROSCOPY N/A 2020    Procedure: DIAGNOSTIC LAPAROSCOPY;  Surgeon: Imelda Rayo MD;  Location: [facility] OR;  Service: Obstetrics/Gynecology;  Laterality: N/A;   • TONSILLECTOMY  2008    adnoids also   • US GUIDED CYST ASPIRATION BREAST N/A 2020       FAMILY HISTORY  Family History   Problem Relation Age of Onset   • Hypertension Mother    • Cancer Mother    • Hypertension Maternal Grandmother    • Diabetes Maternal Grandfather    • Hypertension Paternal Grandmother    • Diabetes Paternal Grandfather    • Deep vein thrombosis Maternal Aunt    • Breast cancer Neg Hx    • Colon cancer Neg Hx    • Pulmonary embolism Neg Hx    • Anesthesia problems Neg Hx        SOCIAL HISTORY  Social History     Socioeconomic History   • Marital status:      Spouse name: Not on file   • Number of children: Not on file   • Years of education: Not on file   • Highest education level: Not on file   Tobacco Use   • Smoking status: Current Every Day Smoker     Packs/day: 0.25     Types: Cigarettes     Last attempt to quit: 2020     Years since quittin.8   • Smokeless tobacco: Never Used   Vaping Use   • Vaping Use: Never used   Substance and Sexual Activity   • Alcohol use: Not Currently   • Drug use: No   • Sexual activity: Yes     Partners: Male     Birth control/protection: None       ALLERGIES  Patient has no known allergies.    No current facility-administered medications for this encounter.    Current Outpatient Medications:   •  dicyclomine (BENTYL) 20 MG tablet, Take 20 mg by mouth., Disp: ,  Rfl:   •  docusate sodium 100 MG capsule, Take 1 capsule by mouth 2 (Two) Times a Day As Needed for Constipation., Disp: 30 capsule, Rfl: 0  •  Eliquis 5 MG tablet tablet, Take 5 mg by mouth 2 (Two) Times a Day., Disp: , Rfl:   •  ferrous gluconate (FERGON) 324 MG tablet, Take 1 tablet by mouth Daily With Breakfast. (Patient taking differently: Take 324 mg by mouth 3 (Three) Times a Day.), Disp: 30 tablet, Rfl: 6  •  sertraline (Zoloft) 50 MG tablet, Take 1 tablet by mouth Daily., Disp: 30 tablet, Rfl: 2    PHYSICAL EXAM  ED Triage Vitals [04/17/21 1350]   Temp Heart Rate Resp BP SpO2   98.9 °F (37.2 °C) 89 16 (!) 162/101 98 %      Temp src Heart Rate Source Patient Position BP Location FiO2 (%)   -- -- -- -- --       Physical Exam  Vitals and nursing note reviewed.   HENT:      Head: Normocephalic and atraumatic.   Eyes:      Conjunctiva/sclera: Conjunctivae normal.   Cardiovascular:      Rate and Rhythm: Normal rate and regular rhythm.      Heart sounds: Normal heart sounds.   Pulmonary:      Effort: Pulmonary effort is normal. No respiratory distress.      Breath sounds: Normal breath sounds.   Abdominal:      General: Bowel sounds are normal. There is no distension.      Palpations: Abdomen is soft.      Tenderness: There is no abdominal tenderness.   Musculoskeletal:         General: Normal range of motion.      Cervical back: Normal range of motion and neck supple.   Skin:     General: Skin is warm and dry.   Neurological:      Mental Status: She is alert and oriented to person, place, and time.   Psychiatric:         Mood and Affect: Mood and affect normal.           LAB RESULTS  Lab Results (last 24 hours)     ** No results found for the last 24 hours. **                RADIOLOGY  XR Finger 2+ View Left    Result Date: 4/17/2021  CR Fingers Min 2 Vws LT INDICATION: Crush injury to the third finger earlier today COMPARISON: None available FINDINGS: 3 views of the left third finger.  No fracture or  dislocation.  No bone erosion or destruction.  No foreign body.     Negative left third finger Signer Name: Franko Urban MD  Signed: 4/17/2021 2:37 PM  Workstation Name: [email or web address]  [facility]      I ordered the above radiologic testing and reviewed the results    PROCEDURES  Procedures      PROGRESS AND CONSULTS  ED Course as of Apr 17 1501   Sat Apr 17, 2021   2350 31-year-old female presents to ED with complaints of of left finger injury sustained prior to arrival.  Patient dates that she got her left finger stuck in a wheelchair.  After history and physical exam patient does have tenderness per palpation to the distal aspect of her left middle finger.  Patient's finger was x-rayed to rule out fracture or dislocation.  X-ray showed no fracture or dislocation.  Discussed results with patient.  Discussed patient that she would need to follow-up with her PCP.  Discussed with patient that she can use Motrin or Tylenol as needed for pain.  Also instructed patient she had any worsening symptoms to return to the ED.  Patient verbalized understanding of plan of care.    [GT]      ED Course User Index  [GT] Karine De Paz, VERONICA           MEDICAL DECISION MAKING    MDM       DIAGNOSIS  Final diagnoses:   Contusion of left middle finger without damage to nail, initial encounter   Elevated blood pressure reading       Latest Documented Vital Signs:  As of 15:01 EDT  BP- 126/86 HR- 89 Temp- 98.9 °F (37.2 °C) O2 sat- 98%    DISPOSITION  Discharged home        Discussed pertinent findings with the patient/family.  Patient/Family voiced understanding of need to follow-up for recheck and further testing as needed.  Return to the Emergency Department warnings were given.         Medication List      Changed    ferrous gluconate 324 MG tablet  Commonly known as: FERGON  Take 1 tablet by mouth Daily With Breakfast.  What changed: when to take this                Follow-up Information     Rosetta Adams  MD PARVEZ. Call in 1 day.    Specialty: Family Medicine  Why: To schedule a follow up appointment  Contact information:  [address] [facility] [address] [phone]                     Dictated utilizing Dragon dictation     Karine De Paz PA-C  04/17/21 1507

## 2021-04-20 ENCOUNTER — POSTPARTUM VISIT (OUTPATIENT)
Dept: OBSTETRICS AND GYNECOLOGY | Facility: CLINIC | Age: 31
End: 2021-04-20

## 2021-04-20 VITALS
BODY MASS INDEX: 31.07 KG/M2 | DIASTOLIC BLOOD PRESSURE: 80 MMHG | SYSTOLIC BLOOD PRESSURE: 126 MMHG | WEIGHT: 209.8 LBS | HEIGHT: 69 IN

## 2021-04-20 DIAGNOSIS — Z30.431 IUD CHECK UP: ICD-10-CM

## 2021-04-20 DIAGNOSIS — O10.011 PRE-EXISTING ESSENTIAL HYPERTENSION DURING PREGNANCY IN FIRST TRIMESTER: ICD-10-CM

## 2021-04-20 DIAGNOSIS — Z13.9 SCREENING FOR CONDITION: Primary | ICD-10-CM

## 2021-04-20 LAB
B-HCG UR QL: NEGATIVE
BILIRUB BLD-MCNC: NEGATIVE MG/DL
CLARITY, POC: CLEAR
COLOR UR: YELLOW
GLUCOSE UR STRIP-MCNC: NEGATIVE MG/DL
INTERNAL NEGATIVE CONTROL: NEGATIVE
INTERNAL POSITIVE CONTROL: POSITIVE
KETONES UR QL: NEGATIVE
LEUKOCYTE EST, POC: NEGATIVE
Lab: 55
NITRITE UR-MCNC: NEGATIVE MG/ML
PH UR: 5 [PH] (ref 5–8)
PROT UR STRIP-MCNC: NEGATIVE MG/DL
RBC # UR STRIP: NEGATIVE /UL
SP GR UR: 1.01 (ref 1–1.03)
UROBILINOGEN UR QL: NORMAL

## 2021-04-20 PROCEDURE — 81025 URINE PREGNANCY TEST: CPT | Performed by: OBSTETRICS & GYNECOLOGY

## 2021-04-20 PROCEDURE — 99213 OFFICE O/P EST LOW 20 MIN: CPT | Performed by: OBSTETRICS & GYNECOLOGY

## 2021-04-20 NOTE — PROGRESS NOTES
"EVALUATION AND MANAGEMENT ENCOUNTER    Es Branch  Patient new to examiner? No  New problem to examiner? Yes  Patient referred? No    -----------------------------------------------------HISTORY---------------------------------------------------    Chief Complaint:   Chief Complaint   Patient presents with   • Follow-up     IUD string check        HPI:  Es Branch is a 31 y.o.  with No LMP recorded. here for IUD string check.    Pt denies:  Any problems.  Periods are heavy and pt wonders if her zoloft is working or not.  Pt is not suicidal.    History of Present Illness     Es Branch  reports that she has been smoking cigarettes. She has been smoking about 0.25 packs per day. She has never used smokeless tobacco.. I have educated her on the risk of diseases from using tobacco products such as cancer, COPD and heart disease.     I advised her to quit and she is not willing to quit.             ROS:  Review of Systems   Constitutional: Negative.    HENT: Negative.    Eyes: Negative.    Respiratory: Negative.    Cardiovascular: Negative.    Gastrointestinal: Negative.    Endocrine: Negative.    Genitourinary: Positive for vaginal bleeding.   Musculoskeletal: Negative.    Skin: Negative.    Allergic/Immunologic: Negative.    Neurological: Negative.    Hematological: Negative.    Psychiatric/Behavioral: Positive for dysphoric mood.       Patient reports that she is not currently experiencing any symptoms of urinary incontinence.      noTESTED FOR CHLAMYDIA?  -----------------------------------------------PHYSICAL EXAM----------------------------------------------    Vital Signs: /80   Ht 175.3 cm (69.02\")   Wt 95.2 kg (209 lb 12.8 oz)   BMI 30.97 kg/m²      Physical Exam  Vitals and nursing note reviewed.   Constitutional:       Appearance: She is well-developed.   HENT:      Head: Normocephalic and atraumatic.   Cardiovascular:      Rate and Rhythm: Normal rate.   Pulmonary:      Effort: " Pulmonary effort is normal.   Abdominal:      General: There is no distension.      Palpations: Abdomen is soft. There is no mass.      Tenderness: There is no abdominal tenderness. There is no guarding.   Genitourinary:     General: Normal vulva.      Vagina: No vaginal discharge.            Comments: IUD string seen  Musculoskeletal:         General: No tenderness or deformity. Normal range of motion.      Cervical back: Normal range of motion.   Skin:     General: Skin is warm and dry.      Coloration: Skin is not pale.      Findings: No erythema or rash.   Neurological:      Mental Status: She is alert and oriented to person, place, and time.   Psychiatric:         Behavior: Behavior normal.         Thought Content: Thought content normal.         Judgment: Judgment normal.         -----------------------------------------------MEDICAL DECISION MAKING-----------------------------        DATA Review & labs ordered:     1.   Lab Results (last 24 hours)     ** No results found for the last 24 hours. **        2.   Imaging Results (Last 24 Hours)     ** No results found for the last 24 hours. **        3.   ECG/EMG Results (most recent)     None              Diagnoses and all orders for this visit:    1. Screening for condition (Primary)  -     POC Pregnancy, Urine  -     POC Urinalysis Dipstick    2. CHTN- labetalol 50 mg QD, baseline 24 hour urine- 80 mg    3. Postpartum depression    4. IUD check up        IMPRESSION/PROBLEM:      IUD check wnl.    Postpartum depression still a concern, but not worse.    (Established problem/s? Yes, worsening? No)    (New Problem/s? Yes, additional workup planned? No)      PLAN:     1. RTO 1 yr  2. Call if depression not improved by 2 weeks, at which time we will double her zoloft dose.    Pt to call for any results from testing promptly if she does not hear from us.     RTO Return in about 1 year (around 4/20/2022) for Annual physical. .  Instructions and precautions given.      TIME: More than 50% of time spent in counseling and/or coordination of care.Time spent in counseling 30 min.  Counseling included the following topics postpartum depression management with prognosis, differential diagnosis, risks, benefits of treatment, instructions, compliance and/or risk reduction and alternatives.       Levon Marcos MD  09:10 EDT  04/20/21

## 2021-05-26 ENCOUNTER — TELEPHONE (OUTPATIENT)
Dept: OBSTETRICS AND GYNECOLOGY | Facility: CLINIC | Age: 31
End: 2021-05-26

## 2021-05-26 ENCOUNTER — HOSPITAL ENCOUNTER (EMERGENCY)
Facility: HOSPITAL | Age: 31
Discharge: HOME | End: 2021-05-26
Attending: EMERGENCY MEDICINE | Admitting: EMERGENCY MEDICINE

## 2021-05-26 VITALS
SYSTOLIC BLOOD PRESSURE: 134 MMHG | HEART RATE: 99 BPM | HEIGHT: 68 IN | DIASTOLIC BLOOD PRESSURE: 100 MMHG | OXYGEN SATURATION: 98 % | RESPIRATION RATE: 18 BRPM | TEMPERATURE: 99 F | BODY MASS INDEX: 32.96 KG/M2 | WEIGHT: 217.5 LBS

## 2021-05-26 DIAGNOSIS — R10.2 FEMALE PELVIC PAIN: Primary | ICD-10-CM

## 2021-05-26 LAB
ALBUMIN SERPL-MCNC: 4.6 G/DL (ref 3.5–5.2)
ALBUMIN/GLOB SERPL: 1.8 G/DL
ALP SERPL-CCNC: 70 U/L (ref 39–117)
ALT SERPL W P-5'-P-CCNC: 13 U/L (ref 1–33)
ANION GAP SERPL CALCULATED.3IONS-SCNC: 13 MMOL/L (ref 5–15)
AST SERPL-CCNC: 16 U/L (ref 1–32)
B-HCG UR QL: NEGATIVE
BASOPHILS # BLD AUTO: 0.04 10*3/MM3 (ref 0–0.2)
BASOPHILS NFR BLD AUTO: 0.6 % (ref 0–1.5)
BILIRUB SERPL-MCNC: 0.2 MG/DL (ref 0–1.2)
BILIRUB UR QL STRIP: NEGATIVE
BUN SERPL-MCNC: 9 MG/DL (ref 6–20)
BUN/CREAT SERPL: 10.7 (ref 7–25)
CALCIUM SPEC-SCNC: 9.9 MG/DL (ref 8.6–10.5)
CHLORIDE SERPL-SCNC: 104 MMOL/L (ref 98–107)
CLARITY UR: CLEAR
CLUE CELLS SPEC QL WET PREP: ABNORMAL
CO2 SERPL-SCNC: 22 MMOL/L (ref 22–29)
COLOR UR: YELLOW
CREAT SERPL-MCNC: 0.84 MG/DL (ref 0.57–1)
DEPRECATED RDW RBC AUTO: 48.2 FL (ref 37–54)
EOSINOPHIL # BLD AUTO: 0.15 10*3/MM3 (ref 0–0.4)
EOSINOPHIL NFR BLD AUTO: 2.2 % (ref 0.3–6.2)
ERYTHROCYTE [DISTWIDTH] IN BLOOD BY AUTOMATED COUNT: 15.2 % (ref 12.3–15.4)
GFR SERPL CREATININE-BSD FRML MDRD: 79 ML/MIN/1.73
GLOBULIN UR ELPH-MCNC: 2.5 GM/DL
GLUCOSE SERPL-MCNC: 126 MG/DL (ref 65–99)
GLUCOSE UR STRIP-MCNC: NEGATIVE MG/DL
HCT VFR BLD AUTO: 42.3 % (ref 34–46.6)
HGB BLD-MCNC: 13.5 G/DL (ref 12–15.9)
HGB UR QL STRIP.AUTO: NEGATIVE
HYDATID CYST SPEC WET PREP: ABNORMAL
IMM GRANULOCYTES # BLD AUTO: 0.01 10*3/MM3 (ref 0–0.05)
IMM GRANULOCYTES NFR BLD AUTO: 0.1 % (ref 0–0.5)
KETONES UR QL STRIP: NEGATIVE
LEUKOCYTE ESTERASE UR QL STRIP.AUTO: NEGATIVE
LIPASE SERPL-CCNC: 19 U/L (ref 13–60)
LYMPHOCYTES # BLD AUTO: 2.78 10*3/MM3 (ref 0.7–3.1)
LYMPHOCYTES NFR BLD AUTO: 41 % (ref 19.6–45.3)
MCH RBC QN AUTO: 27.3 PG (ref 26.6–33)
MCHC RBC AUTO-ENTMCNC: 31.9 G/DL (ref 31.5–35.7)
MCV RBC AUTO: 85.6 FL (ref 79–97)
MONOCYTES # BLD AUTO: 0.5 10*3/MM3 (ref 0.1–0.9)
MONOCYTES NFR BLD AUTO: 7.4 % (ref 5–12)
NEUTROPHILS NFR BLD AUTO: 3.3 10*3/MM3 (ref 1.7–7)
NEUTROPHILS NFR BLD AUTO: 48.7 % (ref 42.7–76)
NITRITE UR QL STRIP: NEGATIVE
PH UR STRIP.AUTO: 5.5 [PH] (ref 4.5–8)
PLATELET # BLD AUTO: 350 10*3/MM3 (ref 140–450)
PMV BLD AUTO: 9.7 FL (ref 6–12)
POTASSIUM SERPL-SCNC: 3.9 MMOL/L (ref 3.5–5.2)
PROT SERPL-MCNC: 7.1 G/DL (ref 6–8.5)
PROT UR QL STRIP: ABNORMAL
RBC # BLD AUTO: 4.94 10*6/MM3 (ref 3.77–5.28)
SODIUM SERPL-SCNC: 139 MMOL/L (ref 136–145)
SP GR UR STRIP: 1.02 (ref 1–1.03)
T VAGINALIS SPEC QL WET PREP: ABNORMAL
UROBILINOGEN UR QL STRIP: ABNORMAL
WBC # BLD AUTO: 6.78 10*3/MM3 (ref 3.4–10.8)
WBC SPEC QL WET PREP: ABNORMAL
YEAST GENITAL QL WET PREP: ABNORMAL

## 2021-05-26 PROCEDURE — 83690 ASSAY OF LIPASE: CPT | Performed by: EMERGENCY MEDICINE

## 2021-05-26 PROCEDURE — 81003 URINALYSIS AUTO W/O SCOPE: CPT | Performed by: EMERGENCY MEDICINE

## 2021-05-26 PROCEDURE — 81025 URINE PREGNANCY TEST: CPT | Performed by: EMERGENCY MEDICINE

## 2021-05-26 PROCEDURE — 85025 COMPLETE CBC W/AUTO DIFF WBC: CPT | Performed by: EMERGENCY MEDICINE

## 2021-05-26 PROCEDURE — 87491 CHLMYD TRACH DNA AMP PROBE: CPT | Performed by: EMERGENCY MEDICINE

## 2021-05-26 PROCEDURE — 80053 COMPREHEN METABOLIC PANEL: CPT | Performed by: EMERGENCY MEDICINE

## 2021-05-26 PROCEDURE — 87591 N.GONORRHOEAE DNA AMP PROB: CPT | Performed by: EMERGENCY MEDICINE

## 2021-05-26 PROCEDURE — 99284 EMERGENCY DEPT VISIT MOD MDM: CPT

## 2021-05-26 PROCEDURE — 87210 SMEAR WET MOUNT SALINE/INK: CPT | Performed by: EMERGENCY MEDICINE

## 2021-05-26 PROCEDURE — 99283 EMERGENCY DEPT VISIT LOW MDM: CPT | Performed by: EMERGENCY MEDICINE

## 2021-05-26 RX ORDER — SODIUM CHLORIDE 0.9 % (FLUSH) 0.9 %
10 SYRINGE (ML) INJECTION AS NEEDED
Status: DISCONTINUED | OUTPATIENT
Start: 2021-05-26 | End: 2021-05-26 | Stop reason: HOSPADM

## 2021-05-26 NOTE — TELEPHONE ENCOUNTER
PT HAD PARAGARD INSERTED 3/17/21 AND FOR THE LAST 2 WEEKS THE PT HAS BEEN EXPERIENCING PAIN SHE SHE SITS, LAYS OR MOVES A CERTAIN WAY AND ITS CAUSING HER CONCERN AND WOULD LIKE TO HAVE THE IUD CHECKED OUT, I SPOKE VERBALLY WITH DR. RCEWS WHO ADVISED AN ULTRASOUND, DUE TO ULTRASOUND SCHEDULE AND HOLIDAY PT IS SCHEDULED WITH KYM Thursday. PT WAS LSO ADVISED THAT IF THE PAIN BECOMES UNBEARABLE TO GO TO LOCAL ER.

## 2021-05-27 ENCOUNTER — OFFICE VISIT (OUTPATIENT)
Dept: OBSTETRICS AND GYNECOLOGY | Facility: CLINIC | Age: 31
End: 2021-05-27

## 2021-05-27 ENCOUNTER — TELEPHONE (OUTPATIENT)
Dept: OBSTETRICS AND GYNECOLOGY | Facility: CLINIC | Age: 31
End: 2021-05-27

## 2021-05-27 VITALS
HEIGHT: 68 IN | SYSTOLIC BLOOD PRESSURE: 130 MMHG | WEIGHT: 216 LBS | DIASTOLIC BLOOD PRESSURE: 82 MMHG | BODY MASS INDEX: 32.74 KG/M2

## 2021-05-27 DIAGNOSIS — Z13.89 SCREENING FOR GENITOURINARY CONDITION: ICD-10-CM

## 2021-05-27 DIAGNOSIS — Z30.432 ENCOUNTER FOR IUD REMOVAL: Primary | ICD-10-CM

## 2021-05-27 DIAGNOSIS — Z30.09 ENCOUNTER FOR COUNSELING REGARDING CONTRACEPTION: ICD-10-CM

## 2021-05-27 DIAGNOSIS — O10.011 PRE-EXISTING ESSENTIAL HYPERTENSION DURING PREGNANCY IN FIRST TRIMESTER: ICD-10-CM

## 2021-05-27 DIAGNOSIS — T83.32XA MALPOSITIONED INTRAUTERINE DEVICE (IUD), INITIAL ENCOUNTER: ICD-10-CM

## 2021-05-27 LAB
B-HCG UR QL: NEGATIVE
INTERNAL NEGATIVE CONTROL: NEGATIVE
INTERNAL POSITIVE CONTROL: POSITIVE
Lab: NORMAL

## 2021-05-27 PROCEDURE — 99213 OFFICE O/P EST LOW 20 MIN: CPT | Performed by: NURSE PRACTITIONER

## 2021-05-27 PROCEDURE — 58301 REMOVE INTRAUTERINE DEVICE: CPT | Performed by: NURSE PRACTITIONER

## 2021-05-27 PROCEDURE — 81025 URINE PREGNANCY TEST: CPT | Performed by: NURSE PRACTITIONER

## 2021-05-27 NOTE — TELEPHONE ENCOUNTER
We have a mutual patient. She has a h/o pulmonary embolism. She reports a negative workup for cause of the PE. She is 5 months postpartum and currently on Eliquis and follows up with you in July. She had the ParaGuard IUD placed at her 6 weeks postpartum. Today, I had to remove the IUD because it was malpositioned. In discussion with what her regarding the next step for contraception, she reports she was not really happy with the non hormonal IUD because her periods were very heavy and long. Probably the Eliquis and non hormonal IUD were both contributory to. Anyway, she desires to preserve her fertility as she does desire another pregnancy in the future. What are you thoughts about her being on a progestin only contraception?

## 2021-05-28 LAB
C TRACH RRNA SPEC QL NAA+PROBE: NEGATIVE
N GONORRHOEA RRNA SPEC QL NAA+PROBE: NEGATIVE

## 2021-06-03 ENCOUNTER — TELEPHONE (OUTPATIENT)
Dept: OBSTETRICS AND GYNECOLOGY | Facility: CLINIC | Age: 31
End: 2021-06-03

## 2021-06-03 PROBLEM — T83.32XA MALPOSITIONED INTRAUTERINE DEVICE: Status: ACTIVE | Noted: 2021-06-03

## 2021-06-03 PROBLEM — Z30.432 ENCOUNTER FOR IUD REMOVAL: Status: ACTIVE | Noted: 2021-06-03

## 2021-06-03 PROBLEM — Z30.09 ENCOUNTER FOR COUNSELING REGARDING CONTRACEPTION: Status: ACTIVE | Noted: 2021-06-03

## 2021-06-08 ENCOUNTER — OFFICE VISIT (OUTPATIENT)
Dept: OBSTETRICS AND GYNECOLOGY | Facility: CLINIC | Age: 31
End: 2021-06-08

## 2021-06-08 VITALS
HEIGHT: 68 IN | DIASTOLIC BLOOD PRESSURE: 70 MMHG | BODY MASS INDEX: 32.74 KG/M2 | WEIGHT: 216 LBS | SYSTOLIC BLOOD PRESSURE: 124 MMHG

## 2021-06-08 DIAGNOSIS — Z30.430 ENCOUNTER FOR IUD INSERTION: Primary | ICD-10-CM

## 2021-06-08 LAB
B-HCG UR QL: NEGATIVE
INTERNAL NEGATIVE CONTROL: NORMAL
INTERNAL POSITIVE CONTROL: NORMAL
Lab: NORMAL

## 2021-06-08 PROCEDURE — 58300 INSERT INTRAUTERINE DEVICE: CPT | Performed by: NURSE PRACTITIONER

## 2021-06-08 PROCEDURE — 81025 URINE PREGNANCY TEST: CPT | Performed by: NURSE PRACTITIONER

## 2021-06-08 NOTE — PROGRESS NOTES
Procedure: Intrauterine device insertion      Pregnancy Test: neg    Current Contraception Method: abstinence     Chief Complaint: IUD insertion.  Patient presents for IUD insertion.  She appears stable today and agrees to proceed with IUD placement.  The IUD will be placed under US visualization d/t h/o malpositioned IUD. She agrees with plan for insertion under US visualization. She also agrees to have her IUD checked by US in 4 weeks with her string check.     Pre procedure indication 1) Desires Kyleena   Post procedure indication 1) Desires Kyleena     The risks, benefits, and alternatives to IUD were explained at length with the patient. All her questions were answered and consents were signed.    The patient was placed in a dorsal lithotomy position on the examining table in [facility]. A bimanual exam confirmed the uterus was normal in size, AV.  A warmed metal speculum was inserted into the vagina and the cervix was brought into view.     The cervix was prepped with Betadine. A tenaculum was not used to grasp the cervix. The endometrial cavity was then sounded to 8 cm without use of a dilator.    The  was then carefully advanced to the cervical canal into the uterus to the level of the fundus. This was then backed off about 1.5-2 cm to allow sufficient space for the arms to open. The device was deployed. The  was removed carefully from the uterus. The threads were then cut leaving 2-3 cm visible outside of the cervix.   Good hemostasis was noted.     All other instruments were removed from the vagina. There were no complications.  The patient tolerated the procedure well with a minimal amount of discomfort.    The patient was counseled about the need to return in 4 weeks for string check.     She was counseled about the need to use a backup method of contraception such as condoms for 1-2 weeks. The patient is counseled to contact us if she has any significant or increasing bleeding, pain,  fever, chills, or other concerns. She is instructed to see a doctor right away if she believes that she may be pregnant at any time with the IUD in place.    Please check IUD location by  US at her IUD string check in 4 weeks     RTO 4 weeks     NORMAN Dubon    6/8/2021  16:07 EDT

## 2021-07-12 ENCOUNTER — OFFICE VISIT (OUTPATIENT)
Dept: OBSTETRICS AND GYNECOLOGY | Facility: CLINIC | Age: 31
End: 2021-07-12

## 2021-07-12 VITALS
SYSTOLIC BLOOD PRESSURE: 126 MMHG | HEIGHT: 68 IN | BODY MASS INDEX: 33.04 KG/M2 | DIASTOLIC BLOOD PRESSURE: 80 MMHG | WEIGHT: 218 LBS

## 2021-07-12 DIAGNOSIS — Z13.89 SCREENING FOR GENITOURINARY CONDITION: ICD-10-CM

## 2021-07-12 DIAGNOSIS — N83.202 CYST OF LEFT OVARY: ICD-10-CM

## 2021-07-12 DIAGNOSIS — Z30.431 IUD CHECK UP: Primary | ICD-10-CM

## 2021-07-12 PROCEDURE — 81025 URINE PREGNANCY TEST: CPT | Performed by: NURSE PRACTITIONER

## 2021-07-12 PROCEDURE — 99213 OFFICE O/P EST LOW 20 MIN: CPT | Performed by: NURSE PRACTITIONER

## 2021-07-12 NOTE — PROGRESS NOTES
"Subjective     Chief Complaint   Patient presents with   • Follow-up     string check       Es Branch is a 31 y.o.  whose LMP is No LMP recorded. (Menstrual status: Other).. She presents today for IUD check. She had the Kylena IUD placed on 21 by US visualization. This IUD was a replacement for the ParaGard that was malpositioned in the uterus and had to be removed.  She is happy with the IUD. She reports she has had a period that was \"better\" than her cycles with paragard. She states, \" I could actually wear the reg tampon instead of the super plus.\" She has not felt her IUD strings and her partner has not felt them either.     She does have c/o (L) sided pelvic pain. The pain is mild in nature. She is using Tylenol for her c/o. She states, \"I thought it was just the IUD.\"     HPI    HPI    The following portions of the patient's history were reviewed and updated as appropriate:vital signs, allergies, current medications, past medical history, past social history, past surgical history and problem list      Review of Systems     Review of Systems   Constitutional: Negative.    Gastrointestinal: Negative.    Genitourinary: Positive for pelvic pain (L) Side. Negative for vaginal discharge.   Musculoskeletal: Negative.    Skin: Negative.    Psychiatric/Behavioral: Negative.        Objective      /80   Ht 172.7 cm (68\")   Wt 98.9 kg (218 lb)   BMI 33.15 kg/m²     Physical Exam    Physical Exam  Vitals and nursing note reviewed.   Constitutional:       Appearance: Normal appearance.   Abdominal:      General: There is no distension.      Palpations: Abdomen is soft.      Tenderness: There is no abdominal tenderness.   Genitourinary:     Labia:         Right: No rash, tenderness, lesion or injury.         Left: No rash, tenderness, lesion or injury.       Vagina: No signs of injury and foreign body. Vaginal discharge present. No erythema, tenderness, bleeding, lesions or prolapsed vaginal walls. "      Cervix: No cervical motion tenderness, discharge, friability, lesion, erythema, cervical bleeding or eversion.      Comments: IUD string visualized   Musculoskeletal:         General: No swelling. Normal range of motion.   Skin:     General: Skin is warm and dry.   Neurological:      General: No focal deficit present.      Mental Status: She is alert and oriented to person, place, and time.   Psychiatric:         Mood and Affect: Mood normal.         Behavior: Behavior normal.         Lab Review   Labs: Urine pregnancy test     Imaging   Ultrasound - Pelvic Vaginal IMP: IUD noted in endometrium. (L) ovarian cyst measuring 4.6 x 4.3cm.     Assessment  Diagnoses and all orders for this visit:    1. Screening for genitourinary condition (Primary)  -     POC Pregnancy, Urine        Additional Assessment:   1. IUD check  2. (L) ovarian cyst      Plan     1. IUD check- Strings visualized. US findings disc with patient. IUD noted in good location on US.   2. (L) ovarian cyst- Rev warn s/s, ovarian torsion precautions given. Not a candidate for estrogen. Taking Eliquis and has hematology appt on Thursday to be discontinued. Recheck in 2 weeks.   3. Scheduled for: STD Labs - N/A , Ultrasound of the -  PELVIC , Mammography - N/A , Bone Density Test - N/A , Additional Labs - N/A  4.   BMI: Patient's Body mass index is 33.15 kg/m². indicating that she is obese (BMI >30).     Return in about 2 weeks (around 7/26/2021) for Ultrasound.      María Elena Arthur, NORMAN  7/12/2021

## 2021-07-15 ENCOUNTER — TELEMEDICINE (OUTPATIENT)
Dept: ONCOLOGY | Facility: CLINIC | Age: 31
End: 2021-07-15

## 2021-07-15 DIAGNOSIS — I26.99 ACUTE PULMONARY EMBOLISM, UNSPECIFIED PULMONARY EMBOLISM TYPE, UNSPECIFIED WHETHER ACUTE COR PULMONALE PRESENT (HCC): Primary | ICD-10-CM

## 2021-07-15 PROCEDURE — 99213 OFFICE O/P EST LOW 20 MIN: CPT | Performed by: INTERNAL MEDICINE

## 2021-07-15 NOTE — PROGRESS NOTES
Subjective   Es Branch is a 31 y.o. female.  Referred by Mary Dorantes for left sided pulmonary embolism    Reason for follow-up  Follow-up on thrombophilia work-up  Pulmonary embolism    History of Present Illness   Ms. Branch is a 30-year-old  lady with history of polycystic ovarian syndrome, gestational diabetes who delivered her first child about 3 weeks ago presents for further evaluation of pulmonary embolism.  She had a fairly complicated pregnancy with gestational diabetes and hypertension and underwent a  after failed induction.  She presented to the emergency room on 2020 with complaints of chest pain and pressure for the chest CTA was performed.  CT was suggestive of poor opacification of the pulmonary arteries of the segmental and subsegmental levels with regular opacification within the segmental and subsegmental branches of the left lower pulmonary arterial vasculature.  There are wedge-shaped opacities in the left lower lobe of the lung.  This was concerning for pulmonary embolism.  She was started on Eliquis and discharged home.  She came back to the emergency room subsequently with complaints of chest pain for which she was given hydrocodone to help with the pain.  She was evaluated by her OB at her postpartum visit who referred her to hematology for further management.  Patient denies any previous history of pulmonary embolism or DVT.  She had 1 miscarriage in 2020 at 7 weeks.  She reports a family history of pulmonary embolism with her maternal grandmother having  of a PE.  Maternal aunt also has DVT.  Patient's mother has been diagnosed with leukemia and reportedly on Hydrea.  Patient denies any recent travel.  She smokes about half pack per day.  She had quit with pregnancy however she has resumed smoking in the postpartum period.      Interval History  Ms. Branch presents today for a video visit.  She reports no new lower extremity edema pain or  discomfort.  She has completed 6 months of anticoagulation.  She also had a cholecystectomy without any complications.  No other complaints at this time  Denies any chest pain, dyspnea, palpitations      The following portions of the patient's history were reviewed and updated as appropriate: allergies, current medications, past family history, past medical history, past social history, past surgical history and problem list.    Past Medical History:   Diagnosis Date   • Anemia    • Asthma    • Female infertility    • Gestational diabetes    • Headache    • Hypertension    • PCOS (polycystic ovarian syndrome) 2013   • Polycystic ovary syndrome         Past Surgical History:   Procedure Laterality Date   • ADENOIDECTOMY     •  SECTION N/A 2020    Procedure:  SECTION PRIMARY;  Surgeon: Prachi Kim DO;  Location: [facility] LABOR DELIVERY;  Service: Obstetrics/Gynecology;  Laterality: N/A;   • CHOLECYSTECTOMY     • DIAGNOSTIC LAPAROSCOPY N/A 2020    Procedure: DIAGNOSTIC LAPAROSCOPY;  Surgeon: Imelda Rayo MD;  Location: [facility] OR;  Service: Obstetrics/Gynecology;  Laterality: N/A;   • TONSILLECTOMY  2008    adnoids also   • US GUIDED CYST ASPIRATION BREAST N/A 2020        Family History   Problem Relation Age of Onset   • Hypertension Mother    • Cancer Mother    • Hypertension Maternal Grandmother    • Diabetes Maternal Grandfather    • Hypertension Paternal Grandmother    • Diabetes Paternal Grandfather    • Deep vein thrombosis Maternal Aunt    • Breast cancer Neg Hx    • Colon cancer Neg Hx    • Pulmonary embolism Neg Hx    • Anesthesia problems Neg Hx         Social History     Socioeconomic History   • Marital status:      Spouse name: Not on file   • Number of children: Not on file   • Years of education: Not on file   • Highest education level: Not on file   Tobacco Use   • Smoking status: Current Every Day Smoker     Packs/day: 0.25     Types:  Cigarettes     Last attempt to quit: 2020     Years since quittin.0   • Smokeless tobacco: Never Used   Vaping Use   • Vaping Use: Never used   Substance and Sexual Activity   • Alcohol use: Not Currently   • Drug use: No   • Sexual activity: Yes     Partners: Male     Birth control/protection: None        OB History        2    Para   1    Term   1            AB   1    Living   1       SAB   1    TAB        Ectopic        Molar        Multiple   0    Live Births   1                 Allergies   Allergen Reactions   • Zithromax [Azithromycin] Rash            Review of Systems   Constitutional: Negative.    HENT: Negative.    Eyes: Negative.    Respiratory: Negative for chest tightness.    Cardiovascular: Negative for chest pain.   Gastrointestinal: Negative.    Endocrine: Negative.    Genitourinary: Negative.    Musculoskeletal: Negative.    Allergic/Immunologic: Negative.    Neurological: Negative.    Hematological: Negative.    Psychiatric/Behavioral: Negative.      Review of systems as mentioned in the HPI    Objective   not currently breastfeeding.   Physical Exam  Vitals reviewed.   Constitutional:       Appearance: Normal appearance.   HENT:      Head: Normocephalic and atraumatic.      Right Ear: External ear normal.      Left Ear: External ear normal.      Nose: Nose normal. No congestion or rhinorrhea.      Mouth/Throat:      Mouth: Mucous membranes are moist.      Pharynx: Oropharynx is clear.   Eyes:      General: No scleral icterus.        Right eye: No discharge.         Left eye: No discharge.      Conjunctiva/sclera: Conjunctivae normal.      Pupils: Pupils are equal, round, and reactive to light.   Cardiovascular:      Rate and Rhythm: Normal rate and regular rhythm.      Pulses: Normal pulses.      Heart sounds: Normal heart sounds.   Pulmonary:      Effort: Pulmonary effort is normal. No respiratory distress.      Breath sounds: Normal breath sounds. No wheezing or rhonchi.    Chest:      Chest wall: No tenderness.   Abdominal:      General: Abdomen is flat. Bowel sounds are normal.   Musculoskeletal:         General: No swelling, tenderness, deformity or signs of injury. Normal range of motion.      Cervical back: Normal range of motion and neck supple.      Right lower leg: No edema.      Left lower leg: No edema.   Skin:     General: Skin is warm.      Coloration: Skin is not jaundiced or pale.      Findings: No bruising or erythema.   Neurological:      General: No focal deficit present.      Mental Status: She is alert and oriented to person, place, and time. Mental status is at baseline.   Psychiatric:         Mood and Affect: Mood normal.         Behavior: Behavior normal.         Thought Content: Thought content normal.         Judgment: Judgment normal.       Since this was a telephone visit no exam performed    Office Visit on 07/12/2021   Component Date Value Ref Range Status   • HCG, Urine, QL 07/12/2021 Negative  Negative Final   • Lot Number 07/12/2021 [id number]   Final   • Internal Positive Control 07/12/2021 Passed  Positive, Passed Final   • Internal Negative Control 07/12/2021 Passed  Negative, Passed Final        No radiology results for the last 30 days.     Labs 2/4/2021 reviewed  DRV VT elevated at 49.5 however this is thought to be secondary to the factor X inhibitors.    Lupus anticoagulant negative  Beta-2 glycoprotein IgG IgA and IgM negative  Anticardiolipin antibody IgG and IgM negative  Protein S antigen normal at 74 next protein S functional normal at 18  Protein C activity normal at 154%  Antithrombin III activity normal at 109%  Prothrombin gene mutation negative  Factor V Leiden mutation negative    Assessment/Plan       30-year-old premenopausal  lady who is currently 3 weeks postpartum presents with recent diagnosis of left-sided pulmonary embolism    *Subsegmental and segmental pulmonary embolism  · She has been started on Eliquis for the  treatment of pulmonary embolism  · Continue the same  · Eliquis started 12/31/2020  · Pulmonary embolus is most likely due to the recent pregnancy and hence provoked  · She denies any previous history of blood clots or DVT  · She also smokes which increase the risk of DVT and PE  · Thrombophilia work-up negative  · Completed 6 months of anticoagulant  · No new sign symptoms suggestive of DVT or PE  · Okay to discontinue anticoagulation.    *Planned cholecystectomy  · She had a cholecystectomy  · No excessive bleeding  · Anticoagulation interfered for cholecystectomy    *History of prior miscarriage  · Miscarriage in first trimester  · APS testing negative    *Tobacco use  · Extensive counseling performed today for smoking cessation.  · Discussed increased risk of DVT PE, malignancies, coronary artery disease associated with smoking.  · Quit smoking completely    *Anemia  · Hemoglobin improved and normal at 13   · most likely secondary to iron deficiency from recent pregnancy    *Patient had questions about hormonal IUD-explained to her that the risk of DVT with hormonal IUD is fairly low and would be reasonable.    *Follow-up-as needed    You have chosen to receive care through a telehealth visit.  Do you consent to use a video/audio connection for your medical care today? Yes    Unable to complete visit using a video connection to the patient. A phone visit was used to complete this visits. Total time of discussion and documentation was 22 minutes.

## 2021-08-04 ENCOUNTER — OFFICE VISIT (OUTPATIENT)
Dept: OBSTETRICS AND GYNECOLOGY | Facility: CLINIC | Age: 31
End: 2021-08-04

## 2021-08-04 VITALS
DIASTOLIC BLOOD PRESSURE: 100 MMHG | BODY MASS INDEX: 32.88 KG/M2 | HEIGHT: 69 IN | SYSTOLIC BLOOD PRESSURE: 190 MMHG | WEIGHT: 222 LBS

## 2021-08-04 DIAGNOSIS — N83.202 CYSTS OF BOTH OVARIES: Primary | ICD-10-CM

## 2021-08-04 DIAGNOSIS — Z13.89 SCREENING FOR GENITOURINARY CONDITION: ICD-10-CM

## 2021-08-04 DIAGNOSIS — Z30.431 IUD CHECK UP: ICD-10-CM

## 2021-08-04 DIAGNOSIS — N83.201 CYSTS OF BOTH OVARIES: Primary | ICD-10-CM

## 2021-08-04 LAB
B-HCG UR QL: NEGATIVE
BILIRUB BLD-MCNC: NEGATIVE MG/DL
CLARITY, POC: CLEAR
COLOR UR: YELLOW
GLUCOSE UR STRIP-MCNC: NEGATIVE MG/DL
INTERNAL NEGATIVE CONTROL: NEGATIVE
INTERNAL POSITIVE CONTROL: POSITIVE
KETONES UR QL: NEGATIVE
LEUKOCYTE EST, POC: NEGATIVE
Lab: NORMAL
NITRITE UR-MCNC: NEGATIVE MG/ML
PH UR: 5 [PH] (ref 5–8)
PROT UR STRIP-MCNC: NEGATIVE MG/DL
RBC # UR STRIP: NEGATIVE /UL
SP GR UR: 1 (ref 1–1.03)
UROBILINOGEN UR QL: NORMAL

## 2021-08-04 PROCEDURE — 99213 OFFICE O/P EST LOW 20 MIN: CPT | Performed by: NURSE PRACTITIONER

## 2021-08-04 PROCEDURE — 81025 URINE PREGNANCY TEST: CPT | Performed by: NURSE PRACTITIONER

## 2021-08-04 RX ORDER — IBUPROFEN 800 MG/1
800 TABLET ORAL EVERY 8 HOURS PRN
Qty: 30 TABLET | Refills: 0 | OUTPATIENT
Start: 2021-08-04 | End: 2022-03-24

## 2021-08-04 NOTE — PROGRESS NOTES
"Subjective     Chief Complaint   Patient presents with   • Pelvic Pain       Es Branch is a 31 y.o.  whose LMP is Patient's last menstrual period was 2021.. She presents with c/o ongoing pelvic pain. She has the Kylena IUD which was placed earlier this year. On  when she came in for IUD check she was noted to have a (R) ovarian cyst > 4cm.  Precaution were given and expectant management was decided upon. She reports she has pelvic and leg pain that has worsened.     HPI    HPI    The following portions of the patient's history were reviewed and updated as appropriate:vital signs, allergies, current medications, past medical history, past social history, past surgical history and problem list      Review of Systems     Review of Systems   Constitutional: Negative.    Gastrointestinal: Negative.    Genitourinary: Positive for pelvic pain. Negative for vaginal discharge.   Musculoskeletal: Positive for back pain.        Hip and leg pain    Skin: Negative.    Psychiatric/Behavioral: Negative.        Objective      BP (!) 190/100   Ht 175.3 cm (69\")   Wt 101 kg (222 lb)   LMP 2021   Breastfeeding No   BMI 32.78 kg/m²     Physical Exam    Physical Exam    Lab Review   Labs: Urine pregnancy test, Urinalysis - with micro     Imaging   Ultrasound - Pelvic Vaginal IMP: Uterus AV. (R) ovary with simple cyst- 3.3. x 2.7cm. (L) ovary with simple cyst 3.7 x 2.9cm. No free fluid. NO endometrial masses seen. IUD in place.       Assessment  Diagnoses and all orders for this visit:    1. Screening for genitourinary condition (Primary)  -     POC Urinalysis Dipstick  -     POC Pregnancy, Urine        Additional Assessment:   1.  Ovarian cyst  2.  IUD check   3.  Elevated BP      Plan     1. Ovarian cyst: Vincenzo ovarian cyst, simple in nature. Disc not surgical case at this time. Plan to repeat US in 2 weeks. Disc possibility of IUD increasing the incidence of cyst. Disc possible use of POPs to try and " help suppress the ovaries. She is not a candidate for COCs d/t h/o PE. She is no longer taking Eliquis. Rec ibuprofen to help with pain. If cyst resolved and pain continues, may need to consider her IUD as the source of her pain.   2. IUD check: IUD noted in correct location on US  3. Elevated BP: Disc BP findings today. Her PCP was contacted during the visit and gave her an appt for later today. Rev jessica s/s.     RTO 2 weeks for repeat US     María Elena Arthur, NORMAN  8/4/2021

## 2021-08-06 ENCOUNTER — TELEPHONE (OUTPATIENT)
Dept: OBSTETRICS AND GYNECOLOGY | Facility: CLINIC | Age: 31
End: 2021-08-06

## 2021-08-19 PROBLEM — N83.202 CYSTS OF BOTH OVARIES: Status: ACTIVE | Noted: 2021-08-19

## 2021-08-19 PROBLEM — N83.201 CYSTS OF BOTH OVARIES: Status: ACTIVE | Noted: 2021-08-19

## 2021-10-07 ENCOUNTER — OFFICE VISIT (OUTPATIENT)
Dept: OBSTETRICS AND GYNECOLOGY | Facility: CLINIC | Age: 31
End: 2021-10-07

## 2021-10-07 VITALS
HEIGHT: 69 IN | WEIGHT: 222.4 LBS | SYSTOLIC BLOOD PRESSURE: 142 MMHG | BODY MASS INDEX: 32.94 KG/M2 | DIASTOLIC BLOOD PRESSURE: 92 MMHG

## 2021-10-07 DIAGNOSIS — Z13.9 SCREENING FOR CONDITION: ICD-10-CM

## 2021-10-07 DIAGNOSIS — Z01.419 PAP SMEAR, LOW-RISK: Primary | ICD-10-CM

## 2021-10-07 DIAGNOSIS — R10.2 PELVIC PAIN: ICD-10-CM

## 2021-10-07 DIAGNOSIS — N39.41 URGE INCONTINENCE OF URINE: ICD-10-CM

## 2021-10-07 DIAGNOSIS — Z01.419 ROUTINE GYNECOLOGICAL EXAMINATION: ICD-10-CM

## 2021-10-07 DIAGNOSIS — Z11.51 SPECIAL SCREENING EXAMINATION FOR HUMAN PAPILLOMAVIRUS (HPV): ICD-10-CM

## 2021-10-07 LAB
BILIRUB BLD-MCNC: NEGATIVE MG/DL
CLARITY, POC: CLEAR
COLOR UR: YELLOW
GLUCOSE UR STRIP-MCNC: NEGATIVE MG/DL
KETONES UR QL: NEGATIVE
LEUKOCYTE EST, POC: NEGATIVE
NITRITE UR-MCNC: NEGATIVE MG/ML
PH UR: 5 [PH] (ref 5–8)
PROT UR STRIP-MCNC: NEGATIVE MG/DL
RBC # UR STRIP: NEGATIVE /UL
SP GR UR: 1 (ref 1–1.03)
UROBILINOGEN UR QL: NORMAL

## 2021-10-07 PROCEDURE — 99213 OFFICE O/P EST LOW 20 MIN: CPT | Performed by: OBSTETRICS & GYNECOLOGY

## 2021-10-07 RX ORDER — CHOLESTYRAMINE 4 G/9G
POWDER, FOR SUSPENSION ORAL
COMMUNITY
Start: 2021-08-06 | End: 2021-11-18

## 2021-10-07 RX ORDER — METOPROLOL SUCCINATE 100 MG/1
100 TABLET, EXTENDED RELEASE ORAL DAILY
COMMUNITY
Start: 2021-08-04 | End: 2021-11-18

## 2021-10-07 NOTE — PROGRESS NOTES
"      Es Branch is a 31 y.o. patient who presents for follow up of   Chief Complaint   Patient presents with   • Ovarian Cyst     back pain, bladder leakage       HPI 2-week history of pelvic pain urinary urgency, frequency and incontinence.  No fevers.  She has a history of ovarian cyst and worries that this is the same type of pain.  She has had no irregular bleeding.  Blood sugars are under good control.  Is a new problem for her.  She has normal bowel movements and no fevers.  Is not associated with any specific cause.    The following portions of the patient's history were reviewed and updated as appropriate: allergies, current medications and problem list.    Review of Systems   Constitutional: Negative for appetite change, fever and unexpected weight change.   HENT: Negative for congestion and sore throat.    Respiratory: Negative for cough and shortness of breath.    Cardiovascular: Negative for chest pain and palpitations.   Gastrointestinal: Negative for abdominal distention, abdominal pain, constipation, diarrhea, nausea and vomiting.   Endocrine: Negative.    Genitourinary: Positive for frequency, pelvic pain and urgency. Negative for dyspareunia, menstrual problem and vaginal discharge.   Skin: Negative.    Neurological: Negative for dizziness and syncope.   Hematological: Negative.    Psychiatric/Behavioral: Negative for dysphoric mood and sleep disturbance. The patient is not nervous/anxious.        /92   Ht 175.3 cm (69\")   Wt 101 kg (222 lb 6.4 oz)   BMI 32.84 kg/m²     Physical Exam  Vitals and nursing note reviewed. Exam conducted with a chaperone present.   Constitutional:       Appearance: She is well-developed.   HENT:      Head: Normocephalic and atraumatic.   Pulmonary:      Effort: Pulmonary effort is normal. No respiratory distress.   Abdominal:      General: There is no distension.      Palpations: Abdomen is soft. There is no mass.      Tenderness: There is no abdominal " tenderness. There is no guarding or rebound.   Genitourinary:     General: Normal vulva.      Exam position: Supine.      Vagina: Normal.      Cervix: Normal.      Uterus: Normal.       Adnexa: Right adnexa normal and left adnexa normal.   Musculoskeletal:         General: Normal range of motion.   Skin:     General: Skin is warm and dry.   Neurological:      Mental Status: She is alert and oriented to person, place, and time.   Psychiatric:         Behavior: Behavior normal.         Thought Content: Thought content normal.         Judgment: Judgment normal.           Assessment/Plan    Diagnoses and all orders for this visit:    1. Pelvic pain (Primary)    2. Urge incontinence of urine    3. Screening for condition  -     POC Urinalysis Dipstick    Urinalysis is normal.  Consider overactive bladder as a differential diagnosis.  No evidence of large pelvic mass on bimanual exam.  Check pelvic ultrasound and urodynamics.    Return for Urodynamics, US, TV, Follow up with me.      Murphy Wood MD  10/7/2021  16:35 EDT

## 2021-10-12 LAB
CYTOLOGIST CVX/VAG CYTO: NORMAL
CYTOLOGY CVX/VAG DOC CYTO: NORMAL
CYTOLOGY CVX/VAG DOC THIN PREP: NORMAL
DX ICD CODE: NORMAL
HIV 1 & 2 AB SER-IMP: NORMAL
HPV I/H RISK 4 DNA CVX QL PROBE+SIG AMP: NEGATIVE
Lab: NORMAL
OTHER STN SPEC: NORMAL
STAT OF ADQ CVX/VAG CYTO-IMP: NORMAL

## 2021-11-16 ENCOUNTER — TELEPHONE (OUTPATIENT)
Dept: OBSTETRICS AND GYNECOLOGY | Facility: CLINIC | Age: 31
End: 2021-11-16

## 2021-11-17 DIAGNOSIS — N39.41 URGE INCONTINENCE OF URINE: Primary | ICD-10-CM

## 2021-11-18 ENCOUNTER — HOSPITAL ENCOUNTER (EMERGENCY)
Facility: HOSPITAL | Age: 31
Discharge: HOME | End: 2021-11-18
Attending: EMERGENCY MEDICINE | Admitting: EMERGENCY MEDICINE

## 2021-11-18 ENCOUNTER — APPOINTMENT (OUTPATIENT)
Dept: GENERAL RADIOLOGY | Facility: HOSPITAL | Age: 31
End: 2021-11-18

## 2021-11-18 VITALS
OXYGEN SATURATION: 97 % | WEIGHT: 218.6 LBS | TEMPERATURE: 99.6 F | SYSTOLIC BLOOD PRESSURE: 114 MMHG | RESPIRATION RATE: 15 BRPM | BODY MASS INDEX: 35.13 KG/M2 | HEIGHT: 66 IN | HEART RATE: 70 BPM | DIASTOLIC BLOOD PRESSURE: 66 MMHG

## 2021-11-18 DIAGNOSIS — J45.21 MILD INTERMITTENT ASTHMA WITH EXACERBATION: ICD-10-CM

## 2021-11-18 DIAGNOSIS — J06.9 VIRAL URI WITH COUGH: Primary | ICD-10-CM

## 2021-11-18 LAB — D DIMER PPP FEU-MCNC: 0.38 MCGFEU/ML (ref 0–0.46)

## 2021-11-18 PROCEDURE — 93010 ELECTROCARDIOGRAM REPORT: CPT | Performed by: INTERNAL MEDICINE

## 2021-11-18 PROCEDURE — 85379 FIBRIN DEGRADATION QUANT: CPT | Performed by: EMERGENCY MEDICINE

## 2021-11-18 PROCEDURE — 93005 ELECTROCARDIOGRAM TRACING: CPT

## 2021-11-18 PROCEDURE — 71045 X-RAY EXAM CHEST 1 VIEW: CPT

## 2021-11-18 PROCEDURE — 99283 EMERGENCY DEPT VISIT LOW MDM: CPT | Performed by: EMERGENCY MEDICINE

## 2021-11-18 PROCEDURE — 93005 ELECTROCARDIOGRAM TRACING: CPT | Performed by: EMERGENCY MEDICINE

## 2021-11-18 PROCEDURE — 36415 COLL VENOUS BLD VENIPUNCTURE: CPT

## 2021-11-18 PROCEDURE — 99283 EMERGENCY DEPT VISIT LOW MDM: CPT

## 2021-11-18 RX ORDER — SODIUM CHLORIDE 0.9 % (FLUSH) 0.9 %
10 SYRINGE (ML) INJECTION AS NEEDED
Status: DISCONTINUED | OUTPATIENT
Start: 2021-11-18 | End: 2021-11-18 | Stop reason: HOSPADM

## 2021-11-18 NOTE — DISCHARGE INSTRUCTIONS
Medications as directed.  Follow-up with your PCP as above.  Return to ED for worsening symptoms, medical emergencies.

## 2021-11-18 NOTE — ED NOTES
Called pts name in waiting room no answer,checked bathroom no answer     Lorena Powers  11/18/21 [id number]

## 2021-11-18 NOTE — ED PROVIDER NOTES
Subjective   Es Branch is a 31-year-old female who present secondary to sinus issues, cough and shortness of breath.  Patient has had sinus issues the past couple of days.  Nonproductive cough and shortness of breath onset today.  Patient went to an immediate care center.  A Covid swab was obtained however it will not be resulted for 48 hours.  Patient lives in [facility].  They have a rapid Covid test available at the [facility].  Patient's Covid swab at the [facility] was negative.  Continue to feel short of breath.  She gave herself 2 breathing treatments.  Patient's  is [facility].  They checked patient's oxygen saturation which revealed a sat of 91%.  Thus patient elected to come to the ED for further evaluation.      History provided by:  Patient      Review of Systems   Constitutional: Negative.  Negative for fever.   HENT: Positive for sinus pain. Negative for rhinorrhea.    Eyes: Negative.  Negative for redness.   Respiratory: Positive for cough and shortness of breath.    Cardiovascular: Negative for palpitations.   Gastrointestinal: Negative for abdominal pain.   Endocrine: Negative.    Genitourinary: Negative.  Negative for difficulty urinating.   Musculoskeletal: Negative.  Negative for back pain.   Skin: Negative.  Negative for color change.   Neurological: Negative.  Negative for syncope.   Hematological: Negative.    Psychiatric/Behavioral: Negative.    All other systems reviewed and are negative.      Past Medical History:   Diagnosis Date   • Anemia    • Asthma    • Female infertility    • Gestational diabetes    • Headache    • Hypertension    • PCOS (polycystic ovarian syndrome) 2013   • Polycystic ovary syndrome        Allergies   Allergen Reactions   • Zithromax [Azithromycin] Rash       Past Surgical History:   Procedure Laterality Date   • ADENOIDECTOMY     •  SECTION N/A 2020    Procedure:  SECTION PRIMARY;  Surgeon: Prachi Kim DO;   Location: [facility] LABOR DELIVERY;  Service: Obstetrics/Gynecology;  Laterality: N/A;   • CHOLECYSTECTOMY     • DIAGNOSTIC LAPAROSCOPY N/A 2020    Procedure: DIAGNOSTIC LAPAROSCOPY;  Surgeon: Imelda Rayo MD;  Location: [facility] OR;  Service: Obstetrics/Gynecology;  Laterality: N/A;   • TONSILLECTOMY  2008    adnoids also   • US GUIDED CYST ASPIRATION BREAST N/A 2020       Family History   Problem Relation Age of Onset   • Hypertension Mother    • Cancer Mother    • Hypertension Maternal Grandmother    • Diabetes Maternal Grandfather    • Hypertension Paternal Grandmother    • Diabetes Paternal Grandfather    • Deep vein thrombosis Maternal Aunt    • Breast cancer Neg Hx    • Colon cancer Neg Hx    • Pulmonary embolism Neg Hx    • Anesthesia problems Neg Hx        Social History     Socioeconomic History   • Marital status:    Tobacco Use   • Smoking status: Current Every Day Smoker     Packs/day: 0.25     Types: Cigarettes     Last attempt to quit: 2020     Years since quittin.4   • Smokeless tobacco: Never Used   Vaping Use   • Vaping Use: Never used   Substance and Sexual Activity   • Alcohol use: Not Currently   • Drug use: No   • Sexual activity: Yes     Partners: Male     Birth control/protection: None           Objective   Physical Exam  Vitals and nursing note reviewed.   Constitutional:       General: She is not in acute distress.     Appearance: Normal appearance. She is well-developed. She is not ill-appearing, toxic-appearing or diaphoretic.      Comments: 31-year-old white female laying in bed.  Patient is a bit overweight.  Otherwise appears in good health.  Vital signs unremarkable.  Patient friendly and cooperative.   HENT:      Head: Normocephalic and atraumatic.      Right Ear: Tympanic membrane, ear canal and external ear normal.      Left Ear: Tympanic membrane, ear canal and external ear normal.      Nose: Nose normal.      Mouth/Throat:      Mouth: Mucous  membranes are moist.      Pharynx: Oropharynx is clear.   Eyes:      Extraocular Movements: Extraocular movements intact.      Conjunctiva/sclera: Conjunctivae normal.      Pupils: Pupils are equal, round, and reactive to light.   Cardiovascular:      Rate and Rhythm: Normal rate and regular rhythm.      Pulses: Normal pulses.      Heart sounds: Normal heart sounds. No murmur heard.  No friction rub. No gallop.    Pulmonary:      Effort: Pulmonary effort is normal.      Breath sounds: Normal breath sounds.   Abdominal:      General: Bowel sounds are normal. There is no distension.      Palpations: Abdomen is soft. There is no mass.      Tenderness: There is no abdominal tenderness. There is no guarding or rebound.      Hernia: No hernia is present.   Musculoskeletal:         General: Normal range of motion.      Cervical back: Normal range of motion and neck supple.   Skin:     General: Skin is warm and dry.      Capillary Refill: Capillary refill takes less than 2 seconds.   Neurological:      General: No focal deficit present.      Mental Status: She is alert and oriented to person, place, and time.      Deep Tendon Reflexes: Reflexes are normal and symmetric.   Psychiatric:         Mood and Affect: Mood normal.         Behavior: Behavior normal.         Procedures       EKG 12-lead  Date 11/18/2021  Time 12: 20 p.m.  Normal sinus rhythm with sinus arrhythmia  Normal axis  Normal intervals  Normal R wave progression  No ST elevations or depressions  No T wave abnormalities  Essentially normal EKG    ED Course  ED Course as of 11/18/21 1632   Thu Nov 18, 2021   1247 EKG shows mild sinus arrhythmia.  Otherwise unremarkable.  Physical exam unremarkable.  Obtaining chest x-ray as well as a D-dimer.  Patient had prior PE shortly after childbirth.  She completed 6 months of anticoagulation. [SS]   1408 Patient's oxygen saturations in the high 90s throughout ER stay.  Chest x-ray is unremarkable by my contemporaneous  read.  D-dimer is unremarkable.  No evidence of ongoing disease process.  Obviously negative D-dimer points away from a pulmonary embolism.  I feel patient symptoms secondary to viral URI and asthma exacerbation.  Patient prescribed new albuterol MDI earlier today at urgent care center.  Discussed at length with patient all results, diagnosis and treatment.  Will DC home. [SS]      ED Course User Index  [SS] Sam Hutton MD      Labs Reviewed   D-DIMER, QUANTITATIVE - Normal    Narrative:     Can be elevated in, but is not diagnostic for deep vein thrombosis (DVT) or pulmonary embolis (PE).  It is also elevated in other medical conditions.  Clinical correlation is required.  The negative cut-off value for the D-Dimer is 0.50 mcg FEU/mL for DVT and PE.       XR Chest 1 View    Result Date: 11/18/2021  Narrative: CHEST X-RAY, 11/18/2021     HISTORY: 31-year-old female in the ED describing 2 day history of shortness of air and nonproductive cough.  TECHNIQUE: AP portable upright chest x-ray.  FINDINGS: Heart size and pulmonary vascularity are normal. The lungs are expanded and clear. No visible pulmonary infiltrate or pleural effusion. No change since 11/15/2019.      Impression: Negative chest.  This report was finalized on 11/18/2021 2:21 PM by Dr. Piyush Barnes MD.      My differential diagnosis for dyspnea includes but is not limited to:  Asthma, COPD, COVID-19, pneumonia, pulmonary embolus, acute respiratory distress syndrome, pneumothorax, pleural effusion, pulmonary fibrosis, congestive heart failure, myocardial infarction, DKA, uremia, acidosis, sepsis, anemia, drug related, hyperventilation, CNS disease    My differential diagnosis for chest pain includes but is not limited to:  Muscle strain, costochondritis, myositis, pleurisy, rib fracture, intercostal neuritis, herpes zoster, tumor, myocardial infarction, coronary syndrome, unstable angina, angina, aortic dissection, mitral valve prolapse,  pericarditis, palpitations, pulmonary embolus, pneumonia, pneumothorax, lung cancer, GERD, esophagitis, esophageal spasm                                       MDM    Final diagnoses:   Viral URI with cough   Mild intermittent asthma with exacerbation       ED Disposition  ED Disposition     ED Disposition Condition Comment    Discharge Stable           Rosetta Adams MD  [address] [facility] [address]  [phone]    Schedule an appointment as soon as possible for a visit in 1 week  for continued or worsened symptoms, Sooner if needed         Medication List      No changes were made to your prescriptions during this visit.          Sam Hutton MD  11/18/21 [id number]

## 2021-11-19 LAB — QT INTERVAL: 388 MS

## 2022-01-27 ENCOUNTER — HOSPITAL ENCOUNTER (OUTPATIENT)
Dept: GENERAL RADIOLOGY | Facility: HOSPITAL | Age: 32
Discharge: HOME | End: 2022-01-27
Admitting: NURSE PRACTITIONER

## 2022-01-27 PROCEDURE — 71046 X-RAY EXAM CHEST 2 VIEWS: CPT

## 2022-03-24 ENCOUNTER — TELEPHONE (OUTPATIENT)
Dept: OBSTETRICS AND GYNECOLOGY | Facility: CLINIC | Age: 32
End: 2022-03-24

## 2022-03-24 ENCOUNTER — HOSPITAL ENCOUNTER (EMERGENCY)
Facility: HOSPITAL | Age: 32
Discharge: HOME | End: 2022-03-24
Attending: EMERGENCY MEDICINE | Admitting: EMERGENCY MEDICINE

## 2022-03-24 VITALS
SYSTOLIC BLOOD PRESSURE: 153 MMHG | WEIGHT: 214 LBS | HEART RATE: 86 BPM | OXYGEN SATURATION: 97 % | DIASTOLIC BLOOD PRESSURE: 96 MMHG | RESPIRATION RATE: 16 BRPM | HEIGHT: 69 IN | TEMPERATURE: 98.5 F | BODY MASS INDEX: 31.7 KG/M2

## 2022-03-24 DIAGNOSIS — N83.202 LEFT OVARIAN CYST: Primary | ICD-10-CM

## 2022-03-24 LAB
ALBUMIN SERPL-MCNC: 4.2 G/DL (ref 3.5–5.2)
ALBUMIN/GLOB SERPL: 1.5 G/DL
ALP SERPL-CCNC: 80 U/L (ref 39–117)
ALT SERPL W P-5'-P-CCNC: 17 U/L (ref 1–33)
ANION GAP SERPL CALCULATED.3IONS-SCNC: 7.8 MMOL/L (ref 5–15)
AST SERPL-CCNC: 18 U/L (ref 1–32)
B-HCG UR QL: NEGATIVE
BACTERIA UR QL AUTO: ABNORMAL /HPF
BASOPHILS # BLD AUTO: 0.02 10*3/MM3 (ref 0–0.2)
BASOPHILS NFR BLD AUTO: 0.2 % (ref 0–1.5)
BILIRUB SERPL-MCNC: 0.2 MG/DL (ref 0–1.2)
BILIRUB UR QL STRIP: NEGATIVE
BUN SERPL-MCNC: 10 MG/DL (ref 6–20)
BUN/CREAT SERPL: 11.9 (ref 7–25)
CALCIUM SPEC-SCNC: 9 MG/DL (ref 8.6–10.5)
CHLORIDE SERPL-SCNC: 99 MMOL/L (ref 98–107)
CLARITY UR: CLEAR
CO2 SERPL-SCNC: 27.2 MMOL/L (ref 22–29)
COLOR UR: YELLOW
CREAT SERPL-MCNC: 0.84 MG/DL (ref 0.57–1)
DEPRECATED RDW RBC AUTO: 46.7 FL (ref 37–54)
EGFRCR SERPLBLD CKD-EPI 2021: 95.4 ML/MIN/1.73
EOSINOPHIL # BLD AUTO: 0.12 10*3/MM3 (ref 0–0.4)
EOSINOPHIL NFR BLD AUTO: 1.3 % (ref 0.3–6.2)
ERYTHROCYTE [DISTWIDTH] IN BLOOD BY AUTOMATED COUNT: 14.5 % (ref 12.3–15.4)
GLOBULIN UR ELPH-MCNC: 2.8 GM/DL
GLUCOSE SERPL-MCNC: 97 MG/DL (ref 65–99)
GLUCOSE UR STRIP-MCNC: NEGATIVE MG/DL
HCT VFR BLD AUTO: 44.1 % (ref 34–46.6)
HGB BLD-MCNC: 14.3 G/DL (ref 12–15.9)
HGB UR QL STRIP.AUTO: ABNORMAL
HYALINE CASTS UR QL AUTO: ABNORMAL /LPF
IMM GRANULOCYTES # BLD AUTO: 0.03 10*3/MM3 (ref 0–0.05)
IMM GRANULOCYTES NFR BLD AUTO: 0.3 % (ref 0–0.5)
KETONES UR QL STRIP: NEGATIVE
LEUKOCYTE ESTERASE UR QL STRIP.AUTO: NEGATIVE
LYMPHOCYTES # BLD AUTO: 1.52 10*3/MM3 (ref 0.7–3.1)
LYMPHOCYTES NFR BLD AUTO: 16.5 % (ref 19.6–45.3)
MCH RBC QN AUTO: 28.9 PG (ref 26.6–33)
MCHC RBC AUTO-ENTMCNC: 32.4 G/DL (ref 31.5–35.7)
MCV RBC AUTO: 89.1 FL (ref 79–97)
MONOCYTES # BLD AUTO: 0.57 10*3/MM3 (ref 0.1–0.9)
MONOCYTES NFR BLD AUTO: 6.2 % (ref 5–12)
NEUTROPHILS NFR BLD AUTO: 6.93 10*3/MM3 (ref 1.7–7)
NEUTROPHILS NFR BLD AUTO: 75.5 % (ref 42.7–76)
NITRITE UR QL STRIP: NEGATIVE
NRBC BLD AUTO-RTO: 0 /100 WBC (ref 0–0.2)
PH UR STRIP.AUTO: 5.5 [PH] (ref 4.5–8)
PLATELET # BLD AUTO: 295 10*3/MM3 (ref 140–450)
PMV BLD AUTO: 9.9 FL (ref 6–12)
POTASSIUM SERPL-SCNC: 3.7 MMOL/L (ref 3.5–5.2)
PROT SERPL-MCNC: 7 G/DL (ref 6–8.5)
PROT UR QL STRIP: NEGATIVE
RBC # BLD AUTO: 4.95 10*6/MM3 (ref 3.77–5.28)
RBC # UR STRIP: ABNORMAL /HPF
REF LAB TEST METHOD: ABNORMAL
SODIUM SERPL-SCNC: 134 MMOL/L (ref 136–145)
SP GR UR STRIP: 1.02 (ref 1–1.03)
SQUAMOUS #/AREA URNS HPF: ABNORMAL /HPF
UROBILINOGEN UR QL STRIP: ABNORMAL
WBC # UR STRIP: ABNORMAL /HPF
WBC NRBC COR # BLD: 9.19 10*3/MM3 (ref 3.4–10.8)

## 2022-03-24 PROCEDURE — 81001 URINALYSIS AUTO W/SCOPE: CPT | Performed by: EMERGENCY MEDICINE

## 2022-03-24 PROCEDURE — 96375 TX/PRO/DX INJ NEW DRUG ADDON: CPT

## 2022-03-24 PROCEDURE — 25010000002 ONDANSETRON PER 1 MG: Performed by: EMERGENCY MEDICINE

## 2022-03-24 PROCEDURE — 99283 EMERGENCY DEPT VISIT LOW MDM: CPT

## 2022-03-24 PROCEDURE — 25010000002 KETOROLAC TROMETHAMINE PER 15 MG: Performed by: EMERGENCY MEDICINE

## 2022-03-24 PROCEDURE — 96374 THER/PROPH/DIAG INJ IV PUSH: CPT

## 2022-03-24 PROCEDURE — 99282 EMERGENCY DEPT VISIT SF MDM: CPT | Performed by: EMERGENCY MEDICINE

## 2022-03-24 PROCEDURE — 85025 COMPLETE CBC W/AUTO DIFF WBC: CPT | Performed by: EMERGENCY MEDICINE

## 2022-03-24 PROCEDURE — 81025 URINE PREGNANCY TEST: CPT | Performed by: EMERGENCY MEDICINE

## 2022-03-24 PROCEDURE — 80053 COMPREHEN METABOLIC PANEL: CPT | Performed by: EMERGENCY MEDICINE

## 2022-03-24 RX ORDER — KETOROLAC TROMETHAMINE 10 MG/1
10 TABLET, FILM COATED ORAL EVERY 6 HOURS PRN
Qty: 20 TABLET | Refills: 0 | Status: SHIPPED | OUTPATIENT
Start: 2022-03-24

## 2022-03-24 RX ORDER — ONDANSETRON 2 MG/ML
4 INJECTION INTRAMUSCULAR; INTRAVENOUS ONCE
Status: COMPLETED | OUTPATIENT
Start: 2022-03-24 | End: 2022-03-24

## 2022-03-24 RX ORDER — KETOROLAC TROMETHAMINE 30 MG/ML
15 INJECTION, SOLUTION INTRAMUSCULAR; INTRAVENOUS ONCE
Status: COMPLETED | OUTPATIENT
Start: 2022-03-24 | End: 2022-03-24

## 2022-03-24 RX ADMIN — KETOROLAC TROMETHAMINE 15 MG: 30 INJECTION, SOLUTION INTRAMUSCULAR; INTRAVENOUS at 18:43

## 2022-03-24 RX ADMIN — ONDANSETRON 4 MG: 2 INJECTION INTRAMUSCULAR; INTRAVENOUS at 18:50

## 2022-03-24 NOTE — TELEPHONE ENCOUNTER
Patient called in stating that she is having pelvic pain and pressure. She was just in today for US and Labs. She was wondering if there was anything that she could do for these issues.     Please Advise.

## 2022-03-24 NOTE — DISCHARGE INSTRUCTIONS
Take the Toradol as needed as prescribed for pain.  Call Dr. Ortiz's office in the morning to arrange an appointment for a repeat visit and pelvic ultrasound in 1 month.  Call the office if you are worse, worsening pain, fever, or return to the emergency department.

## 2022-03-24 NOTE — TELEPHONE ENCOUNTER
Her US shows a (L) ovarian cyst measuring approx 3 cm. There is nothing we can really do about this. Is she able to take Ibuprofen? If so, recommend 600mg TID x the next 2-3 days. Her IUD is in the correct location.

## 2022-03-24 NOTE — ED PROVIDER NOTES
Subjective   History of Present Illness  History of Present Illness    Chief complaint: Abdominal pain    Location: Left lower quadrant    Quality/Severity: 7/10 is worst, 7/10 current, pressure    Timing/Onset/Duration: Started 2 days ago    Modifying Factors: Nothing seems to make it better    Associated Symptoms: Mild headache.  No fever chills or cough.  No sore throat earache or nasal congestion.  No chest pain or shortness of breath.  No burning on urination.  The patient has chronic diarrhea related to having her gallbladder removed, no worse than usual.  No vaginal bleeding or discharges.    Narrative: This 31-year-old white female with a history of ovarian cysts, had an outpatient ultrasound done today that showed a left ovarian cyst.  The patient presents complaining of pain.  She states that she tried the contact her OB/GYN 4 times with no return call.    PCP: Rosetta Adams    OB/GYN: Clementina Arthur      Review of Systems   Constitutional: Negative for chills and fever.   HENT: Negative for congestion and sore throat.    Respiratory: Negative for shortness of breath.    Cardiovascular: Negative for chest pain.   Gastrointestinal: Positive for abdominal pain and diarrhea (Chronic, no change). Negative for nausea and vomiting.   Genitourinary: Negative for dysuria.   Musculoskeletal: Negative for back pain.   Skin: Negative for rash.   Neurological: Negative for headaches.   Psychiatric/Behavioral: Negative for confusion.        Medication List      ASK your doctor about these medications    albuterol sulfate  (90 Base) MCG/ACT inhaler  Commonly known as: PROVENTIL HFA;VENTOLIN HFA;PROAIR HFA     betamethasone (augmented) 0.05 % cream  Commonly known as: DIPROLENE     clotrimazole-betamethasone 1-0.05 % cream  Commonly known as: LOTRISONE  Apply 1 application topically to the appropriate area as directed 2 (Two) Times a Day for 14 days.     ibuprofen 800 MG tablet  Commonly known as:  ADVIL,MOTRIN  Take 1 tablet by mouth Every 8 (Eight) Hours As Needed for Moderate Pain  (pain).     Kyleena 19.5 MG intrauterine device IUD  Generic drug: levonorgestrel     metoprolol succinate  MG 24 hr tablet  Commonly known as: TOPROL-XL            Past Medical History:   Diagnosis Date   • Anemia    • Asthma    • Female infertility    • Gestational diabetes    • Headache    • Hypertension    • PCOS (polycystic ovarian syndrome) 2013   • Polycystic ovary syndrome        Allergies   Allergen Reactions   • Zithromax [Azithromycin] Rash       Past Surgical History:   Procedure Laterality Date   • ADENOIDECTOMY     •  SECTION N/A 2020    Procedure:  SECTION PRIMARY;  Surgeon: Prachi Kim DO;  Location: [facility] LABOR DELIVERY;  Service: Obstetrics/Gynecology;  Laterality: N/A;   • CHOLECYSTECTOMY     • DIAGNOSTIC LAPAROSCOPY N/A 2020    Procedure: DIAGNOSTIC LAPAROSCOPY;  Surgeon: Imelda Rayo MD;  Location: [facility] OR;  Service: Obstetrics/Gynecology;  Laterality: N/A;   • TONSILLECTOMY  2008    adnoids also   • US GUIDED CYST ASPIRATION BREAST N/A 2020       Family History   Problem Relation Age of Onset   • Hypertension Mother    • Cancer Mother    • Hypertension Maternal Grandmother    • Diabetes Maternal Grandfather    • Hypertension Paternal Grandmother    • Diabetes Paternal Grandfather    • Deep vein thrombosis Maternal Aunt    • Breast cancer Neg Hx    • Colon cancer Neg Hx    • Pulmonary embolism Neg Hx    • Anesthesia problems Neg Hx        Social History     Socioeconomic History   • Marital status:    Tobacco Use   • Smoking status: Current Every Day Smoker     Packs/day: 0.25     Types: Cigarettes     Last attempt to quit: 2020     Years since quittin.7   • Smokeless tobacco: Never Used   Vaping Use   • Vaping Use: Never used   Substance and Sexual Activity   • Alcohol use: Not Currently   • Drug use: No   • Sexual activity: Yes      Partners: Male     Birth control/protection: None           Objective   Physical Exam  Vitals reviewed: The temperature is 98.5 °F, pulse 95, respirations 16, /112, room air pulse ox 97%.   Constitutional:       Appearance: She is well-developed.   HENT:      Head: Atraumatic.      Mouth/Throat:      Mouth: Mucous membranes are moist.      Pharynx: Oropharynx is clear.   Cardiovascular:      Rate and Rhythm: Normal rate and regular rhythm.      Heart sounds: Normal heart sounds. No murmur heard.    No friction rub. No gallop.   Pulmonary:      Effort: Pulmonary effort is normal.      Breath sounds: Normal breath sounds.   Abdominal:      General: Abdomen is flat. Bowel sounds are normal.      Palpations: Abdomen is soft.      Tenderness: There is abdominal tenderness (Moderate left lower quadrant tenderness).      Hernia: No hernia is present.   Skin:     General: Skin is warm and dry.      Capillary Refill: Capillary refill takes less than 2 seconds.   Neurological:      General: No focal deficit present.      Mental Status: She is alert and oriented to person, place, and time.         Procedures           ED Course  ED Course as of 03/24/22 1949 Th Mar 24, 2022   1944 The laboratory values were reviewed by me.  The urine microscopic shows 3-5 RBCs, trace bacteria, sodium 134.  Laboratory values are otherwise unremarkable. [RC]      ED Course User Index  [RC] Chidi Spangler MD      18:37 EDT, 03/24/22:  The pelvic ultrasound performed today indicated the patient had a 3.37 cm left ovarian cyst that was septated.       19:49 EDT, 03/24/22:  The patient was reassessed.  She rates her pain as 3/10.  Her vital signs reviewed and are stable.  Abdominal exam: Soft, very mild left lower quadrant tenderness, no rebound, no guarding and no masses, positive bowel sounds    19:53 EDT, 03/24/22:  I spoke with Clementina Garibay, on-call for OB/GYN.  Recommended treating the patient's pain with ibuprofen.  The  patient should call the office if her pain worsens, or she should return to the emergency department.  She recommended that the patient get a repeat pelvic ultrasound in a month to reevaluate the cyst.  Will need to be scheduled through Dr. Marcos's office.  Patient should return to the emergency department if there is worsening pain, fever, vomiting, worse in any way at all.  The patient's questions were answered she will be discharged in good condition.  The patient's diagnosis of ovarian cyst was discussed with her.  All of her questions were answered she will be discharged in good condition.                                            MDM    Final diagnoses:   Left ovarian cyst       ED Disposition  ED Disposition     None          No follow-up provider specified.       Medication List      No changes were made to your prescriptions during this visit.          Chidi Spangler MD  03/24/22 [id number]

## 2022-03-28 ENCOUNTER — TELEPHONE (OUTPATIENT)
Dept: OBSTETRICS AND GYNECOLOGY | Facility: CLINIC | Age: 32
End: 2022-03-28

## 2022-03-28 NOTE — TELEPHONE ENCOUNTER
She probably needs to be seen by a physician. I am worried the cyst is not the cause of her pain. This could be GI related since she is having diarrhea and vomiting. She might need a CT scan.

## 2022-03-28 NOTE — TELEPHONE ENCOUNTER
PATIENT AWARE AND STATES SHE HAS BEEN TAKING 800MG AND THE PAIN IS NO LONGER A MAJOR ISSUE, SHE IS HAVING DECREASED APPETITE, NAUSEA, SHE WILL CONTACT HER PCP IN REGARDS TO THESE SYMPTOMS

## 2022-04-01 NOTE — PROGRESS NOTES
OB CHECK  2022    CHIEF COMPLAINT:    Chief Complaint   Patient presents with   • Prenatal Care   • Pain     period like cramping        Holly Sutton is a 31 year old  female at 34w2d with TRISTEN:  2022 by 9 wk US.  She presents today for routine OB Check.  Her pregnancy has been complicated by previous  and BMI >30.     Has had some period-like cramping intermittently.      + fetal movement. No leakage of fluid, vaginal bleeding, or abnormal discharge.     Objective:  Vitals:    22 1622   BP: 132/80   Pulse: 97   Resp: 18   Temp: 97.8 °F (36.6 °C)       GENERAL:  Alert and oriented.  No acute distress.  HEART:  RRR, no murmur.  ABDOMEN:  Soft, gravid, nontender, nondistended.  Fundal height: 36 cm  FHR:  135 bpm  SKIN:  Dry and intact.  No lesions or rashes noted.   EXTREMITIES:  No edema noted.   PSYCHIATRIC:  Mood stable.  Normal affect.    SVE: 2/50/-3 posterior.     PRENATAL LABS:  Blood Type:  O Rh Positive  Antibody:  negative  GC/CHL:  Negative/Negative 2020  H/H/Plts:  13.3/40.0/372  HepB Surface Antigen:  Negative   HIV:  Non-reactive   Rubella:  Immune   RPR: Non-reactive   Pap Smear:  Normal  First Trimester/Quad Screen:  negative  One-hour GTT:  133  GBS:  N/A        Assessment/Plan:  Holly Sutton is a 31 year old  female at 34w2d with TRISTEN:  2022 by 9 wk US who is here for routine OB visit.  She is doing well.  Vital signs stable.    Prenatal Care:  - Prenatal labs:  Within normal limits, see above.  - Continue prenatal vitamin.  - Genetic Screen:  Normal.  - Flu vaccination done   - Tdap done   - Cervical exam performed due to symptoms of cramping. Discussed signs/symptoms of pre-term labor.     Medical Problem #1:  Obesity, BMI >30  - Monitor weight gain throughout pregnancy   - Healthy lifestyle, encourage physical activity       Medical Problem #2: Left hip/pelvis pain  - Most likely musculoskeletal and now most likely sciatica   - Heat, stretching, tylenol  Subjective   Es Branch is a 30 y.o. female.  Referred by Mary Dorantes for left sided pulmonary embolism    Reason for follow-up  Follow-up on thrombophilia work-up  Pulmonary embolism    History of Present Illness   Ms. Branch is a 30-year-old  lady with history of polycystic ovarian syndrome, gestational diabetes who delivered her first child about 3 weeks ago presents for further evaluation of pulmonary embolism.  She had a fairly complicated pregnancy with gestational diabetes and hypertension and underwent a  after failed induction.  She presented to the emergency room on 2020 with complaints of chest pain and pressure for the chest CTA was performed.  CT was suggestive of poor opacification of the pulmonary arteries of the segmental and subsegmental levels with regular opacification within the segmental and subsegmental branches of the left lower pulmonary arterial vasculature.  There are wedge-shaped opacities in the left lower lobe of the lung.  This was concerning for pulmonary embolism.  She was started on Eliquis and discharged home.  She came back to the emergency room subsequently with complaints of chest pain for which she was given hydrocodone to help with the pain.  She was evaluated by her OB at her postpartum visit who referred her to hematology for further management.  Patient denies any previous history of pulmonary embolism or DVT.  She had 1 miscarriage in 2020 at 7 weeks.  She reports a family history of pulmonary embolism with her maternal grandmother having  of a PE.  Maternal aunt also has DVT.  Patient's mother has been diagnosed with leukemia and reportedly on Hydrea.  Patient denies any recent travel.  She smokes about half pack per day.  She had quit with pregnancy however she has resumed smoking in the postpartum period.      Interval History  Ms. Branch presents today for a video visit.  She has almost quit smoking and only smokes about 2    - Has bought maternity belt     Medical Problem #3: GERD  - lifestyle measures  - Tums, Famotidine       Medical Problem #4: Previous history of  section with failed vacuum delivery   -Most likely secondary to CPD.   - U/S on 3/10 showed EFW 92% and AC 94th percentile.   - Consult on 3/18 with Dr. Pretty. Patient has opted for repeat  section which is scheduled at 39 weeks on 2022.     - Discussed consult recommendations which included obtaining a 3 hour GTT to due borderline result of 133 and AC 94 th percentile. Patient does not have previous history of gestational diabetes. She declines.       Follow Up:  Pt to follow up in 2 wks.        Faby Luna MD      cigarettes a day and she feels like she will be able to stop this completely in the next few days.  She reports that the chest pain has improved as well.  She is experiencing some abdominal pain, nausea and vomiting and she is scheduled to see general surgery next week to decide on cholecystectomy.  She has questions on management of anticoagulation around the time of surgery.  She denies any excessive bleeding or bruising or epistaxis.  No blood in stool.    The following portions of the patient's history were reviewed and updated as appropriate: allergies, current medications, past family history, past medical history, past social history, past surgical history and problem list.    Past Medical History:   Diagnosis Date   • Anemia    • Asthma    • Female infertility    • Gestational diabetes    • Headache    • Hypertension    • PCOS (polycystic ovarian syndrome) 2013   • Polycystic ovary syndrome         Past Surgical History:   Procedure Laterality Date   • ADENOIDECTOMY     •  SECTION N/A 2020    Procedure:  SECTION PRIMARY;  Surgeon: Prachi Kim DO;  Location: [facility] LABOR DELIVERY;  Service: Obstetrics/Gynecology;  Laterality: N/A;   • DIAGNOSTIC LAPAROSCOPY N/A 2020    Procedure: DIAGNOSTIC LAPAROSCOPY;  Surgeon: Imelda Rayo MD;  Location: [facility] OR;  Service: Obstetrics/Gynecology;  Laterality: N/A;   • TONSILLECTOMY  2008    adnoids also   • US GUIDED CYST ASPIRATION BREAST N/A 2020        Family History   Problem Relation Age of Onset   • Hypertension Mother    • Cancer Mother    • Hypertension Maternal Grandmother    • Diabetes Maternal Grandfather    • Hypertension Paternal Grandmother    • Diabetes Paternal Grandfather    • Deep vein thrombosis Maternal Aunt    • Breast cancer Neg Hx    • Colon cancer Neg Hx    • Pulmonary embolism Neg Hx    • Anesthesia problems Neg Hx         Social History     Socioeconomic History   • Marital status:       Spouse name: Not on file   • Number of children: Not on file   • Years of education: Not on file   • Highest education level: Not on file   Occupational History   • Occupation: cna     Employer: [employer]   Social Needs   • Financial resource strain: Not hard at all   • Food insecurity     Worry: Never true     Inability: Never true   • Transportation needs     Medical: No     Non-medical: No   Tobacco Use   • Smoking status: Current Every Day Smoker     Packs/day: 0.25     Types: Cigarettes     Last attempt to quit: 2020     Years since quittin.6   • Smokeless tobacco: Never Used   Substance and Sexual Activity   • Alcohol use: Not Currently   • Drug use: No   • Sexual activity: Yes     Partners: Male     Birth control/protection: None   Lifestyle   • Physical activity     Days per week: 4 days     Minutes per session: 60 min   • Stress: Not at all        OB History        2    Para   1    Term   1            AB   1    Living   1       SAB   1    TAB        Ectopic        Molar        Multiple   0    Live Births   1                 No Known Allergies         Review of Systems   Constitutional: Negative.    HENT: Negative.    Eyes: Negative.    Respiratory: Negative for chest tightness.    Cardiovascular: Negative for chest pain.   Gastrointestinal: Negative.    Endocrine: Negative.    Genitourinary: Negative.    Musculoskeletal: Negative.    Allergic/Immunologic: Negative.    Neurological: Negative.    Hematological: Negative.    Psychiatric/Behavioral: Negative.          Objective   not currently breastfeeding.   Physical Exam  Vitals signs reviewed.   Constitutional:       Appearance: Normal appearance.   HENT:      Head: Normocephalic and atraumatic.      Right Ear: External ear normal.      Left Ear: External ear normal.      Nose: Nose normal. No congestion or rhinorrhea.      Mouth/Throat:      Mouth: Mucous membranes are moist.      Pharynx: Oropharynx is clear.   Eyes:       General: No scleral icterus.        Right eye: No discharge.         Left eye: No discharge.      Conjunctiva/sclera: Conjunctivae normal.      Pupils: Pupils are equal, round, and reactive to light.   Neck:      Musculoskeletal: Normal range of motion and neck supple.   Cardiovascular:      Rate and Rhythm: Normal rate and regular rhythm.      Pulses: Normal pulses.      Heart sounds: Normal heart sounds.   Pulmonary:      Effort: Pulmonary effort is normal. No respiratory distress.      Breath sounds: Normal breath sounds. No wheezing or rhonchi.   Chest:      Chest wall: No tenderness.   Abdominal:      General: Abdomen is flat. Bowel sounds are normal.   Musculoskeletal: Normal range of motion.         General: No swelling, tenderness, deformity or signs of injury.      Right lower leg: No edema.      Left lower leg: No edema.   Skin:     General: Skin is warm.      Coloration: Skin is not jaundiced or pale.      Findings: No bruising or erythema.   Neurological:      General: No focal deficit present.      Mental Status: She is alert and oriented to person, place, and time. Mental status is at baseline.   Psychiatric:         Mood and Affect: Mood normal.         Behavior: Behavior normal.         Thought Content: Thought content normal.         Judgment: Judgment normal.           Clinical Support on 02/04/2021   Component Date Value Ref Range Status   • WBC 02/04/2021 7.92  3.40 - 10.80 10*3/mm3 Final   • RBC 02/04/2021 4.98  3.77 - 5.28 10*6/mm3 Final   • Hemoglobin 02/04/2021 13.0  12.0 - 15.9 g/dL Final   • Hematocrit 02/04/2021 41.2  34.0 - 46.6 % Final   • MCV 02/04/2021 82.7  79.0 - 97.0 fL Final   • MCH 02/04/2021 26.1* 26.6 - 33.0 pg Final   • MCHC 02/04/2021 31.6  31.5 - 35.7 g/dL Final   • RDW 02/04/2021 16.0* 12.3 - 15.4 % Final   • RDW-SD 02/04/2021 48.2  37.0 - 54.0 fl Final   • MPV 02/04/2021 9.6  6.0 - 12.0 fL Final   • Platelets 02/04/2021 389  140 - 450 10*3/mm3 Final   • Neutrophil %  02/04/2021 48.2  42.7 - 76.0 % Final   • Lymphocyte % 02/04/2021 41.5  19.6 - 45.3 % Final   • Monocyte % 02/04/2021 6.7  5.0 - 12.0 % Final   • Eosinophil % 02/04/2021 2.7  0.3 - 6.2 % Final   • Basophil % 02/04/2021 0.8  0.0 - 1.5 % Final   • Immature Grans % 02/04/2021 0.1  0.0 - 0.5 % Final   • Neutrophils, Absolute 02/04/2021 3.82  1.70 - 7.00 10*3/mm3 Final   • Lymphocytes, Absolute 02/04/2021 3.29* 0.70 - 3.10 10*3/mm3 Final   • Monocytes, Absolute 02/04/2021 0.53  0.10 - 0.90 10*3/mm3 Final   • Eosinophils, Absolute 02/04/2021 0.21  0.00 - 0.40 10*3/mm3 Final   • Basophils, Absolute 02/04/2021 0.06  0.00 - 0.20 10*3/mm3 Final   • Immature Grans, Absolute 02/04/2021 0.01  0.00 - 0.05 10*3/mm3 Final   • nRBC 02/04/2021 0.0  0.0 - 0.2 /100 WBC Final        No radiology results for the last 30 days.     Labs 2/4/2021 reviewed  DRV VT elevated at 49.5 however this is thought to be secondary to the factor X inhibitors.    Lupus anticoagulant negative  Beta-2 glycoprotein IgG IgA and IgM negative  Anticardiolipin antibody IgG and IgM negative  Protein S antigen normal at 74 next protein S functional normal at 18  Protein C activity normal at 154%  Antithrombin III activity normal at 109%  Prothrombin gene mutation negative  Factor V Leiden mutation negative    Assessment/Plan       30-year-old premenopausal  lady who is currently 3 weeks postpartum presents with recent diagnosis of left-sided pulmonary embolism    *Subsegmental and segmental pulmonary embolism  · She has been started on Eliquis for the treatment of pulmonary embolism  · Continue the same  · Eliquis started 12/31/2020  · Pulmonary embolus is most likely due to the recent pregnancy and hence provoked  · She denies any previous history of blood clots or DVT  · She also smokes which increase the risk of DVT and PE  · Thrombophilia work-up negative  · Continue anticoagulation for 6 months    *Planned cholecystectomy  · Patient is symptomatic  and hence plans to undergo cholecystectomy  · Discussed anticoagulation management around the time of surgery  · If surgery is performed before April then recommend holding Eliquis 3 days prior to surgery and starting Lovenox therapeutic dose which can be held the night before surgery  · However if the surgery is planned after April then it would be okay to hold Eliquis and bridging Lovenox not necessary  · Discussed this at length with the patient today.    *History of prior miscarriage  · Miscarriage in first trimester  · APS testing negative    *Tobacco use  · Extensive counseling performed today for smoking cessation.  · Discussed increased risk of DVT PE, malignancies, coronary artery disease associated with smoking.  · Patient is going to quit smoking.    *Anemia  · Hemoglobin improved and normal at 13   · most likely secondary to iron deficiency from recent pregnancy    *Follow-up-4 months    You have chosen to receive care through a telehealth visit.  Do you consent to use a video/audio connection for your medical care today? Yes

## 2022-04-14 ENCOUNTER — HOSPITAL ENCOUNTER (EMERGENCY)
Facility: HOSPITAL | Age: 32
Discharge: HOME | End: 2022-04-14
Attending: EMERGENCY MEDICINE | Admitting: EMERGENCY MEDICINE

## 2022-04-14 ENCOUNTER — APPOINTMENT (OUTPATIENT)
Dept: ULTRASOUND IMAGING | Facility: HOSPITAL | Age: 32
End: 2022-04-14

## 2022-04-14 VITALS
OXYGEN SATURATION: 97 % | SYSTOLIC BLOOD PRESSURE: 131 MMHG | WEIGHT: 214 LBS | BODY MASS INDEX: 31.7 KG/M2 | HEIGHT: 69 IN | DIASTOLIC BLOOD PRESSURE: 89 MMHG | RESPIRATION RATE: 17 BRPM | HEART RATE: 71 BPM | TEMPERATURE: 99.1 F

## 2022-04-14 DIAGNOSIS — M79.661 RIGHT CALF PAIN: Primary | ICD-10-CM

## 2022-04-14 PROCEDURE — 99282 EMERGENCY DEPT VISIT SF MDM: CPT

## 2022-04-14 PROCEDURE — 93971 EXTREMITY STUDY: CPT

## 2022-04-14 PROCEDURE — 99282 EMERGENCY DEPT VISIT SF MDM: CPT | Performed by: PHYSICIAN ASSISTANT

## 2022-04-14 NOTE — DISCHARGE INSTRUCTIONS
Ibuprofen and Tylenol to help with your pain.  Please follow-up with your primary care as needed.  Return here with any worsening symptoms.

## 2022-04-14 NOTE — ED PROVIDER NOTES
EMERGENCY DEPARTMENT ENCOUNTER      Room Number:     History is provided by the patient, no translation services needed    HPI:    Chief complaint: right calf pain     Narrative: Pt is a 32 y.o. female who presents complaining of right calf pain that is been ongoing for 1 day.  Patient does report having a history of a blood clot and is worried about a potential DVT therefore she presents for evaluation.  She does report that she has had a rash on her inner thigh however this has solved itself today.  She denies any shortness of breath or chest pain.  No difficulties with ambulation.  No other complaints at this time.      PMD: Rosetta Adams MD    REVIEW OF SYSTEMS  Review of Systems  Complete review of systems performed otherwise negative except pertinent positives per HPI.    PAST MEDICAL HISTORY  Active Ambulatory Problems     Diagnosis Date Noted   • Pregnancy 2020   • CHTN- labetalol 50 mg QD, baseline 24 hour urine- 80 mg 2020   • Rubella non-immune status, antepartum- MMR pp 2020   • PCOS (polycystic ovarian syndrome) 2020   • Gestational diabetes mellitus, class A2: glyburide 2020   • Solitary cyst of right breast: 5.7cm. Scheduled for drainage 2020   • Gastroesophageal reflux disease with esophagitis 2020   • Sacroiliac pain during pregnancy 10/16/2020   • S/P  section 2020   • Acute pulmonary embolism (HCC) 2021   • Encounter for IUD insertion: Paragard 3/17/21 2021   • Postpartum depression 2021   • IUD check up 2021   • Encounter for IUD removal 2021   • Malpositioned intrauterine device 2021   • Encounter for counseling regarding contraception 2021   • Cyst of left ovary 2021   • Cysts of both ovaries 2021     Resolved Ambulatory Problems     Diagnosis Date Noted   • Ectopic pregnancy 2020   • S/P laparoscopy 2020   • Pelvic pain 2020   • Blood type, Rh positive  2020   • Elevated glycosylated hemoglobin- HgBA1c= 5.7, needs early 2 hour GTT at 20  & 28 weeks 2020   • Gestational diabetes 10/10/2020   • Normal labor and delivery 12/15/2020     Past Medical History:   Diagnosis Date   • Anemia    • Asthma    • Female infertility    • Headache    • Hypertension    • Polycystic ovary syndrome        PAST SURGICAL HISTORY  Past Surgical History:   Procedure Laterality Date   • ADENOIDECTOMY     •  SECTION N/A 2020    Procedure:  SECTION PRIMARY;  Surgeon: Prachi Kim DO;  Location: [facility] LABOR DELIVERY;  Service: Obstetrics/Gynecology;  Laterality: N/A;   • CHOLECYSTECTOMY     • DIAGNOSTIC LAPAROSCOPY N/A 2020    Procedure: DIAGNOSTIC LAPAROSCOPY;  Surgeon: Imelda Rayo MD;  Location: [facility] OR;  Service: Obstetrics/Gynecology;  Laterality: N/A;   • TONSILLECTOMY  2008    adnoids also   • US GUIDED CYST ASPIRATION BREAST N/A 2020       FAMILY HISTORY  Family History   Problem Relation Age of Onset   • Hypertension Mother    • Cancer Mother    • Hypertension Maternal Grandmother    • Diabetes Maternal Grandfather    • Hypertension Paternal Grandmother    • Diabetes Paternal Grandfather    • Deep vein thrombosis Maternal Aunt    • Breast cancer Neg Hx    • Colon cancer Neg Hx    • Pulmonary embolism Neg Hx    • Anesthesia problems Neg Hx        SOCIAL HISTORY  Social History     Socioeconomic History   • Marital status:    Tobacco Use   • Smoking status: Current Every Day Smoker     Packs/day: 0.25     Types: Cigarettes     Last attempt to quit: 2020     Years since quittin.8   • Smokeless tobacco: Never Used   Vaping Use   • Vaping Use: Never used   Substance and Sexual Activity   • Alcohol use: Not Currently   • Drug use: No   • Sexual activity: Yes     Partners: Male     Birth control/protection: None       ALLERGIES  Zithromax [azithromycin]    No current facility-administered medications for this  encounter.    Current Outpatient Medications:   •  betamethasone, augmented, (DIPROLENE) 0.05 % cream, Apply 1 application topically to the appropriate area as directed 2 (Two) Times a Day., Disp: , Rfl:   •  ketorolac (TORADOL) 10 MG tablet, Take 1 tablet by mouth Every 6 (Six) Hours As Needed for Moderate Pain ., Disp: 20 tablet, Rfl: 0  •  metoprolol succinate XL (TOPROL-XL) 100 MG 24 hr tablet, Take 1 tablet by mouth Daily., Disp: , Rfl:   •  albuterol sulfate  (90 Base) MCG/ACT inhaler, , Disp: , Rfl:   •  levonorgestrel (Kyleena) 19.5 MG intrauterine device IUD, 1 each by Intrauterine route 1 (One) Time., Disp: , Rfl:     PHYSICAL EXAM  ED Triage Vitals [04/14/22 1238]   Temp Heart Rate Resp BP SpO2   99.1 °F (37.3 °C) 81 18 (!) 164/103 97 %      Temp src Heart Rate Source Patient Position BP Location FiO2 (%)   Oral Monitor Sitting Right arm --       Physical Exam  Vitals and nursing note reviewed.   Constitutional:       Appearance: Normal appearance. She is normal weight. She is not ill-appearing or toxic-appearing.   HENT:      Head: Normocephalic and atraumatic.      Right Ear: Tympanic membrane normal.      Left Ear: Tympanic membrane normal.      Nose: Nose normal.      Mouth/Throat:      Mouth: Mucous membranes are moist.   Eyes:      Extraocular Movements: Extraocular movements intact.      Conjunctiva/sclera: Conjunctivae normal.      Pupils: Pupils are equal, round, and reactive to light.   Cardiovascular:      Rate and Rhythm: Normal rate and regular rhythm.      Pulses: Normal pulses.   Pulmonary:      Effort: Pulmonary effort is normal.      Breath sounds: No stridor. No wheezing or rhonchi.   Chest:      Chest wall: No tenderness.   Abdominal:      General: Abdomen is flat.      Palpations: Abdomen is soft.      Tenderness: There is no abdominal tenderness.   Musculoskeletal:         General: Tenderness (With calf palpation on the right side) present. Normal range of motion.      Cervical  back: Normal range of motion. No tenderness.   Skin:     General: Skin is warm.      Capillary Refill: Capillary refill takes less than 2 seconds.      Coloration: Skin is not pale.   Neurological:      General: No focal deficit present.      Mental Status: She is alert and oriented to person, place, and time.   Psychiatric:         Mood and Affect: Mood normal.           LAB RESULTS  Lab Results (last 24 hours)     ** No results found for the last 24 hours. **            I ordered the above labs and reviewed the results    RADIOLOGY  US Venous Doppler Lower Extremity Right (duplex)    Result Date: 4/14/2022  VENOUS DOPPLER ULTRASOUND, RIGHT LOWER EXTREMITY, 04/14/2022  HISTORY: Right calf pain for one day  COMPARISON:   TECHNIQUE: Venous Doppler ultrasound examination of the right leg was performed using grey-scale, spectral Doppler, and color flow Doppler ultrasound imaging.  FINDINGS: The examination is negative.  There is no evidence of deep venous thrombosis from the groin to the lower calf. The greater saphenous vein is also patent.      Negative examination.  No evidence of right lower extremity DVT.  This report was finalized on 4/14/2022 1:28 PM by Dr. Juan Carlin MD.        I ordered the above radiologic testing and reviewed the results    PROCEDURES  Procedures      PROGRESS AND CONSULTS           MEDICAL DECISION MAKING    MDM     32-year-old female seen and evaluated for right calf pain that began today.  Patient does have a history of PE and therefore wanted to be thoroughly evaluated.  Rival her vitals are 164/103, she is afebrile her heart rate is 81 she is 97% on room air.  Patient is resting comfortably in bed with no acute distress present.  On examination of her legs she does not have any edema of the right leg however she does have some tenderness with palpation of the right calf and due to her history ultrasound will be performed of the right leg to ensure there is no DVT present.  She does not  have any other systemic symptoms such as shortness of breath chest pain or fevers.  Therefore I do not believe further work-up will be necessary.    Ultrasound of the right lower extremity is negative for DVT.    Patient does not have any rashes present on the leg concerning for cellulitis.  She does not have any other complaints that need to be worked up today therefore I think is appropriate for her to be discharged home and follow-up with her primary care doctor.  She is agreeable with this plan and patient is discharged in stable condition.    DIAGNOSIS  Final diagnoses:   Right calf pain       Latest Documented Vital Signs:  As of 13:53 EDT  BP- 131/89 HR- 71 Temp- 99.1 °F (37.3 °C) (Oral) O2 sat- 97%    DISPOSITION    Discussed pertinent findings with the patient/family.  Patient/Family voiced understanding of need to follow-up for recheck and further testing as needed.  Return to the Emergency Department warnings were given.         Medication List      No changes were made to your prescriptions during this visit.              Follow-up Information     Call  Rosetta Adams MD.    Specialty: Family Medicine  Why: To schedule follow up appointment  Contact information:  [address] [facility] [address] [phone]                           Dictated utilizing Dragon dictation     Lias Lakhani PA-C  04/14/22 2495

## 2022-12-15 ENCOUNTER — APPOINTMENT (OUTPATIENT)
Dept: CT IMAGING | Facility: HOSPITAL | Age: 32
End: 2022-12-15

## 2022-12-15 ENCOUNTER — HOSPITAL ENCOUNTER (EMERGENCY)
Facility: HOSPITAL | Age: 32
Discharge: HOME | End: 2022-12-15
Attending: EMERGENCY MEDICINE | Admitting: EMERGENCY MEDICINE

## 2022-12-15 VITALS
HEART RATE: 99 BPM | OXYGEN SATURATION: 97 % | DIASTOLIC BLOOD PRESSURE: 97 MMHG | HEIGHT: 69 IN | BODY MASS INDEX: 30.21 KG/M2 | SYSTOLIC BLOOD PRESSURE: 146 MMHG | RESPIRATION RATE: 16 BRPM | WEIGHT: 204 LBS | TEMPERATURE: 98.4 F

## 2022-12-15 DIAGNOSIS — I88.9 CERVICAL ADENITIS: Primary | ICD-10-CM

## 2022-12-15 DIAGNOSIS — J03.90 LINGULAR TONSILLITIS: ICD-10-CM

## 2022-12-15 LAB
ALBUMIN SERPL-MCNC: 4.1 G/DL (ref 3.5–5.2)
ALBUMIN/GLOB SERPL: 1.4 G/DL
ALP SERPL-CCNC: 74 U/L (ref 39–117)
ALT SERPL W P-5'-P-CCNC: 28 U/L (ref 1–33)
ANION GAP SERPL CALCULATED.3IONS-SCNC: 11.9 MMOL/L (ref 5–15)
AST SERPL-CCNC: 27 U/L (ref 1–32)
B-HCG UR QL: NEGATIVE
BASOPHILS # BLD AUTO: 0.03 10*3/MM3 (ref 0–0.2)
BASOPHILS NFR BLD AUTO: 0.2 % (ref 0–1.5)
BILIRUB SERPL-MCNC: 0.4 MG/DL (ref 0–1.2)
BUN SERPL-MCNC: 6 MG/DL (ref 6–20)
BUN/CREAT SERPL: 7.6 (ref 7–25)
CALCIUM SPEC-SCNC: 9.1 MG/DL (ref 8.6–10.5)
CHLORIDE SERPL-SCNC: 101 MMOL/L (ref 98–107)
CO2 SERPL-SCNC: 26.1 MMOL/L (ref 22–29)
CREAT SERPL-MCNC: 0.79 MG/DL (ref 0.57–1)
D-LACTATE SERPL-SCNC: 0.9 MMOL/L (ref 0.5–2)
DEPRECATED RDW RBC AUTO: 44.6 FL (ref 37–54)
EGFRCR SERPLBLD CKD-EPI 2021: 102.1 ML/MIN/1.73
EOSINOPHIL # BLD AUTO: 0.01 10*3/MM3 (ref 0–0.4)
EOSINOPHIL NFR BLD AUTO: 0.1 % (ref 0.3–6.2)
ERYTHROCYTE [DISTWIDTH] IN BLOOD BY AUTOMATED COUNT: 13.7 % (ref 12.3–15.4)
FLUAV RNA RESP QL NAA+PROBE: NOT DETECTED
FLUBV RNA RESP QL NAA+PROBE: NOT DETECTED
GLOBULIN UR ELPH-MCNC: 2.9 GM/DL
GLUCOSE SERPL-MCNC: 112 MG/DL (ref 65–99)
HCT VFR BLD AUTO: 40.5 % (ref 34–46.6)
HETEROPH AB SER QL LA: NEGATIVE
HGB BLD-MCNC: 13.1 G/DL (ref 12–15.9)
IMM GRANULOCYTES # BLD AUTO: 0.04 10*3/MM3 (ref 0–0.05)
IMM GRANULOCYTES NFR BLD AUTO: 0.2 % (ref 0–0.5)
LYMPHOCYTES # BLD AUTO: 2.51 10*3/MM3 (ref 0.7–3.1)
LYMPHOCYTES NFR BLD AUTO: 13.8 % (ref 19.6–45.3)
MCH RBC QN AUTO: 28.5 PG (ref 26.6–33)
MCHC RBC AUTO-ENTMCNC: 32.3 G/DL (ref 31.5–35.7)
MCV RBC AUTO: 88 FL (ref 79–97)
MONOCYTES # BLD AUTO: 1.59 10*3/MM3 (ref 0.1–0.9)
MONOCYTES NFR BLD AUTO: 8.8 % (ref 5–12)
NEUTROPHILS NFR BLD AUTO: 13.97 10*3/MM3 (ref 1.7–7)
NEUTROPHILS NFR BLD AUTO: 76.9 % (ref 42.7–76)
PLATELET # BLD AUTO: 305 10*3/MM3 (ref 140–450)
PMV BLD AUTO: 9.4 FL (ref 6–12)
POTASSIUM SERPL-SCNC: 4 MMOL/L (ref 3.5–5.2)
PROT SERPL-MCNC: 7 G/DL (ref 6–8.5)
RBC # BLD AUTO: 4.6 10*6/MM3 (ref 3.77–5.28)
S PYO AG THROAT QL: NEGATIVE
SARS-COV-2 RNA RESP QL NAA+PROBE: NOT DETECTED
SODIUM SERPL-SCNC: 139 MMOL/L (ref 136–145)
WBC NRBC COR # BLD: 18.15 10*3/MM3 (ref 3.4–10.8)

## 2022-12-15 PROCEDURE — 87880 STREP A ASSAY W/OPTIC: CPT | Performed by: EMERGENCY MEDICINE

## 2022-12-15 PROCEDURE — 85025 COMPLETE CBC W/AUTO DIFF WBC: CPT | Performed by: EMERGENCY MEDICINE

## 2022-12-15 PROCEDURE — 86308 HETEROPHILE ANTIBODY SCREEN: CPT | Performed by: EMERGENCY MEDICINE

## 2022-12-15 PROCEDURE — 87147 CULTURE TYPE IMMUNOLOGIC: CPT | Performed by: EMERGENCY MEDICINE

## 2022-12-15 PROCEDURE — 70491 CT SOFT TISSUE NECK W/DYE: CPT

## 2022-12-15 PROCEDURE — 96375 TX/PRO/DX INJ NEW DRUG ADDON: CPT

## 2022-12-15 PROCEDURE — 80053 COMPREHEN METABOLIC PANEL: CPT | Performed by: EMERGENCY MEDICINE

## 2022-12-15 PROCEDURE — 0 IOPAMIDOL PER 1 ML: Performed by: EMERGENCY MEDICINE

## 2022-12-15 PROCEDURE — 81025 URINE PREGNANCY TEST: CPT | Performed by: EMERGENCY MEDICINE

## 2022-12-15 PROCEDURE — 25010000002 DEXAMETHASONE PER 1 MG: Performed by: EMERGENCY MEDICINE

## 2022-12-15 PROCEDURE — 87081 CULTURE SCREEN ONLY: CPT | Performed by: EMERGENCY MEDICINE

## 2022-12-15 PROCEDURE — 36415 COLL VENOUS BLD VENIPUNCTURE: CPT

## 2022-12-15 PROCEDURE — 83605 ASSAY OF LACTIC ACID: CPT | Performed by: EMERGENCY MEDICINE

## 2022-12-15 PROCEDURE — 87040 BLOOD CULTURE FOR BACTERIA: CPT | Performed by: EMERGENCY MEDICINE

## 2022-12-15 PROCEDURE — 25010000002 PIPERACILLIN SOD-TAZOBACTAM PER 1 G: Performed by: EMERGENCY MEDICINE

## 2022-12-15 PROCEDURE — 99283 EMERGENCY DEPT VISIT LOW MDM: CPT

## 2022-12-15 PROCEDURE — 87636 SARSCOV2 & INF A&B AMP PRB: CPT | Performed by: EMERGENCY MEDICINE

## 2022-12-15 PROCEDURE — 96365 THER/PROPH/DIAG IV INF INIT: CPT

## 2022-12-15 RX ORDER — DEXAMETHASONE SODIUM PHOSPHATE 10 MG/ML
10 INJECTION INTRAMUSCULAR; INTRAVENOUS ONCE
Status: COMPLETED | OUTPATIENT
Start: 2022-12-15 | End: 2022-12-15

## 2022-12-15 RX ORDER — AMOXICILLIN AND CLAVULANATE POTASSIUM 875; 125 MG/1; MG/1
1 TABLET, FILM COATED ORAL 2 TIMES DAILY
Qty: 20 TABLET | Refills: 0 | Status: SHIPPED | OUTPATIENT
Start: 2022-12-15 | End: 2022-12-25

## 2022-12-15 RX ORDER — METHYLPREDNISOLONE 4 MG/1
TABLET ORAL
Qty: 21 TABLET | Refills: 0 | Status: SHIPPED | OUTPATIENT
Start: 2022-12-15

## 2022-12-15 RX ADMIN — PIPERACILLIN AND TAZOBACTAM 3.38 G: 3; .375 INJECTION, POWDER, FOR SOLUTION INTRAVENOUS at 12:59

## 2022-12-15 RX ADMIN — SODIUM CHLORIDE 1000 ML: 9 INJECTION, SOLUTION INTRAVENOUS at 10:16

## 2022-12-15 RX ADMIN — IOPAMIDOL 100 ML: 755 INJECTION, SOLUTION INTRAVENOUS at 11:54

## 2022-12-15 RX ADMIN — DEXAMETHASONE SODIUM PHOSPHATE 10 MG: 10 INJECTION INTRAMUSCULAR; INTRAVENOUS at 12:58

## 2022-12-15 NOTE — ED PROVIDER NOTES
Subjective   History of Present Illness  History of Present Illness    Chief complaint: Sore throat and swelling    Location: Throat    Quality/Severity: Moderate    Timing/Onset/Duration: Started last night    Modifying Factors: Nothing makes it better    Associated Symptoms: No headache.  Patient had a T-max of 102.5.  Patient's had a sore throat.  No earache or nasal congestion.  Patient has had chills.  The patient's had a cough productive of brownish sputum.  No chest pain or shortness of breath.  Patient's had nausea but no vomiting.  Patient's had nonbloody diarrhea.  No burning on urination    Narrative: This 32-year-old white female presents with throat swelling the patient is a smoker.  She was seen at urgent care on Sunday and given Zofran and told she had food poisoning or stomach virus.  The patient had a COVID test and a flu test that were negative.  Patient states she was feeling better until yesterday.    PCP: Rosetta Adams MD  Review of Systems   Constitutional: Positive for chills and fever.   HENT: Positive for sore throat. Negative for congestion and ear pain.    Respiratory: Positive for cough. Negative for shortness of breath.    Cardiovascular: Negative for chest pain.   Gastrointestinal: Positive for diarrhea. Negative for abdominal pain, nausea and vomiting.   Genitourinary: Negative for dysuria.   Musculoskeletal: Negative for back pain.   Skin: Negative for rash.   Neurological: Negative for headaches.   Psychiatric/Behavioral: Negative for confusion.        Medication List      ASK your doctor about these medications    albuterol sulfate  (90 Base) MCG/ACT inhaler  Commonly known as: PROVENTIL HFA;VENTOLIN HFA;PROAIR HFA     betamethasone (augmented) 0.05 % cream  Commonly known as: DIPROLENE     ketorolac 10 MG tablet  Commonly known as: TORADOL  Take 1 tablet by mouth Every 6 (Six) Hours As Needed for Moderate Pain .     Kyleena 19.5 MG intrauterine device IUD  Generic drug:  levonorgestrel     metoprolol succinate  MG 24 hr tablet  Commonly known as: TOPROL-XL            Past Medical History:   Diagnosis Date   • Anemia    • Asthma    • Female infertility    • Gestational diabetes    • Headache    • Hypertension    • PCOS (polycystic ovarian syndrome) 2013   • Polycystic ovary syndrome        Allergies   Allergen Reactions   • Zithromax [Azithromycin] Rash       Past Surgical History:   Procedure Laterality Date   • ADENOIDECTOMY     •  SECTION N/A 2020    Procedure:  SECTION PRIMARY;  Surgeon: Prachi Kim DO;  Location: [facility] LABOR DELIVERY;  Service: Obstetrics/Gynecology;  Laterality: N/A;   • CHOLECYSTECTOMY     • DIAGNOSTIC LAPAROSCOPY N/A 2020    Procedure: DIAGNOSTIC LAPAROSCOPY;  Surgeon: Imelda Rayo MD;  Location: [facility] OR;  Service: Obstetrics/Gynecology;  Laterality: N/A;   • TONSILLECTOMY  2008    adnoids also   • US GUIDED CYST ASPIRATION BREAST N/A 2020       Family History   Problem Relation Age of Onset   • Hypertension Mother    • Cancer Mother    • Hypertension Maternal Grandmother    • Diabetes Maternal Grandfather    • Hypertension Paternal Grandmother    • Diabetes Paternal Grandfather    • Deep vein thrombosis Maternal Aunt    • Breast cancer Neg Hx    • Colon cancer Neg Hx    • Pulmonary embolism Neg Hx    • Anesthesia problems Neg Hx        Social History     Socioeconomic History   • Marital status:    Tobacco Use   • Smoking status: Every Day     Packs/day: 0.25     Types: Cigarettes     Last attempt to quit: 2020     Years since quittin.5   • Smokeless tobacco: Never   Vaping Use   • Vaping Use: Never used   Substance and Sexual Activity   • Alcohol use: Not Currently   • Drug use: No   • Sexual activity: Yes     Partners: Male     Birth control/protection: None           Objective   Physical Exam  Vitals (The temperature is 98.4 °F, pulse 107, respirations 16, /99, room air  pulse ox 98%) and nursing note reviewed.   HENT:      Head: Normocephalic and atraumatic.      Right Ear: Tympanic membrane normal.      Left Ear: Tympanic membrane normal.      Mouth/Throat:      Mouth: Mucous membranes are moist.      Pharynx: Uvula midline. Oropharyngeal exudate and posterior oropharyngeal erythema present. No pharyngeal swelling or uvula swelling.      Tonsils: No tonsillar exudate or tonsillar abscesses.   Neck:      Comments: Left sided neck mass  Cardiovascular:      Rate and Rhythm: Normal rate and regular rhythm.      Heart sounds: No murmur heard.    No friction rub. No gallop.   Pulmonary:      Effort: Pulmonary effort is normal.      Breath sounds: Normal breath sounds.   Abdominal:      General: Bowel sounds are normal. There is no distension.      Palpations: Abdomen is soft. There is no mass.      Tenderness: There is no abdominal tenderness.   Musculoskeletal:      Cervical back: Normal range of motion and neck supple.   Lymphadenopathy:      Cervical: Cervical adenopathy present.   Skin:     General: Skin is warm.   Neurological:      General: No focal deficit present.      Mental Status: She is alert and oriented to person, place, and time.         Procedures           ED Course  ED Course as of 12/15/22 1350   Thu Dec 15, 2022   1108 The rapid strep screen is negative. [RC]   1108 The glucose is 112.  The CMP is otherwise unremarkable [RC]   1108 The Monospot is negative. [RC]   1108 The white blood cell count is 18.1.  The neutrophil percent 76%.  The absolute neutrophil count is 13.9. [RC]   1140 The COVID flu is negative [RC]   1140 The urine hCG is negative [RC]   1254 The lactic acid is normal. [RC]      ED Course User Index  [RC] Chidi Spangler MD      12:50 EST, 12/15/22:  The patient's diagnosis neck mass was discussed with her.  The patient will be given Decadron IV and Zosyn.  She will have a lactic acid drawn and blood cultures.  ENT for the [facility]  has been paged out.  The patient has been placed on a monitor and pulse ox.    12:55 EST, 12/15/22:  I spoke with Dr. Arthur, on-call for ENT at [facility], he is going to view the CAT scans and call back.     13:51 EST, 12/15/22:  Dr. Arthur is reviewed the CT scans.  He indicates that the patient does not have a condition requiring surgery at this time.  Indicated the patient could go home with close follow-up with primary care provider.    13:56 EST, 12/15/22:  The patient was reassessed.  Her vital signs were reviewed and are stable.  She has no stridor.  She is tolerating liquids.    13:56 EST, 12/15/22:  The patient's diagnosis of adenitis of the neck and lingular tonsillitis was discussed with her.  The patient will be placed on a Medrol Dosepak.  The patient will be given a prescription for Augmentin.  Patient should follow-up with her primary care provider on Friday.  She should return to emergency department if there is increasing shortness of breath, difficulty swallowing or speaking, worsening way at all.  All the patient's question were answered she will be discharged in good condition.                                      MDM    Final diagnoses:   Cervical adenitis   Lingular tonsillitis       ED Disposition  ED Disposition     None          No follow-up provider specified.       Medication List      No changes were made to your prescriptions during this visit.          Chidi Spangler MD  12/15/22 2944

## 2022-12-15 NOTE — DISCHARGE INSTRUCTIONS
Follow-up with Yasmin Adams for recheck tomorrow.  Return the emergency department if there is difficulty swallowing or speaking, increased shortness of breath, worse in any way at all.

## 2022-12-15 NOTE — ED NOTES
[facility] transfer center spoke with Bebe, awaiting a return call from ENT on today Dr. Arhtur

## 2022-12-16 LAB — BACTERIA SPEC AEROBE CULT: ABNORMAL

## 2022-12-17 LAB
BACTERIA SPEC AEROBE CULT: ABNORMAL
GRAM STN SPEC: ABNORMAL
ISOLATED FROM: ABNORMAL

## 2022-12-20 LAB — BACTERIA SPEC AEROBE CULT: NORMAL

## 2024-01-10 NOTE — PLAN OF CARE
Problem: Adult Inpatient Plan of Care  Goal: Plan of Care Review  Outcome: Ongoing, Progressing  Flowsheets (Taken 12/16/2020 1659)  Progress: improving  Plan of Care Reviewed With:   spouse   patient  Outcome Summary: vss, urine output adequate, bonding well with infant, pain controlled  Goal: Patient-Specific Goal (Individualized)  Outcome: Ongoing, Progressing  Goal: Absence of Hospital-Acquired Illness or Injury  Outcome: Ongoing, Progressing  Intervention: Identify and Manage Fall Risk  Recent Flowsheet Documentation  Taken 12/16/2020 1639 by Haley Pepe RN  Safety Promotion/Fall Prevention: safety round/check completed  Taken 12/16/2020 1526 by Haley Pepe RN  Safety Promotion/Fall Prevention: safety round/check completed  Taken 12/16/2020 0717 by Haley Pepe RN  Safety Promotion/Fall Prevention: safety round/check completed  Intervention: Prevent and Manage VTE (venous thromboembolism) Risk  Recent Flowsheet Documentation  Taken 12/16/2020 1639 by Haley Pepe RN  VTE Prevention/Management:   bilateral   sequential compression devices on  Taken 12/16/2020 1526 by Haley Pepe RN  VTE Prevention/Management:   bilateral   sequential compression devices on  Taken 12/16/2020 1233 by Haley Pepe RN  VTE Prevention/Management:   bilateral   sequential compression devices on  Taken 12/16/2020 1216 by Haley Pepe RN  VTE Prevention/Management:   bilateral   sequential compression devices on  Taken 12/16/2020 1125 by Haley Pepe RN  VTE Prevention/Management:   bilateral   sequential compression devices on  Goal: Optimal Comfort and Wellbeing  Outcome: Ongoing, Progressing  Intervention: Provide Person-Centered Care  Recent Flowsheet Documentation  Taken 12/16/2020 1526 by Haley Pepe RN  Trust Relationship/Rapport:   care explained   choices provided   thoughts/feelings acknowledged   reassurance provided   questions answered   questions encouraged  Taken 12/16/2020 0717 by  Haley Pepe, RN  Trust Relationship/Rapport:   care explained   choices provided   questions encouraged   reassurance provided   thoughts/feelings acknowledged   questions answered  Goal: Readiness for Transition of Care  Outcome: Ongoing, Progressing     Problem: Bleeding (Labor)  Goal: Hemostasis  Outcome: Ongoing, Progressing     Problem: Change in Fetal Wellbeing (Labor)  Goal: Stable Fetal Wellbeing  Outcome: Ongoing, Progressing     Problem: Delayed Labor Progression (Labor)  Goal: Effective Progression to Delivery  Outcome: Ongoing, Progressing     Problem: Infection (Labor)  Goal: Absence of Infection Signs and Symptoms  Outcome: Ongoing, Progressing     Problem: Labor Pain (Labor)  Goal: Acceptable Pain Control  Outcome: Ongoing, Progressing     Problem: Uterine Tachysystole (Labor)  Goal: Normal Uterine Contraction Pattern  Outcome: Ongoing, Progressing   Goal Outcome Evaluation:  Plan of Care Reviewed With: spouse, patient  Progress: improving  Outcome Summary: vss, urine output adequate, bonding well with infant, pain controlled   41 y/o F with 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     CT :  Since 10/24/2023, again demonstrated are destructive endplate changes at C5-C6 with sclerosis and kyphotic deformity. Again demonstrated is anterior subluxation of the bilateral C5-C6 facet joints which is unchanged in appearance compared to prior exam. No significant change in mild prevertebral/retropharyngeal edema. These findings may represent discitis osteomyelitis versus renal spondyloarthropathy given patient's history.    Tolerating HD with the following parameters:   Hemodialysis Treatment.:     Schedule: Once, Modality: Hemodialysis, Access: Arteriovenous Fistula    Dialyzer: Optiflux [id number], Time: 180 Min    Blood Flow: 400 mL/Min , Dialysate Flow: 500 mL/Min, Dialysate Temp: 36.5, Tubinmm (Adult)    Target Fluid Removal: 2.5 Liters    Dialysate Electrolytes (mEq/L): Potassium 2, Calcium 2.5, Sodium 138, Bicarbonate 35 (01-10-24 @ 08:10) [Completed]      Plan:  HD today, will cont. MWF schedule for now.   No am labs today.   Daily BMP       Access:  LUE AVF functional     HTN:  BP elevated as pt above EDW   UF with HD as as above   Hold BP meds on HD days to achieve optimal UF, can resume post HD if BP >140/80 for better control     Anemia:  Hgb at goal       MBD:  Calcium: 9.8  Phos: 7.6  Continue phos binders  Renal diet.    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     CT :  Since 10/24/2023, again demonstrated are destructive endplate changes at C5-C6 with sclerosis and kyphotic deformity. Again demonstrated is anterior subluxation of the bilateral C5-C6 facet joints which is unchanged in appearance compared to prior exam. No significant change in mild prevertebral/retropharyngeal edema. These findings may represent discitis osteomyelitis versus renal spondyloarthropathy given patient's history.    Tolerating HD with the following parameters:   Hemodialysis Treatment.:     Schedule: Once, Modality: Hemodialysis, Access: Arteriovenous Fistula    Dialyzer: Optiflux [id number], Time: 180 Min    Blood Flow: 400 mL/Min , Dialysate Flow: 500 mL/Min, Dialysate Temp: 36.5, Tubinmm (Adult)    Target Fluid Removal: 2.5 Liters    Dialysate Electrolytes (mEq/L): Potassium 2, Calcium 2.5, Sodium 138, Bicarbonate 35 (01-10-24 @ 08:10) [Completed]      Plan:  HD today, will cont. MWF schedule for now.   No am labs today.   Daily BMP       Access:  LUE AVF functional     HTN:  BP elevated as pt above EDW   UF with HD as as above   Hold BP meds on HD days to achieve optimal UF, can resume post HD if BP >140/80 for better control     Anemia:  Hgb at goal       MBD:  Calcium: 9.8  Phos: 7.6  Continue phos binders  Renal diet.

## 2025-06-02 ENCOUNTER — APPOINTMENT (OUTPATIENT)
Dept: ULTRASOUND IMAGING | Facility: HOSPITAL | Age: 35
End: 2025-06-02
Payer: COMMERCIAL

## 2025-06-02 ENCOUNTER — HOSPITAL ENCOUNTER (EMERGENCY)
Facility: HOSPITAL | Age: 35
Discharge: HOME | End: 2025-06-02
Admitting: EMERGENCY MEDICINE
Payer: COMMERCIAL

## 2025-06-02 VITALS
BODY MASS INDEX: 28.14 KG/M2 | DIASTOLIC BLOOD PRESSURE: 100 MMHG | TEMPERATURE: 98.3 F | WEIGHT: 190 LBS | OXYGEN SATURATION: 98 % | HEIGHT: 69 IN | RESPIRATION RATE: 18 BRPM | HEART RATE: 69 BPM | SYSTOLIC BLOOD PRESSURE: 160 MMHG

## 2025-06-02 DIAGNOSIS — R60.0 LOWER LEG EDEMA: Primary | ICD-10-CM

## 2025-06-02 DIAGNOSIS — I10 ELEVATED BLOOD PRESSURE READING IN OFFICE WITH DIAGNOSIS OF HYPERTENSION: ICD-10-CM

## 2025-06-02 LAB
ALBUMIN SERPL-MCNC: 4.2 G/DL (ref 3.5–5.2)
ALBUMIN/GLOB SERPL: 1.7 G/DL
ALP SERPL-CCNC: 77 U/L (ref 39–117)
ALT SERPL W P-5'-P-CCNC: 28 U/L (ref 1–33)
ANION GAP SERPL CALCULATED.3IONS-SCNC: 10.7 MMOL/L (ref 5–15)
AST SERPL-CCNC: 24 U/L (ref 1–32)
BASOPHILS # BLD AUTO: 0.05 10*3/MM3 (ref 0–0.2)
BASOPHILS NFR BLD AUTO: 0.5 % (ref 0–1.5)
BILIRUB SERPL-MCNC: <0.2 MG/DL (ref 0–1.2)
BUN SERPL-MCNC: 8.5 MG/DL (ref 6–20)
BUN/CREAT SERPL: 11.3 (ref 7–25)
CALCIUM SPEC-SCNC: 9 MG/DL (ref 8.6–10.5)
CHLORIDE SERPL-SCNC: 101 MMOL/L (ref 98–107)
CO2 SERPL-SCNC: 26.3 MMOL/L (ref 22–29)
CREAT SERPL-MCNC: 0.75 MG/DL (ref 0.57–1)
DEPRECATED RDW RBC AUTO: 42.8 FL (ref 37–54)
EGFRCR SERPLBLD CKD-EPI 2021: 106.6 ML/MIN/1.73
EOSINOPHIL # BLD AUTO: 0.17 10*3/MM3 (ref 0–0.4)
EOSINOPHIL NFR BLD AUTO: 1.7 % (ref 0.3–6.2)
ERYTHROCYTE [DISTWIDTH] IN BLOOD BY AUTOMATED COUNT: 13.6 % (ref 12.3–15.4)
GLOBULIN UR ELPH-MCNC: 2.5 GM/DL
GLUCOSE SERPL-MCNC: 124 MG/DL (ref 65–99)
HCT VFR BLD AUTO: 36.6 % (ref 34–46.6)
HGB BLD-MCNC: 12.3 G/DL (ref 12–15.9)
IMM GRANULOCYTES # BLD AUTO: 0.04 10*3/MM3 (ref 0–0.05)
IMM GRANULOCYTES NFR BLD AUTO: 0.4 % (ref 0–0.5)
LYMPHOCYTES # BLD AUTO: 3.03 10*3/MM3 (ref 0.7–3.1)
LYMPHOCYTES NFR BLD AUTO: 30.1 % (ref 19.6–45.3)
MCH RBC QN AUTO: 29 PG (ref 26.6–33)
MCHC RBC AUTO-ENTMCNC: 33.6 G/DL (ref 31.5–35.7)
MCV RBC AUTO: 86.3 FL (ref 79–97)
MONOCYTES # BLD AUTO: 1.07 10*3/MM3 (ref 0.1–0.9)
MONOCYTES NFR BLD AUTO: 10.6 % (ref 5–12)
NEUTROPHILS NFR BLD AUTO: 5.72 10*3/MM3 (ref 1.7–7)
NEUTROPHILS NFR BLD AUTO: 56.7 % (ref 42.7–76)
NRBC BLD AUTO-RTO: 0 /100 WBC (ref 0–0.2)
NT-PROBNP SERPL-MCNC: 84.8 PG/ML (ref 0–450)
PLATELET # BLD AUTO: 319 10*3/MM3 (ref 140–450)
PMV BLD AUTO: 9.5 FL (ref 6–12)
POTASSIUM SERPL-SCNC: 3.5 MMOL/L (ref 3.5–5.2)
PROT SERPL-MCNC: 6.7 G/DL (ref 6–8.5)
RBC # BLD AUTO: 4.24 10*6/MM3 (ref 3.77–5.28)
SODIUM SERPL-SCNC: 138 MMOL/L (ref 136–145)
WBC NRBC COR # BLD AUTO: 10.08 10*3/MM3 (ref 3.4–10.8)

## 2025-06-02 PROCEDURE — 85025 COMPLETE CBC W/AUTO DIFF WBC: CPT

## 2025-06-02 PROCEDURE — 83880 ASSAY OF NATRIURETIC PEPTIDE: CPT

## 2025-06-02 PROCEDURE — 99284 EMERGENCY DEPT VISIT MOD MDM: CPT

## 2025-06-02 PROCEDURE — 80053 COMPREHEN METABOLIC PANEL: CPT

## 2025-06-02 PROCEDURE — 93971 EXTREMITY STUDY: CPT

## 2025-06-02 RX ORDER — METOPROLOL TARTRATE 25 MG/1
25 TABLET, FILM COATED ORAL ONCE
Status: COMPLETED | OUTPATIENT
Start: 2025-06-02 | End: 2025-06-02

## 2025-06-02 RX ORDER — SODIUM CHLORIDE 0.9 % (FLUSH) 0.9 %
10 SYRINGE (ML) INJECTION AS NEEDED
Status: DISCONTINUED | OUTPATIENT
Start: 2025-06-02 | End: 2025-06-02

## 2025-06-02 RX ORDER — HYDROCHLOROTHIAZIDE 12.5 MG/1
12.5 TABLET ORAL DAILY
Qty: 30 TABLET | Refills: 0 | Status: SHIPPED | OUTPATIENT
Start: 2025-06-02 | End: 2025-07-02

## 2025-06-02 RX ADMIN — METOPROLOL TARTRATE 25 MG: 25 TABLET, FILM COATED ORAL at 17:44

## 2025-06-02 NOTE — ED PROVIDER NOTES
CHIEF CONCERN   Chief Complaint   Patient presents with    Foot Swelling       Subjective     History of Present Illness  The patient is a 35-year-old female who presents for evaluation of leg swelling.    She began experiencing edema in her feet on Friday, which has since progressed to her legs. The pain, initially localized in her foot, has ascended to her hip. She reports a sensation of pressure when in a supine position but does not experience any shortness of breath. She also reports mild cough symptoms, which she attributes to allergies. She is concerned about the possibility of a blood clot and notes that her blood pressure was elevated, although she plans to consult her primary care physician regarding this. She is currently using a Kyleena intrauterine device.  She recalls similar swelling during her pregnancy, at which time she was prescribed antihypertensive medication. However, she discontinued the medication postpartum due to adverse effects. Her latest blood work tests  were conducted during her pregnancy . Denies any recent travel, being on prolonged trips. States that her swelling was initially on both legs and it resolves with elevating her legs but the left leg pain is new and it is unilateral.     She has a history of pulmonary embolism in .    SOCIAL HISTORY  She vapes.    MEDICATIONS  Current: [facility]          Review of Systems    Past Medical History:   Diagnosis Date    Anemia     Asthma     Female infertility     Gestational diabetes     Headache     Hypertension     PCOS (polycystic ovarian syndrome) 2013    Polycystic ovary syndrome        Past Surgical History:   Procedure Laterality Date    ADENOIDECTOMY       SECTION N/A 2020    Procedure:  SECTION PRIMARY;  Surgeon: Prachi Kim DO;  Location: [facility] LABOR DELIVERY;  Service: Obstetrics/Gynecology;  Laterality: N/A;    CHOLECYSTECTOMY      DIAGNOSTIC LAPAROSCOPY N/A 2020    Procedure:  DIAGNOSTIC LAPAROSCOPY;  Surgeon: Imelda Rayo MD;  Location: [facility];  Service: Obstetrics/Gynecology;  Laterality: N/A;    TONSILLECTOMY  2008    adnoids also    US GUIDED CYST ASPIRATION BREAST N/A 2020       Family History   Problem Relation Age of Onset    Hypertension Mother     Cancer Mother     Hypertension Maternal Grandmother     Diabetes Maternal Grandfather     Hypertension Paternal Grandmother     Diabetes Paternal Grandfather     Deep vein thrombosis Maternal Aunt     Breast cancer Neg Hx     Colon cancer Neg Hx     Pulmonary embolism Neg Hx     Anesthesia problems Neg Hx        Social History     Socioeconomic History    Marital status:    Tobacco Use    Smoking status: Every Day     Current packs/day: 0.00     Types: Cigarettes     Last attempt to quit: 2020     Years since quittin.9    Smokeless tobacco: Never   Vaping Use    Vaping status: Never Used   Substance and Sexual Activity    Alcohol use: Not Currently    Drug use: No    Sexual activity: Yes     Partners: Male     Birth control/protection: None       Allergies   Allergen Reactions    Zithromax [Azithromycin] Rash       No current facility-administered medications on file prior to encounter.     Current Outpatient Medications on File Prior to Encounter   Medication Sig Dispense Refill    albuterol sulfate  (90 Base) MCG/ACT inhaler       betamethasone, augmented, (DIPROLENE) 0.05 % cream Apply 1 Application topically to the appropriate area as directed 2 (Two) Times a Day.      levonorgestrel (Kyleena) 19.5 MG intrauterine device IUD 1 each by Intrauterine route 1 (One) Time.      metoprolol succinate XL (TOPROL-XL) 100 MG 24 hr tablet Take 1 tablet by mouth Daily.      [DISCONTINUED] ketorolac (TORADOL) 10 MG tablet Take 1 tablet by mouth Every 6 (Six) Hours As Needed for Moderate Pain . 20 tablet 0    [DISCONTINUED] methylPREDNISolone (Medrol) 4 MG dose pack follow package directions 21 tablet  "0       /100 (BP Location: Left arm, Patient Position: Lying)   Pulse 69   Temp 98.3 °F (36.8 °C) (Oral)   Resp 18   Ht 175.3 cm (69\")   Wt 86.2 kg (190 lb)   LMP  (LMP Unknown)   SpO2 98%   BMI 28.06 kg/m²     Objective     Physical Exam  Vitals and nursing note reviewed.   Constitutional:       General: She is not in acute distress.     Appearance: Normal appearance. She is not ill-appearing, toxic-appearing or diaphoretic.   HENT:      Head: Normocephalic and atraumatic.   Cardiovascular:      Rate and Rhythm: Normal rate.   Pulmonary:      Effort: Pulmonary effort is normal. No respiratory distress.      Breath sounds: Normal breath sounds.   Abdominal:      General: Abdomen is flat. Bowel sounds are normal.      Palpations: Abdomen is soft.   Musculoskeletal:         General: Tenderness (left lower calf) present. Normal range of motion.      Cervical back: Neck supple.      Right lower le+ Edema present.      Left lower le+ Edema present.      Right foot: Normal pulse.      Left foot: Swelling present. Normal pulse.   Skin:     General: Skin is warm.      Capillary Refill: Capillary refill takes less than 2 seconds.      Findings: No bruising, erythema or rash.   Neurological:      Mental Status: She is alert.   Psychiatric:         Mood and Affect: Mood normal.         Behavior: Behavior normal.         Procedures         ED Course       Lab Results (last 24 hours)       Procedure Component Value Units Date/Time    CBC & Differential [[id number]]  (Abnormal) Collected: 25    Specimen: Blood Updated: 25    Narrative:      The following orders were created for panel order CBC & Differential.  Procedure                               Abnormality         Status                     ---------                               -----------         ------                     CBC Auto Differential[[id number]]        Abnormal            Final result                 Please view results " for these tests on the individual orders.    Comprehensive Metabolic Panel [[id number]]  (Abnormal) Collected: 06/02/25 1743    Specimen: Blood Updated: 06/02/25 1812     Glucose 124 mg/dL      BUN 8.5 mg/dL      Creatinine 0.75 mg/dL      Sodium 138 mmol/L      Potassium 3.5 mmol/L      Chloride 101 mmol/L      CO2 26.3 mmol/L      Calcium 9.0 mg/dL      Total Protein 6.7 g/dL      Albumin 4.2 g/dL      ALT (SGPT) 28 U/L      AST (SGOT) 24 U/L      Alkaline Phosphatase 77 U/L      Total Bilirubin <0.2 mg/dL      Globulin 2.5 gm/dL      A/G Ratio 1.7 g/dL      BUN/Creatinine Ratio 11.3     Anion Gap 10.7 mmol/L      eGFR 106.6 mL/min/1.73     Narrative:      GFR Categories in Chronic Kidney Disease (CKD)              GFR Category          GFR (mL/min/1.73)    Interpretation  G1                    90 or greater        Normal or high (1)  G2                    60-89                Mild decrease (1)  G3a                   45-59                Mild to moderate decrease  G3b                   30-44                Moderate to severe decrease  G4                    15-29                Severe decrease  G5                    14 or less           Kidney failure    (1)In the absence of evidence of kidney disease, neither GFR category G1 or G2 fulfill the criteria for CKD.    eGFR calculation 2021 CKD-EPI creatinine equation, which does not include race as a factor    BNP [[id number]]  (Normal) Collected: 06/02/25 1743    Specimen: Blood Updated: 06/02/25 1812     proBNP 84.8 pg/mL     Narrative:      This assay is used as an aid in the diagnosis of individuals suspected of having heart failure. It can be used as an aid in the diagnosis of acute decompensated heart failure (ADHF) in patients presenting with signs and symptoms of ADHF to the emergency department (ED). In addition, NT-proBNP of <300 pg/mL indicates ADHF is not likely.    Age Range Result Interpretation  NT-proBNP Concentration (pg/mL:      <50             Positive             >450                   Gray                 300-450                    Negative             <300    50-75           Positive            >900                  Gray                300-900                  Negative            <300      >75             Positive            >1800                  Gray                300-1800                  Negative            <300    CBC Auto Differential [[id number]]  (Abnormal) Collected: 06/02/25 1743    Specimen: Blood Updated: 06/02/25 1753     WBC 10.08 10*3/mm3      RBC 4.24 10*6/mm3      Hemoglobin 12.3 g/dL      Hematocrit 36.6 %      MCV 86.3 fL      MCH 29.0 pg      MCHC 33.6 g/dL      RDW 13.6 %      RDW-SD 42.8 fl      MPV 9.5 fL      Platelets 319 10*3/mm3      Neutrophil % 56.7 %      Lymphocyte % 30.1 %      Monocyte % 10.6 %      Eosinophil % 1.7 %      Basophil % 0.5 %      Immature Grans % 0.4 %      Neutrophils, Absolute 5.72 10*3/mm3      Lymphocytes, Absolute 3.03 10*3/mm3      Monocytes, Absolute 1.07 10*3/mm3      Eosinophils, Absolute 0.17 10*3/mm3      Basophils, Absolute 0.05 10*3/mm3      Immature Grans, Absolute 0.04 10*3/mm3      nRBC 0.0 /100 WBC             US Venous Doppler Lower Extremity Left (duplex)  Result Date: 6/2/2025  US VENOUS DOPPLER LOWER EXTREMITY LEFT (DUPLEX) Date of Exam: 6/2/2025 6:07 PM EDT Indication: left calf pain / swelling / hx of PE. Comparison: None available. Technique:  Routine gray scale, color flow and spectral Doppler analysis of the left lower extremity. A complete venous study was performed with image documentation obtained per protocol. Findings: The left common femoral, greater saphenous, profunda femoral, superficial femoral, popliteal, and proximal trifurcation veins are widely patent, with no evidence of thrombus.     Impression: No evidence of thrombus within left lower extremity venous system. Electronically Signed: Levy Mustafa MD  6/2/2025 7:06 PM EDT  Workstation ID: [id number]          Results      Medical Decision Making  Patient is a 35-year-old female with past medical history of hypertension, PE, presents for left lower extremity pain and lower extremity swelling.  Patient is nontoxic-appearing without any apparent distress, patient does have increased in edema on her left lower compared to the her right leg.  Mild calf tenderness.  Distal pulses are normal.  Patient calf is without any erythema however considering her past medical history of PE and her contraceptive implants and unilateral swelling,  will do an ultrasound and Will check basic labs to rule out any patient's bilateral lower extremity edema on her phone is unclear of etiology at this time, concerning for DVT versus lymphedema versus fluid retention atributed to any nephrotic complication.    Patient's blood pressure elevated on arrival,  will administer  a dose of metoprolol 25mg po. She is asymptomatic.     1845 --discussed preliminary results with patient.  No evidence of DVT.  Will call if there is any changes to the ultrasound official read.  Encouraged patient to wear compression stocking, elevate legs, considering patient's elevated blood pressure, and improvement metoprolol, will start patient on hydrochlorothiazide 12.5 mg dose to take daily to help with her fluid retention and blood pressure.  Diet changes considering patient's glucose 124 and the diet changes while on medication. Encouraged to keep log of blood pressure readings and take to her next following appointment.  Patient is agreeable to plan    Problems Addressed:  Elevated blood pressure reading in office with diagnosis of hypertension: complicated acute illness or injury  Lower leg edema: complicated acute illness or injury    Amount and/or Complexity of Data Reviewed  Labs: ordered.    Risk  Prescription drug management.        Diagnoses and all orders for this visit:    1. Lower leg edema (Primary)    2. Elevated blood pressure reading in office with  diagnosis of hypertension    Other orders  -     CBC & Differential; Standing  -     Comprehensive Metabolic Panel; Standing  -     BNP; Standing  -     US Venous Doppler Lower Extremity Left (duplex); Standing  -     Cancel: Insert Peripheral IV; Standing  -     Discontinue: sodium chloride 0.9 % flush 10 mL  -     metoprolol tartrate (LOPRESSOR) tablet 25 mg  -     CBC & Differential  -     Comprehensive Metabolic Panel  -     BNP  -     US Venous Doppler Lower Extremity Left (duplex)  -     Cancel: Insert Peripheral IV  -     hydroCHLOROthiazide 12.5 MG tablet; Take 1 tablet by mouth Daily for 30 days.  Dispense: 30 tablet; Refill: 0        Final diagnoses:   Lower leg edema   Elevated blood pressure reading in office with diagnosis of hypertension        Follow-up Information       Martha Funez DO. Schedule an appointment as soon as possible for a visit in 3 days.    Specialties: Hospitalist, Internal Medicine  Why: As needed, re-evaluation, If symptoms worsen  Contact information:  [address] [place] [address]  [phone]                             New Medications Ordered This Visit   Medications    metoprolol tartrate (LOPRESSOR) tablet 25 mg    hydroCHLOROthiazide 12.5 MG tablet     Sig: Take 1 tablet by mouth Daily for 30 days.     Dispense:  30 tablet     Refill:  0         Patient or patient representative verbalized consent for the use of Ambient Listening during the visit for chart documentation.  NORMAN Lorenzo 6/2/2025 22:54 EDT    FOR FULL DISCHARGE INSTRUCTIONS/COMMENTS/HANDOUTS please see the AVS      Landen Santoyo APRN  06/02/25 1381

## 2025-06-02 NOTE — DISCHARGE INSTRUCTIONS
Your ultrasound is negative for any DVT.    You may wear compression stocking to help with the swelling in your lower extremity   keep a log of your blood pressure reading until you see your primary care doctor   you may take hydrochlorothiazide daily, however you need to make sure that you have your kidney function rechecked every 6 months.  Please monitor your diet.

## (undated) DEVICE — APPL CHLORAPREP W/TINT 26ML ORNG

## (undated) DEVICE — GLV SURG NEOLON 2G PF LF 6.5 STRL

## (undated) DEVICE — PREP SOL POVIDONE/IODINE BT 4OZ

## (undated) DEVICE — ENDOPATH PNEUMONEEDLE INSUFFLATION NEEDLES WITH LUER LOCK CONNECTORS 120MM: Brand: ENDOPATH

## (undated) DEVICE — LAG GYN LAPAROSCOPY: Brand: MEDLINE INDUSTRIES, INC.

## (undated) DEVICE — SUT VIC 0 CTX 36IN J978H

## (undated) DEVICE — ENDOPATH XCEL BLADELESS TROCARS WITH STABILITY SLEEVES: Brand: ENDOPATH XCEL

## (undated) DEVICE — SUT MNCRYL 4/0 PS2 18 IN

## (undated) DEVICE — SUCTION CANISTER, 1000CC,SAFELINER: Brand: DEROYAL

## (undated) DEVICE — SOL IRR H2O BTL 1000ML STRL

## (undated) DEVICE — ANTIBACTERIAL UNDYED BRAIDED (POLYGLACTIN 910), SYNTHETIC ABSORBABLE SUTURE: Brand: COATED VICRYL

## (undated) DEVICE — HYDROGEL COATED LATEX URINE METER FOLEY TRAY,16 FR/CH (5.3 MM), 5 ML CATHETER PRE-CONNECTED TO 2000 ML DRAINAGE BAG WITH NEEDLE SAMPLING: Brand: DOVER

## (undated) DEVICE — TRY SKINPREP DRYPREP

## (undated) DEVICE — PK C/SECT 40

## (undated) DEVICE — ENDOPATH XCEL UNIVERSAL TROCAR STABLILITY SLEEVES: Brand: ENDOPATH XCEL

## (undated) DEVICE — TBG INSUFL W FLTR STRL

## (undated) DEVICE — SUT VIC PLSTC UD PS2 4/0 27IN J426